# Patient Record
Sex: MALE | Race: ASIAN | NOT HISPANIC OR LATINO | Employment: FULL TIME | URBAN - METROPOLITAN AREA
[De-identification: names, ages, dates, MRNs, and addresses within clinical notes are randomized per-mention and may not be internally consistent; named-entity substitution may affect disease eponyms.]

---

## 2017-05-10 ENCOUNTER — HOSPITAL ENCOUNTER (OUTPATIENT)
Dept: RADIOLOGY | Facility: MEDICAL CENTER | Age: 42
End: 2017-05-10
Attending: FAMILY MEDICINE
Payer: COMMERCIAL

## 2017-05-10 ENCOUNTER — OFFICE VISIT (OUTPATIENT)
Dept: MEDICAL GROUP | Facility: MEDICAL CENTER | Age: 42
End: 2017-05-10
Payer: COMMERCIAL

## 2017-05-10 VITALS
WEIGHT: 143 LBS | SYSTOLIC BLOOD PRESSURE: 110 MMHG | TEMPERATURE: 97.4 F | HEIGHT: 66 IN | BODY MASS INDEX: 22.98 KG/M2 | DIASTOLIC BLOOD PRESSURE: 72 MMHG | HEART RATE: 99 BPM | OXYGEN SATURATION: 96 %

## 2017-05-10 DIAGNOSIS — S69.91XA INJURY OF FINGER OF RIGHT HAND, INITIAL ENCOUNTER: ICD-10-CM

## 2017-05-10 PROCEDURE — 73140 X-RAY EXAM OF FINGER(S): CPT | Mod: RT

## 2017-05-10 PROCEDURE — 99214 OFFICE O/P EST MOD 30 MIN: CPT | Performed by: FAMILY MEDICINE

## 2017-05-10 ASSESSMENT — PATIENT HEALTH QUESTIONNAIRE - PHQ9: CLINICAL INTERPRETATION OF PHQ2 SCORE: 0

## 2017-05-10 NOTE — MR AVS SNAPSHOT
"        Philippe Schaeferuyen   5/10/2017 4:40 PM   Office Visit   MRN: [id number]    Department:  [facility]   Dept Phone:  [phone]    Description:  Male : 1975   Provider:  Neha Rivera M.D.           Reason for Visit     Finger Injury middle finger on the right side      Allergies as of 5/10/2017     No Known Allergies      You were diagnosed with     Injury of finger of right hand, initial encounter   [[id number]]         Vital Signs     Blood Pressure Pulse Temperature Height Weight Body Mass Index    110/72 mmHg 99 36.3 °C (97.4 °F) 1.676 m (5' 5.98\") 64.864 kg (143 lb) 23.09 kg/m2    Oxygen Saturation Smoking Status                96% Never Smoker           Basic Information     Date Of Birth Sex Race Ethnicity Preferred Language    1975 Male  Non- English      Problem List              ICD-10-CM Priority Class Noted - Resolved    Well adult health check Z00.00   2016 - Present    Allergic rhinitis due to pollen J30.1   2016 - Present    Visual changes H53.9   2016 - Present    Dry skin L85.3   2016 - Present    High serum low-density lipoprotein (LDL) R79.89   2016 - Present    Hyperglycemia R73.9   2016 - Present    Leukopenia D72.819   2016 - Present    Injury of finger of right hand S69.91XA   5/10/2017 - Present      Health Maintenance        Date Due Completion Dates    IMM DTaP/Tdap/Td Vaccine (2 - Td) 2024            Current Immunizations     Influenza Vaccine Quad Inj (Pf) 2016  3:26 PM    Influenza Vaccine Quad Inj (Preserved) 2016    MMR Vaccine 1996, 3/19/1996    Tdap Vaccine 2014      Below and/or attached are the medications your provider expects you to take. Review all of your home medications and newly ordered medications with your provider and/or pharmacist. Follow medication instructions as directed by your provider and/or pharmacist. Please keep your medication list with you and share with " your provider. Update the information when medications are discontinued, doses are changed, or new medications (including over-the-counter products) are added; and carry medication information at all times in the event of emergency situations     Allergies:  No Known Allergies          Medications  Valid as of: May 10, 2017 -  5:16 PM    Generic Name Brand Name Tablet Size Instructions for use    .                 Medicines prescribed today were sent to:     [facility][id number] - [place], [place] - [address]    [address] [place] [address]    Phone: [phone] Fax: [phone]    Open 24 Hours?: No      Medication refill instructions:       If your prescription bottle indicates you have medication refills left, it is not necessary to call your provider’s office. Please contact your pharmacy and they will refill your medication.    If your prescription bottle indicates you do not have any refills left, you may request refills at any time through one of the following ways: The online [employer] system (except Urgent Care), by calling your provider’s office, or by asking your pharmacy to contact your provider’s office with a refill request. Medication refills are processed only during regular business hours and may not be available until the next business day. Your provider may request additional information or to have a follow-up visit with you prior to refilling your medication.   *Please Note: Medication refills are assigned a new Rx number when refilled electronically. Your pharmacy may indicate that no refills were authorized even though a new prescription for the same medication is available at the pharmacy. Please request the medicine by name with the pharmacy before contacting your provider for a refill.        Your To Do List     Future Labs/Procedures Complete By Expires    DX-FINGER(S) 2+ RIGHT  As directed 11/10/2017         [employer] Access Code: Activation code not generated  Current Non[employer] Status:  Active

## 2017-05-17 NOTE — ASSESSMENT & PLAN NOTE
Patient was skiing 2 weeks ago and fell, jamming the middle finger of his right hand.  He was using a pole at the time.  He had initial pain and that has improved but the finger has remained swollen and difficult to bend.

## 2017-05-17 NOTE — PROGRESS NOTES
"Subjective:     Chief Complaint   Patient presents with   • Finger Injury     middle finger on the right side       Philippe Hillman is a 41 y.o. male here today for evaluation and management of:    Injury of finger of right hand  Patient was skiing 2 weeks ago and fell, jamming the middle finger of his right hand.  He was using a pole at the time.  He had initial pain and that has improved but the finger has remained swollen and difficult to bend.         No Known Allergies    Current medicines (including changes today)  No current outpatient prescriptions on file.     No current facility-administered medications for this visit.       He  has a past medical history of Measles (1983).    Patient Active Problem List    Diagnosis Date Noted   • Injury of finger of right hand 05/10/2017   • Hyperglycemia 09/19/2016   • Leukopenia 09/19/2016   • Well adult health check 09/12/2016   • Allergic rhinitis due to pollen 09/12/2016   • Visual changes 09/12/2016   • Dry skin 09/12/2016   • High serum low-density lipoprotein (LDL) 09/12/2016       ROS   No fever or chills.  No nausea or vomiting.  No chest pain or palpitations.  No cough or SOB.  No pain with urination or hematuria.  No black or bloody stools.       Objective:     Blood pressure 110/72, pulse 99, temperature 36.3 °C (97.4 °F), height 1.676 m (5' 5.98\"), weight 64.864 kg (143 lb), SpO2 96 %. Body mass index is 23.09 kg/(m^2).   Physical Exam:  Well developed, well nourished.  Alert, oriented in no acute distress.  Eye contact is good, speech goal directed, affect calm  Extremity: Swelling of the PIP joint of the third right finger. Tender to palpation. No crepitus. Decreased extension and flexion. Neurovascular intact distally.      Assessment and Plan:   The following treatment plan was discussed    1. Injury of finger of right hand, initial encounter  Rule out fracture. Await x-ray results. Discussed that this may be a strain. Consider splinting. Await results.  - " DX-FINGER(S) 2+ RIGHT; Future    Any change or worsening of signs or symptoms, patient encouraged to follow-up or report to the emergency room for further evaluation. Patient understands and agrees.    Followup: Return if symptoms worsen or fail to improve.

## 2017-07-05 ENCOUNTER — TELEPHONE (OUTPATIENT)
Dept: MEDICAL GROUP | Facility: MEDICAL CENTER | Age: 42
End: 2017-07-05

## 2017-07-05 DIAGNOSIS — S69.91XA INJURY OF FINGER OF RIGHT HAND, INITIAL ENCOUNTER: ICD-10-CM

## 2017-07-05 NOTE — TELEPHONE ENCOUNTER
1. Caller Name: Philippe Hillman                                         Call Back Number: [phone]      Patient approves a detailed voicemail message: yes    Pt is calling to request a referral for his finger. Pt states he has already discussed this in office and has decided to finally go as it is not healing.

## 2017-08-03 ENCOUNTER — OFFICE VISIT (OUTPATIENT)
Dept: MEDICAL GROUP | Facility: MEDICAL CENTER | Age: 42
End: 2017-08-03
Payer: COMMERCIAL

## 2017-08-03 VITALS
OXYGEN SATURATION: 96 % | TEMPERATURE: 98.2 F | HEART RATE: 80 BPM | BODY MASS INDEX: 22.5 KG/M2 | DIASTOLIC BLOOD PRESSURE: 80 MMHG | SYSTOLIC BLOOD PRESSURE: 108 MMHG | HEIGHT: 66 IN | WEIGHT: 140 LBS

## 2017-08-03 DIAGNOSIS — M20.011 MALLET DEFORMITY OF RIGHT MIDDLE FINGER: ICD-10-CM

## 2017-08-03 PROCEDURE — 99213 OFFICE O/P EST LOW 20 MIN: CPT | Performed by: FAMILY MEDICINE

## 2017-08-03 NOTE — PROGRESS NOTES
"Subjective:     Chief Complaint   Patient presents with   • Finger Pain     right hand middle finger   • Referral Needed     Physical Therapy       Philippe Hillman is a 41 y.o. male here today for evaluation and management of:    Mallet deformity of right middle finger  Patient was skiing in mid April when he fell, jamming the middle finger of his right hand.  He was using a pole at the time.  He had initial pain and that has improved but the finger has remained swollen and difficult to bend. We did an x-ray in May that showed no fractures and a referral was placed to the hand surgeon. He's been unable to get in for an appointment and had one canceled because of an emergency surgery. He continues to have difficulty with bending the finger and cannot fully extend it. 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 It is painful to touch. He is wondering if he should start physical therapy for it while he is waiting for an appointment.              No Known Allergies    Current medicines (including changes today)  No current outpatient prescriptions on file.     No current facility-administered medications for this visit.       He  has a past medical history of Measles (1983).    Patient Active Problem List    Diagnosis Date Noted   • Mallet deformity of right middle finger 08/03/2017   • Injury of finger of right hand 05/10/2017   • Hyperglycemia 09/19/2016   • Leukopenia 09/19/2016   • Well adult health check 09/12/2016   • Allergic rhinitis due to pollen 09/12/2016   • Visual changes 09/12/2016   • Dry skin 09/12/2016   • High serum low-density lipoprotein (LDL) 09/12/2016       ROS   No fever or chills.  No nausea or vomiting.  No chest pain or palpitations.  No cough or SOB.  No pain with urination or hematuria.  No black or bloody stools.       Objective:     Blood pressure 108/80, pulse 80, temperature 36.8 °C (98.2 °F), height 1.676 m (5' 5.98\"), weight 63.504 kg (140 lb), SpO2 96 %. Body mass " index is 22.61 kg/(m^2).   Physical Exam:  Well developed, well nourished.  Alert, oriented in no acute distress.  Eye contact is good, speech goal directed, affect calm  Eyes: conjunctiva non-injected, sclera non-icteric.  Right middle finger with a mallet deformity present. Patient is unable to extend the PIP joint. He is able to flex the finger but there is some limitation of motion. Area is tender just palpation and obviously swollen. There is no erythema. Neurovascular is intact distally.     Assessment and Plan:   The following treatment plan was discussed    1. Mallet deformity of right middle finger  We will try and get him into the hand surgeon sooner than later. We'll also give him referral to occupational therapy to work on hand therapy. Patient to call if there is another delay.  - REFERRAL TO HAND SURGERY  - REFERRAL TO OCCUPATIONAL THERAPY Reason for Therapy: Eval/Treat/Report    Any change or worsening of signs or symptoms, patient encouraged to follow-up or report to the emergency room for further evaluation. Patient understands and agrees.    Followup: Return if symptoms worsen or fail to improve.

## 2017-08-03 NOTE — ASSESSMENT & PLAN NOTE
Patient was skiing in mid April when he fell, jamming the middle finger of his right hand.  He was using a pole at the time.  He had initial pain and that has improved but the finger has remained swollen and difficult to bend. We did an x-ray in May that showed no fractures and a referral was placed to the hand surgeon. He's been unable to get in for an appointment and had one canceled because of an emergency surgery. He continues to have difficulty with bending the finger and cannot fully extend it. 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 It is painful to touch. He is wondering if he should start physical therapy for it while he is waiting for an appointment.

## 2017-08-03 NOTE — PATIENT INSTRUCTIONS
Mallet Finger  Mallet finger is an injury that occurs from a blow to the tip of your straightened finger or thumb. It is also known as baseball finger. The blow to your fingertip causes it to bend farther than normal, which tears the cord that attaches to the tip of your finger (extensor tendon). Your extensor tendon is what straightens the end of your finger. If this tendon is damaged, you will not be able to straighten your fingertip. Sometimes, a piece of bone may be pulled away with the tendon (avulsion injury), or the tendon may tear completely. In some cases, surgery may be required to repair the damage.  CAUSES  Mallet finger is caused by a hard, direct hit to the tip of your finger or thumb. This injury often happens from getting hit in the finger with a hard ball, such as a baseball.  RISK FACTORS  This injury is more likely to happen if you play sports that use a hard ball.  SYMPTOMS   The main symptom of this injury is not being able to straighten the tip of your finger. You can manually straighten your fingertip with your other hand, but the finger cannot straighten on its own. Other symptoms may include:  · Pain.  · Swelling.  · Bruising.  · Blood under the fingernail.  DIAGNOSIS   Your health care provider may suspect mallet finger if you are not able to extend your fingertip, especially if you recently injured your hand. Your health care provider will do a physical exam. This may include X-rays to see if a piece of bone has been pulled away or if the finger joint has  (dislocated).  TREATMENT   Mallet finger may be treated with:  · Wearing a splint on your fingertip to keep it straight (extended) while the tendon heals.  · Surgery to repair the tendon, in severe cases. This may involve:  ¨ The use of a pin or screw to keep your finger extended and your tendon attached.  ¨ Taking a piece of tendon from another part of your body (graft) to replace a torn tendon.  HOME CARE INSTRUCTIONS   · Take  medicines only as directed by your health care provider.  · Wear the splint as directed by your health care provider. Remove it only as directed by your health care provider.  · If you take your splint off to dry it or change it, gently press your finger on a flat surface to keep it straight.  · If directed, apply ice to the injured area:    ¨ Put ice in a plastic bag.    ¨ Place a towel between your skin and the bag.    ¨ Leave the ice on for 20 minutes, 2-3 times a day.  · Raise the injured area above the level of your heart while you are sitting or lying down.  SEEK MEDICAL CARE IF:   · You have pain or swelling that is getting worse.    · Your finger feels cold.    · You cannot extend your finger after treatment.  SEEK IMMEDIATE MEDICAL CARE IF:   · Even after loosening your splint, your finger is:  ¨ Very red and swollen.  ¨ White or blue.  ¨ Numb or tingling.     This information is not intended to replace advice given to you by your health care provider. Make sure you discuss any questions you have with your health care provider.     Document Released: 12/15/2001 Document Revised: 05/03/2016 Document Reviewed: 10/21/2015  [employer] Interactive Patient Education ©2016 [employer]

## 2017-08-11 ENCOUNTER — HOSPITAL ENCOUNTER (OUTPATIENT)
Dept: LAB | Facility: MEDICAL CENTER | Age: 42
End: 2017-08-11
Payer: COMMERCIAL

## 2017-08-11 LAB
BP DIAS: 78 MMHG
BP SYS: 111 MMHG
CHOLEST SERPL-MCNC: 178 MG/DL (ref 100–199)
DIABETES HTDIA: NO
EVENT NAME HTEVT: NORMAL
FASTING HTFAS: YES
GLUCOSE SERPL-MCNC: 91 MG/DL (ref 65–99)
HDLC SERPL-MCNC: 43 MG/DL
HYPERTENSION HTHYP: NO
LDLC SERPL CALC-MCNC: 108 MG/DL
SCREENING LOC CITY HTCIT: NORMAL
SCREENING LOC STATE HTSTA: NORMAL
SCREENING LOCATION HTLOC: NORMAL
SMOKING HTSMO: NO
SUBSCRIBER ID HTSID: NORMAL
TRIGL SERPL-MCNC: 134 MG/DL (ref 0–149)

## 2017-08-11 PROCEDURE — 36415 COLL VENOUS BLD VENIPUNCTURE: CPT

## 2017-08-11 PROCEDURE — 82947 ASSAY GLUCOSE BLOOD QUANT: CPT

## 2017-08-11 PROCEDURE — S5190 WELLNESS ASSESSMENT BY NONPH: HCPCS

## 2017-08-11 PROCEDURE — 80061 LIPID PANEL: CPT

## 2017-10-10 ENCOUNTER — IMMUNIZATION (OUTPATIENT)
Dept: OCCUPATIONAL MEDICINE | Facility: CLINIC | Age: 42
End: 2017-10-10

## 2017-10-10 DIAGNOSIS — Z23 NEED FOR VACCINATION: ICD-10-CM

## 2017-10-10 PROCEDURE — 90686 IIV4 VACC NO PRSV 0.5 ML IM: CPT | Performed by: PREVENTIVE MEDICINE

## 2017-12-18 ENCOUNTER — OFFICE VISIT (OUTPATIENT)
Dept: MEDICAL GROUP | Facility: MEDICAL CENTER | Age: 42
End: 2017-12-18
Payer: COMMERCIAL

## 2017-12-18 VITALS
WEIGHT: 144 LBS | SYSTOLIC BLOOD PRESSURE: 110 MMHG | DIASTOLIC BLOOD PRESSURE: 80 MMHG | HEART RATE: 110 BPM | OXYGEN SATURATION: 96 % | TEMPERATURE: 97.8 F | HEIGHT: 66 IN | BODY MASS INDEX: 23.14 KG/M2

## 2017-12-18 DIAGNOSIS — L30.8 OTHER ECZEMA: ICD-10-CM

## 2017-12-18 DIAGNOSIS — G89.29 CHRONIC RIGHT SHOULDER PAIN: ICD-10-CM

## 2017-12-18 DIAGNOSIS — J06.9 VIRAL URI WITH COUGH: ICD-10-CM

## 2017-12-18 DIAGNOSIS — M79.621 PAIN IN RIGHT UPPER ARM: ICD-10-CM

## 2017-12-18 DIAGNOSIS — M25.511 CHRONIC RIGHT SHOULDER PAIN: ICD-10-CM

## 2017-12-18 PROCEDURE — 99214 OFFICE O/P EST MOD 30 MIN: CPT | Performed by: NURSE PRACTITIONER

## 2017-12-18 RX ORDER — CLOBETASOL PROPIONATE 0.5 MG/G
1 CREAM TOPICAL 2 TIMES DAILY
Qty: 1 TUBE | Refills: 0 | Status: SHIPPED | OUTPATIENT
Start: 2017-12-18 | End: 2019-02-06

## 2017-12-18 NOTE — PROGRESS NOTES
Subjective:     Philippe Hillman is a 42 y.o. male who presents with   Chief Complaint   Patient presents with   • Arm Pain   .    HPI:   Seen in f/u for URI.  Sx started 2-3 weeks ago.  He used mucinex d.  hei s getting better.  He is feeling better.  Still having some fatigue.  am nasal dg is white.  Was coughing but resolved with mucinex D.  No sweating.  Mild fever.  Had chills with that but they are resolved.  No n/v,d.  No SOB or wheezing.    He is having upper arm pain.  pain with ROM.  Sx started 1 month ago.  No hx of injury.  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  He will have pain with reaching to the side.  No numbness.    He has a red peeling rash on rt wrist. + itching.  Been there long tern. Gets better with lotion on it.      Patient Active Problem List    Diagnosis Date Noted   • Mallet deformity of right middle finger 08/03/2017   • Injury of finger of right hand 05/10/2017   • Hyperglycemia 09/19/2016   • Leukopenia 09/19/2016   • Well adult health check 09/12/2016   • Allergic rhinitis due to pollen 09/12/2016   • Visual changes 09/12/2016   • Dry skin 09/12/2016   • High serum low-density lipoprotein (LDL) 09/12/2016       Current medicines (including changes today)  Current Outpatient Prescriptions   Medication Sig Dispense Refill   • Diclofenac Sodium 1 % Gel Apply 1 Application to skin as directed 2 Times a Day. 1 Tube 1   • clobetasol (TEMOVATE) 0.05 % Cream Apply 1 Application to affected area(s) 2 times a day. Apply to rt wrist rash bid 1 Tube 0     No current facility-administered medications for this visit.        No Known Allergies    ROS  Constitutional: Negative. Negative for fever, chills, weight loss, malaise/fatigue and diaphoresis.   HENT: Negative. Negative for hearing loss, ear pain, nosebleeds, congestion, sore throat, neck pain, tinnitus and ear discharge.   Respiratory: Negative. Negative for cough, hemoptysis, sputum production, shortness of breath,  "wheezing and stridor.   Cardiovascular: Negative. Negative for chest pain, palpitations, orthopnea, claudication, leg swelling and PND.   Gastrointestinal: Denies nausea, vomiting, diarrhea, constipation, heartburn, melena or hematochezia.  Genitourinary: Denies dysuria, hematuria, urinary incontinence, frequency or urgency.        Objective:     Blood pressure 110/80, pulse (!) 110, temperature 36.6 °C (97.8 °F), height 1.676 m (5' 6\"), weight 65.3 kg (144 lb), SpO2 96 %. Body mass index is 23.24 kg/m².    Physical Exam:  Vitals reviewed.  Constitutional: Oriented to person, place, and time. appears well-developed and well-nourished. No distress.   Cardiovascular: Normal rate, regular rhythm, normal heart sounds and intact distal pulses. Exam reveals no gallop and no friction rub. No murmur heard. No carotid bruits.   Pulmonary/Chest: Effort normal and breath sounds normal. No stridor. No respiratory distress. no wheezes or rales. exhibits no tenderness.   Musculoskeletal: Normal range of motion. exhibits no edema. alisha pedal pulses 2+.  Lymphadenopathy: No cervical or supraclavicular adenopathy.   Neurological: Alert and oriented to person, place, and time. exhibits normal muscle tone.  Skin: Skin is warm and dry. No diaphoresis.   Psychiatric: Normal mood and affect. Behavior is normal.      Assessment and Plan:     The following treatment plan was discussed:    1. Viral URI with cough      resolving   2. Pain in right upper arm  Diclofenac Sodium 1 % Gel    DX-SHOULDER 2+ RIGHT    DX-HUMERUS 2+ RIGHT    xray rt humerus and shoulder.  try voltaren gel.  f/u with pt with test results and if sx not improved with tx   3. Chronic right shoulder pain  Diclofenac Sodium 1 % Gel    DX-SHOULDER 2+ RIGHT    DX-HUMERUS 2+ RIGHT   4. Other eczema  clobetasol (TEMOVATE) 0.05 % Cream    try clobetasol cream         Followup: Return if symptoms worsen or fail to improve.  "

## 2018-04-20 ENCOUNTER — OFFICE VISIT (OUTPATIENT)
Dept: MEDICAL GROUP | Facility: MEDICAL CENTER | Age: 43
End: 2018-04-20
Payer: COMMERCIAL

## 2018-04-20 VITALS
DIASTOLIC BLOOD PRESSURE: 60 MMHG | HEIGHT: 66 IN | RESPIRATION RATE: 16 BRPM | OXYGEN SATURATION: 95 % | TEMPERATURE: 98.1 F | HEART RATE: 118 BPM | WEIGHT: 145 LBS | BODY MASS INDEX: 23.3 KG/M2 | SYSTOLIC BLOOD PRESSURE: 98 MMHG

## 2018-04-20 DIAGNOSIS — M10.09 ACUTE IDIOPATHIC GOUT OF MULTIPLE SITES: ICD-10-CM

## 2018-04-20 PROCEDURE — 99214 OFFICE O/P EST MOD 30 MIN: CPT | Performed by: FAMILY MEDICINE

## 2018-04-20 RX ORDER — ALLOPURINOL 100 MG/1
100 TABLET ORAL DAILY
Qty: 90 TAB | Refills: 3 | Status: SHIPPED | OUTPATIENT
Start: 2018-04-20 | End: 2019-02-06 | Stop reason: SDUPTHER

## 2018-04-20 ASSESSMENT — PATIENT HEALTH QUESTIONNAIRE - PHQ9: CLINICAL INTERPRETATION OF PHQ2 SCORE: 0

## 2018-04-20 NOTE — ASSESSMENT & PLAN NOTE
Never started allopurinol that he was originally diagnosed.  He is having left knee and left heel. Now has in left index finger.  Uric acid was 8.3 at labs when he had life insurance labs done. He did get some redness and swelling. He denies any hematuria, fever, chills or flank pain.

## 2018-04-20 NOTE — PATIENT INSTRUCTIONS
Allopurinol tablets  What is this medicine?  ALLOPURINOL (al oh PURE i nole) reduces the amount of uric acid the body makes. It is used to treat the symptoms of gout. It is also used to treat or prevent high uric acid levels that occur as a result of certain types of chemotherapy. This medicine may also help patients who frequently have kidney stones.  This medicine may be used for other purposes; ask your health care provider or pharmacist if you have questions.  COMMON BRAND NAME(S): Zyloprim  What should I tell my health care provider before I take this medicine?  They need to know if you have any of these conditions:  -kidney or liver disease  -an unusual or allergic reaction to allopurinol, other medicines, foods, dyes, or preservatives  -pregnant or trying to get pregnant  -breast feeding  How should I use this medicine?  Take this medicine by mouth with a glass of water. Follow the directions on the prescription label. If this medicine upsets your stomach, take it with food or milk. Take your doses at regular intervals. Do not take your medicine more often than directed.  Talk to your pediatrician regarding the use of this medicine in children. Special care may be needed. While this drug may be prescribed for children as young as 6 years for selected conditions, precautions do apply.  Overdosage: If you think you have taken too much of this medicine contact a poison control center or emergency room at once.  NOTE: This medicine is only for you. Do not share this medicine with others.  What if I miss a dose?  If you miss a dose, take it as soon as you can. If it is almost time for your next dose, take only that dose. Do not take double or extra doses.  What may interact with this medicine?  Do not take this medicine with the following medication:  -didanosine, ddI  This medicine may also interact with the following medications:  -amoxicillin or ampicillin  -azathioprine  -certain medicines used to treat  gout  -certain types of diuretics  -chlorpropamide  -cyclosporine  -dicumarol  -mercaptopurine  -tolbutamide  -warfarin  This list may not describe all possible interactions. Give your health care provider a list of all the medicines, herbs, non-prescription drugs, or dietary supplements you use. Also tell them if you smoke, drink alcohol, or use illegal drugs. Some items may interact with your medicine.  What should I watch for while using this medicine?  Visit your doctor or health care professional for regular checks on your progress. If you are taking this medicine to treat gout, you may not have less frequent attacks at first. Keep taking your medicine regularly and the attacks should get better within 2 to 6 weeks. Drink plenty of water (10 to 12 full glasses a day) while you are taking this medicine. This will help to reduce stomach upset and reduce the risk of getting gout or kidney stones.  Call your doctor or health care professional at once if you get a skin rash together with chills, fever, sore throat, or nausea and vomiting, if you have blood in your urine, or difficulty passing urine.  Do not take vitamin C without asking your doctor or health care professional. Too much vitamin C can increase the chance of getting kidney stones.  You may get drowsy or dizzy. Do not drive, use machinery, or do anything that needs mental alertness until you know how this drug affects you. Do not stand or sit up quickly, especially if you are an older patient. This reduces the risk of dizzy or fainting spells. Alcohol can make you more drowsy and dizzy. Alcohol can also increase the chance of stomach problems and increase the amount of uric acid in your blood. Avoid alcoholic drinks.  What side effects may I notice from receiving this medicine?  Side effects that you should report to your doctor or health care professional as soon as possible:  -allergic reactions like skin rash, itching or hives, swelling of the face,  lips, or tongue  -breathing problems  -muscle aches or pains  -redness, blistering, peeling or loosening of the skin, including inside the mouth  Side effects that usually do not require medical attention (report to your doctor or health care professional if they continue or are bothersome):  -changes in taste  -diarrhea  -indigestion  -stomach pain or cramps  This list may not describe all possible side effects. Call your doctor for medical advice about side effects. You may report side effects to FDA at 2-103-ETK-7398.  Where should I keep my medicine?  Keep out of the reach of children.  Store at room temperature between 15 and 25 degrees C (59 and 77 degrees F). Protect from light and moisture. Throw away any unused medicine after the expiration date.  NOTE: This sheet is a summary. It may not cover all possible information. If you have questions about this medicine, talk to your doctor, pharmacist, or health care provider.  © 2018 [employer]vier/[employer] Standard (2009-06-22 14:26:54)  Low-Purine Diet  Purines are compounds that affect the level of uric acid in your body. A low-purine diet is a diet that is low in purines. Eating a low-purine diet can prevent the level of uric acid in your body from getting too high and causing gout or kidney stones or both.  What do I need to know about this diet?  · Choose low-purine foods. Examples of low-purine foods are listed in the next section.  · Drink plenty of fluids, especially water. Fluids can help remove uric acid from your body. Try to drink 8-16 cups (1.9-3.8 L) a day.  · Limit foods high in fat, especially saturated fat, as fat makes it harder for the body to get rid of uric acid. Foods high in saturated fat include pizza, cheese, ice cream, whole milk, fried foods, and gravies. Choose foods that are lower in fat and lean sources of protein. Use olive oil when cooking as it contains healthy fats that are not high in saturated fat.  · Limit alcohol. Alcohol interferes  with the elimination of uric acid from your body. If you are having a gout attack, avoid all alcohol.  · Keep in mind that different people’s bodies react differently to different foods. You will probably learn over time which foods do or do not affect you. If you discover that a food tends to cause your gout to flare up, avoid eating that food. You can more freely enjoy foods that do not cause problems. If you have any questions about a food item, talk to your dietitian or health care provider.  Which foods are low, moderate, and high in purines?  The following is a list of foods that are low, moderate, and high in purines. You can eat any amount of the foods that are low in purines. You may be able to have small amounts of foods that are moderate in purines. Ask your health care provider how much of a food moderate in purines you can have. Avoid foods high in purines.  Grains  · Foods low in purines: Enriched white bread, pasta, rice, cake, cornbread, popcorn.  · Foods moderate in purines: Whole-grain breads and cereals, wheat germ, bran, oatmeal. Uncooked oatmeal. Dry wheat bran or wheat germ.  · Foods high in purines: Pancakes, [place] toast, biscuits, muffins.  Vegetables  · Foods low in purines: All vegetables, except those that are moderate in purines.  · Foods moderate in purines: Asparagus, cauliflower, spinach, mushrooms, green peas.  Fruits  · All fruits are low in purines.  Meats and other Protein Foods  · Foods low in purines: Eggs, nuts, peanut butter.  · Foods moderate in purines: 80-90% lean beef, lamb, veal, pork, poultry, fish, eggs, peanut butter, nuts. Crab, lobster, oysters, and shrimp. Cooked dried beans, peas, and lentils.  · Foods high in purines: Anchovies, sardines, herring, mussels, tuna, codfish, scallops, trout, and raffy. Bruner. Organ meats (such as liver or kidney). Tripe. Game meat. Goose. Sweetbreads.  Dairy  · All dairy foods are low in purines. Low-fat and fat-free dairy products  are best because they are low in saturated fat.  Beverages  · Drinks low in purines: Water, carbonated beverages, tea, coffee, cocoa.  · Drinks moderate in purines: Soft drinks and other drinks sweetened with high-fructose corn syrup. Juices. To find whether a food or drink is sweetened with high-fructose corn syrup, look at the ingredients list.  · Drinks high in purines: Alcoholic beverages (such as beer).  Condiments  · Foods low in purines: Salt, herbs, olives, pickles, relishes, vinegar.  · Foods moderate in purines: Butter, margarine, oils, mayonnaise.  Fats and Oils  · Foods low in purines: All types, except gravies and sauces made with meat.  · Foods high in purines: Gravies and sauces made with meat.  Other Foods  · Foods low in purines: Sugars, sweets, gelatin. Cake. Soups made without meat.  · Foods moderate in purines: Meat-based or fish-based soups, broths, or bouillons. Foods and drinks sweetened with high-fructose corn syrup.  · Foods high in purines: High-fat desserts (such as ice cream, cookies, cakes, pies, doughnuts, and chocolate).  Contact your dietitian for more information on foods that are not listed here.   This information is not intended to replace advice given to you by your health care provider. Make sure you discuss any questions you have with your health care provider.  Document Released: 04/13/2012 Document Revised: 05/25/2017 Document Reviewed: 11/24/2014  [employer] Interactive Patient Education © 2017 [employer] Inc.

## 2018-04-26 NOTE — PROGRESS NOTES
"Subjective:     Chief Complaint   Patient presents with   • Gout     pt experiencing gout flare up knee and foot       Philippe Hillman is a 42 y.o. male here today for evaluation and management of:    Acute idiopathic gout of multiple sites  Never started allopurinol that he was originally diagnosed.  He is having left knee and left heel. Now has in left index finger.  Uric acid was 8.3 at labs when he had life insurance labs done. He did get some redness and swelling. He denies any hematuria, fever, chills or flank pain.       No Known Allergies    Current medicines (including changes today)  Current Outpatient Prescriptions   Medication Sig Dispense Refill   • allopurinol (ZYLOPRIM) 100 MG Tab Take 1 Tab by mouth every day. 90 Tab 3   • Diclofenac Sodium 1 % Gel Apply 1 Application to skin as directed 2 Times a Day. (Patient not taking: Reported on 4/20/2018) 1 Tube 1   • clobetasol (TEMOVATE) 0.05 % Cream Apply 1 Application to affected area(s) 2 times a day. Apply to rt wrist rash bid (Patient not taking: Reported on 4/20/2018) 1 Tube 0     No current facility-administered medications for this visit.        He  has a past medical history of Measles (1983).    Patient Active Problem List    Diagnosis Date Noted   • Acute idiopathic gout of multiple sites 04/20/2018   • Mallet deformity of right middle finger 08/03/2017   • Injury of finger of right hand 05/10/2017   • Hyperglycemia 09/19/2016   • Leukopenia 09/19/2016   • Well adult health check 09/12/2016   • Allergic rhinitis due to pollen 09/12/2016   • Visual changes 09/12/2016   • Dry skin 09/12/2016   • High serum low-density lipoprotein (LDL) 09/12/2016       ROS   No fever or chills.  No nausea or vomiting.  No chest pain or palpitations.  No cough or SOB.  No pain with urination or hematuria.  No black or bloody stools.       Objective:     Blood pressure (!) 98/60, pulse (!) 118, temperature 36.7 °C (98.1 °F), resp. rate 16, height 1.676 m (5' 6\"), weight " 65.8 kg (145 lb), SpO2 95 %. Body mass index is 23.4 kg/m².   Physical Exam:  Well developed, well nourished.  Alert, oriented in no acute distress.  Eye contact is good, speech goal directed, affect calm  Eyes: conjunctiva non-injected, sclera non-icteric.  Neck Supple.  No adenopathy or masses in the neck or supraclavicular regions. No thyromegaly  Lungs: clear to auscultation bilaterally with good excursion. No wheezes or rhonchi  CV: regular rate and rhythm. No murmur  Knees: No erythema/edema/ecchymosis/effusion. Tenderness to palpation of lateral patella. Joint line tenderness positive. Patellar grind test positive. Lachman's negative. Bhargavi's negative. ROM intact. 5/5 strength bilaterally. 2+ deep tendon reflexes bilaterally.        Assessment and Plan:   The following treatment plan was discussed    1. Acute idiopathic gout of multiple sites  Started allopurinol. Check labs. NSAIDs for relief. Ice to area. Call if not improving  - allopurinol (ZYLOPRIM) 100 MG Tab; Take 1 Tab by mouth every day.  Dispense: 90 Tab; Refill: 3  - BASIC METABOLIC PANEL; Future  - CBC WITH DIFFERENTIAL; Future  - URIC ACID; Future    Any change or worsening of signs or symptoms, patient encouraged to follow-up or report to the emergency room for further evaluation. Patient understands and agrees.    Followup: Return in about 3 months (around 7/20/2018).

## 2018-08-09 ENCOUNTER — OFFICE VISIT (OUTPATIENT)
Dept: MEDICAL GROUP | Facility: MEDICAL CENTER | Age: 43
End: 2018-08-09
Payer: COMMERCIAL

## 2018-08-09 VITALS
OXYGEN SATURATION: 97 % | DIASTOLIC BLOOD PRESSURE: 80 MMHG | HEART RATE: 98 BPM | RESPIRATION RATE: 16 BRPM | SYSTOLIC BLOOD PRESSURE: 126 MMHG | BODY MASS INDEX: 23.78 KG/M2 | TEMPERATURE: 97.4 F | HEIGHT: 66 IN | WEIGHT: 148 LBS

## 2018-08-09 DIAGNOSIS — M1A.09X0 IDIOPATHIC CHRONIC GOUT OF MULTIPLE SITES WITHOUT TOPHUS: ICD-10-CM

## 2018-08-09 DIAGNOSIS — H04.129 DRY EYE: ICD-10-CM

## 2018-08-09 DIAGNOSIS — H53.9 VISUAL CHANGES: ICD-10-CM

## 2018-08-09 PROCEDURE — 99214 OFFICE O/P EST MOD 30 MIN: CPT | Performed by: FAMILY MEDICINE

## 2018-08-09 NOTE — PATIENT INSTRUCTIONS
Low-Purine Diet  Purines are compounds that affect the level of uric acid in your body. A low-purine diet is a diet that is low in purines. Eating a low-purine diet can prevent the level of uric acid in your body from getting too high and causing gout or kidney stones or both.  What do I need to know about this diet?  · Choose low-purine foods. Examples of low-purine foods are listed in the next section.  · Drink plenty of fluids, especially water. Fluids can help remove uric acid from your body. Try to drink 8-16 cups (1.9-3.8 L) a day.  · Limit foods high in fat, especially saturated fat, as fat makes it harder for the body to get rid of uric acid. Foods high in saturated fat include pizza, cheese, ice cream, whole milk, fried foods, and gravies. Choose foods that are lower in fat and lean sources of protein. Use olive oil when cooking as it contains healthy fats that are not high in saturated fat.  · Limit alcohol. Alcohol interferes with the elimination of uric acid from your body. If you are having a gout attack, avoid all alcohol.  · Keep in mind that different people’s bodies react differently to different foods. You will probably learn over time which foods do or do not affect you. If you discover that a food tends to cause your gout to flare up, avoid eating that food. You can more freely enjoy foods that do not cause problems. If you have any questions about a food item, talk to your dietitian or health care provider.  Which foods are low, moderate, and high in purines?  The following is a list of foods that are low, moderate, and high in purines. You can eat any amount of the foods that are low in purines. You may be able to have small amounts of foods that are moderate in purines. Ask your health care provider how much of a food moderate in purines you can have. Avoid foods high in purines.  Grains  · Foods low in purines: Enriched white bread, pasta, rice, cake, cornbread, popcorn.  · Foods moderate in  purines: Whole-grain breads and cereals, wheat germ, bran, oatmeal. Uncooked oatmeal. Dry wheat bran or wheat germ.  · Foods high in purines: Pancakes, [place] toast, biscuits, muffins.  Vegetables  · Foods low in purines: All vegetables, except those that are moderate in purines.  · Foods moderate in purines: Asparagus, cauliflower, spinach, mushrooms, green peas.  Fruits  · All fruits are low in purines.  Meats and other Protein Foods  · Foods low in purines: Eggs, nuts, peanut butter.  · Foods moderate in purines: 80-90% lean beef, lamb, veal, pork, poultry, fish, eggs, peanut butter, nuts. Crab, lobster, oysters, and shrimp. Cooked dried beans, peas, and lentils.  · Foods high in purines: Anchovies, sardines, herring, mussels, tuna, codfish, scallops, trout, and raffy. Bruner. Organ meats (such as liver or kidney). Tripe. Game meat. Goose. Sweetbreads.  Dairy  · All dairy foods are low in purines. Low-fat and fat-free dairy products are best because they are low in saturated fat.  Beverages  · Drinks low in purines: Water, carbonated beverages, tea, coffee, cocoa.  · Drinks moderate in purines: Soft drinks and other drinks sweetened with high-fructose corn syrup. Juices. To find whether a food or drink is sweetened with high-fructose corn syrup, look at the ingredients list.  · Drinks high in purines: Alcoholic beverages (such as beer).  Condiments  · Foods low in purines: Salt, herbs, olives, pickles, relishes, vinegar.  · Foods moderate in purines: Butter, margarine, oils, mayonnaise.  Fats and Oils  · Foods low in purines: All types, except gravies and sauces made with meat.  · Foods high in purines: Gravies and sauces made with meat.  Other Foods  · Foods low in purines: Sugars, sweets, gelatin. Cake. Soups made without meat.  · Foods moderate in purines: Meat-based or fish-based soups, broths, or bouillons. Foods and drinks sweetened with high-fructose corn syrup.  · Foods high in purines: High-fat desserts  (such as ice cream, cookies, cakes, pies, doughnuts, and chocolate).  Contact your dietitian for more information on foods that are not listed here.   This information is not intended to replace advice given to you by your health care provider. Make sure you discuss any questions you have with your health care provider.  Document Released: 04/13/2012 Document Revised: 05/25/2017 Document Reviewed: 11/24/2014  [employer] Interactive Patient Education © 2017 Else[employer] Inc.

## 2018-08-09 NOTE — ASSESSMENT & PLAN NOTE
When he first wakes up he has difficulty seeing clearly for about 15 minutes.  He last saw the ophthalmologist in December.  He was told he has dry eyes and had a tear duct tube.  Patient doesn't feel like this helped.  He had Lasik 12-15 years ago.  He would like a referral to ophthalmology.

## 2018-08-14 NOTE — ASSESSMENT & PLAN NOTE
Patient has not been taking his allopurinol regularly.  He reports that he has not had any frequent outbreaks of gout.

## 2018-08-14 NOTE — PROGRESS NOTES
Subjective:     Chief Complaint   Patient presents with   • Follow-Up   • Referral Needed       Philippe Hillman is a 42 y.o. male here today for evaluation and management of:    Visual changes  When he first wakes up he has difficulty seeing clearly for about 15 minutes.  He last saw the ophthalmologist in December.  He was told he has dry eyes and had a tear duct tube.  Patient doesn't feel like this helped.  He had Lasik 12-15 years ago.  He would like a referral to ophthalmology.    Chronic idiopathic gout of multiple sites  Patient has not been taking his allopurinol regularly.  He reports that he has not had any frequent outbreaks of gout.       No Known Allergies    Current medicines (including changes today)  Current Outpatient Prescriptions   Medication Sig Dispense Refill   • allopurinol (ZYLOPRIM) 100 MG Tab Take 1 Tab by mouth every day. 90 Tab 3   • Diclofenac Sodium 1 % Gel Apply 1 Application to skin as directed 2 Times a Day. (Patient not taking: Reported on 4/20/2018) 1 Tube 1   • clobetasol (TEMOVATE) 0.05 % Cream Apply 1 Application to affected area(s) 2 times a day. Apply to rt wrist rash bid (Patient not taking: Reported on 4/20/2018) 1 Tube 0     No current facility-administered medications for this visit.        He  has a past medical history of Measles (1983).    Patient Active Problem List    Diagnosis Date Noted   • Dry eye 08/09/2018   • Chronic idiopathic gout of multiple sites 04/20/2018   • Mallet deformity of right middle finger 08/03/2017   • Injury of finger of right hand 05/10/2017   • Hyperglycemia 09/19/2016   • Leukopenia 09/19/2016   • Well adult health check 09/12/2016   • Allergic rhinitis due to pollen 09/12/2016   • Visual changes 09/12/2016   • Dry skin 09/12/2016   • High serum low-density lipoprotein (LDL) 09/12/2016       ROS   No fever or chills.  No nausea or vomiting.  No chest pain or palpitations.  No cough or SOB.  No pain with urination or hematuria.  No black or  "bloody stools.       Objective:     Blood pressure 126/80, pulse 98, temperature 36.3 °C (97.4 °F), resp. rate 16, height 1.676 m (5' 5.98\"), weight 67.1 kg (148 lb), SpO2 97 %. Body mass index is 23.9 kg/m².   Physical Exam:  Well developed, well nourished.  Alert, oriented in no acute distress.  Eye contact is good, speech goal directed, affect calm  Eyes: conjunctiva non-injected, sclera non-icteric.  PERRL.  EOMs intact  Lungs: clear to auscultation bilaterally with good excursion. No wheezes or rhonchi  CV: regular rate and rhythm. No murmur  Ext: no edema, color normal, vascularity normal, temperature normal          Assessment and Plan:   The following treatment plan was discussed    1. Visual changes  Refer to ophthalmology for further evaluation and treatment  - REFERRAL TO OPHTHALMOLOGY    2. Dry eye  Refer to ophthalmology for further evaluation and treatment  - REFERRAL TO OPHTHALMOLOGY    3. Idiopathic chronic gout of multiple sites without tophus  Patient is not having frequent outbreaks.  Discussed increased risk of uric acid kidney stones and gout flares if not taking allopurinol regularly.  Continue to monitor    Any change or worsening of signs or symptoms, patient encouraged to follow-up or report to the emergency room for further evaluation. Patient understands and agrees.    Followup: Return if symptoms worsen or fail to improve.           "

## 2018-09-08 ENCOUNTER — HOSPITAL ENCOUNTER (OUTPATIENT)
Dept: LAB | Facility: MEDICAL CENTER | Age: 43
End: 2018-09-08
Payer: COMMERCIAL

## 2018-09-08 LAB
BDY FAT % MEASURED: 18.5 %
BP DIAS: 77 MMHG
BP SYS: 111 MMHG
CHOLEST SERPL-MCNC: 207 MG/DL (ref 100–199)
DIABETES HTDIA: NO
EVENT NAME HTEVT: NORMAL
FASTING HTFAS: YES
GLUCOSE SERPL-MCNC: 99 MG/DL (ref 65–99)
HDLC SERPL-MCNC: 46 MG/DL
HYPERTENSION HTHYP: NO
LDLC SERPL CALC-MCNC: 139 MG/DL
SCREENING LOC CITY HTCIT: NORMAL
SCREENING LOC STATE HTSTA: NORMAL
SCREENING LOCATION HTLOC: NORMAL
SMOKING HTSMO: NO
SUBSCRIBER ID HTSID: NORMAL
TRIGL SERPL-MCNC: 112 MG/DL (ref 0–149)

## 2018-09-08 PROCEDURE — 36415 COLL VENOUS BLD VENIPUNCTURE: CPT

## 2018-09-08 PROCEDURE — 80061 LIPID PANEL: CPT

## 2018-09-08 PROCEDURE — S5190 WELLNESS ASSESSMENT BY NONPH: HCPCS

## 2018-09-08 PROCEDURE — 82947 ASSAY GLUCOSE BLOOD QUANT: CPT

## 2018-09-24 ENCOUNTER — IMMUNIZATION (OUTPATIENT)
Dept: OCCUPATIONAL MEDICINE | Facility: CLINIC | Age: 43
End: 2018-09-24

## 2018-09-24 DIAGNOSIS — Z23 NEED FOR VACCINATION: ICD-10-CM

## 2018-09-25 ENCOUNTER — OFFICE VISIT (OUTPATIENT)
Dept: URGENT CARE | Facility: CLINIC | Age: 43
End: 2018-09-25
Payer: COMMERCIAL

## 2018-09-25 VITALS
OXYGEN SATURATION: 98 % | BODY MASS INDEX: 23.23 KG/M2 | SYSTOLIC BLOOD PRESSURE: 122 MMHG | HEIGHT: 67 IN | WEIGHT: 148 LBS | HEART RATE: 104 BPM | DIASTOLIC BLOOD PRESSURE: 82 MMHG | TEMPERATURE: 97.9 F | RESPIRATION RATE: 16 BRPM

## 2018-09-25 DIAGNOSIS — M10.9 ACUTE GOUT OF RIGHT FOOT, UNSPECIFIED CAUSE: ICD-10-CM

## 2018-09-25 PROCEDURE — 99214 OFFICE O/P EST MOD 30 MIN: CPT | Performed by: NURSE PRACTITIONER

## 2018-09-25 RX ORDER — INDOMETHACIN 50 MG/1
50 CAPSULE ORAL 3 TIMES DAILY
Qty: 15 CAP | Refills: 0 | Status: SHIPPED | OUTPATIENT
Start: 2018-09-25 | End: 2018-12-26 | Stop reason: SDUPTHER

## 2018-09-25 RX ORDER — METHYLPREDNISOLONE 4 MG/1
4 TABLET ORAL DAILY
Qty: 1 KIT | Refills: 0 | Status: SHIPPED | OUTPATIENT
Start: 2018-09-25 | End: 2019-02-06

## 2018-09-29 NOTE — PROGRESS NOTES
"Subjective:      Philippe Hillman is a 42 y.o. male who presents with Foot Swelling (x3 days, also pain, foot pain. Pt, states the pain has gotten better, but his foot is still swollen, he also has gout, but it has never been this bad.)            This is a new problem. Pt reports he developed redness, swelling and pain to right foot about 3 days ago. He admits to a hx of gout. He does not remember injuring this foot. He denies any recent fever or body aches. He does not have any open wounds to his foot. He states his foot feels similar to the way he normally presents with gout, only he states his foot is more swollen than usual. He has only taken Ibuprofen x 1 for the pain.        Review of Systems   Musculoskeletal:        Right foot pain and swelling   All other systems reviewed and are negative.    Past Medical History:   Diagnosis Date   • Measles 1983    in [place]      Past Surgical History:   Procedure Laterality Date   • EYE SURGERY      LASIX      Social History     Social History   • Marital status: Single     Spouse name: N/A   • Number of children: N/A   • Years of education: N/A     Occupational History   • Not on file.     Social History Main Topics   • Smoking status: Never Smoker   • Smokeless tobacco: Never Used   • Alcohol use Yes      Comment: once a month   • Drug use: No   • Sexual activity: Not Currently     Other Topics Concern   • Not on file     Social History Narrative   • No narrative on file          Objective:     /82 (BP Location: Right arm, Patient Position: Sitting, BP Cuff Size: Adult)   Pulse (!) 104   Temp 36.6 °C (97.9 °F) (Temporal)   Resp 16   Ht 1.702 m (5' 7\")   Wt 67.1 kg (148 lb)   SpO2 98%   BMI 23.18 kg/m²      Physical Exam   Constitutional: He is oriented to person, place, and time. Vital signs are normal. He appears well-developed and well-nourished.   HENT:   Head: Normocephalic and atraumatic.   Eyes: Pupils are equal, round, and reactive to light. EOM are " normal.   Neck: Normal range of motion.   Cardiovascular: Normal rate and regular rhythm.    Pulmonary/Chest: Effort normal.   Musculoskeletal: Normal range of motion.        Right foot: There is tenderness and swelling.        Feet:    Neurological: He is alert and oriented to person, place, and time.   Skin: Skin is warm and dry. Capillary refill takes less than 2 seconds.   Psychiatric: He has a normal mood and affect. His speech is normal and behavior is normal. Thought content normal.   Vitals reviewed.              Assessment/Plan:     1. Acute gout of right foot, unspecified cause  - MethylPREDNISolone (MEDROL DOSEPAK) 4 MG Tablet Therapy Pack; Take 1 Tab by mouth every day.  Dispense: 1 Kit; Refill: 0  - indomethacin (INDOCIN) 50 MG Cap; Take 1 Cap by mouth 3 times a day for 5 days.  Dispense: 15 Cap; Refill: 0    Advised pt this certainly presents to be gout. Of course the possibility of infection cannot be ruled out. If redness, swelling and pain continue to get worse, this is likely infection, and he needs to RTC  He understands and agrees  Tylenol PRN breakthrough pain  Elevate foot to help alleviate pain and swelling  Supportive care, differential diagnoses, and indications for immediate follow-up discussed with patient.    Pathogenesis of diagnosis discussed including typical length and natural progression.      Instructed to return to  or nearest emergency department if symptoms fail to improve, for any change in condition, further concerns, or new concerning symptoms.  Patient states understanding of the plan of care and discharge instructions.

## 2018-09-30 PROCEDURE — 90686 IIV4 VACC NO PRSV 0.5 ML IM: CPT | Performed by: PREVENTIVE MEDICINE

## 2018-12-26 DIAGNOSIS — M10.9 ACUTE GOUT OF RIGHT FOOT, UNSPECIFIED CAUSE: ICD-10-CM

## 2018-12-26 RX ORDER — INDOMETHACIN 50 MG/1
50 CAPSULE ORAL 3 TIMES DAILY
Qty: 15 CAP | Refills: 1 | Status: SHIPPED | OUTPATIENT
Start: 2018-12-26 | End: 2018-12-31

## 2019-02-06 ENCOUNTER — OFFICE VISIT (OUTPATIENT)
Dept: MEDICAL GROUP | Facility: LAB | Age: 44
End: 2019-02-06
Payer: COMMERCIAL

## 2019-02-06 ENCOUNTER — HOSPITAL ENCOUNTER (OUTPATIENT)
Dept: LAB | Facility: MEDICAL CENTER | Age: 44
End: 2019-02-06
Attending: FAMILY MEDICINE
Payer: COMMERCIAL

## 2019-02-06 VITALS
BODY MASS INDEX: 23.18 KG/M2 | HEART RATE: 108 BPM | DIASTOLIC BLOOD PRESSURE: 84 MMHG | SYSTOLIC BLOOD PRESSURE: 136 MMHG | HEIGHT: 67 IN | TEMPERATURE: 97.6 F | OXYGEN SATURATION: 97 % | RESPIRATION RATE: 20 BRPM | WEIGHT: 147.71 LBS

## 2019-02-06 DIAGNOSIS — M79.672 LEFT FOOT PAIN: ICD-10-CM

## 2019-02-06 DIAGNOSIS — M1A.09X0 IDIOPATHIC CHRONIC GOUT OF MULTIPLE SITES WITHOUT TOPHUS: ICD-10-CM

## 2019-02-06 LAB
ANION GAP SERPL CALC-SCNC: 13 MMOL/L (ref 0–11.9)
BASOPHILS # BLD AUTO: 0.6 % (ref 0–1.8)
BASOPHILS # BLD: 0.04 K/UL (ref 0–0.12)
BUN SERPL-MCNC: 15 MG/DL (ref 8–22)
CALCIUM SERPL-MCNC: 9.3 MG/DL (ref 8.5–10.5)
CHLORIDE SERPL-SCNC: 103 MMOL/L (ref 96–112)
CO2 SERPL-SCNC: 27 MMOL/L (ref 20–33)
CREAT SERPL-MCNC: 1.03 MG/DL (ref 0.5–1.4)
EOSINOPHIL # BLD AUTO: 0.02 K/UL (ref 0–0.51)
EOSINOPHIL NFR BLD: 0.3 % (ref 0–6.9)
ERYTHROCYTE [DISTWIDTH] IN BLOOD BY AUTOMATED COUNT: 40.3 FL (ref 35.9–50)
GLUCOSE SERPL-MCNC: 118 MG/DL (ref 65–99)
HCT VFR BLD AUTO: 46.4 % (ref 42–52)
HGB BLD-MCNC: 15.5 G/DL (ref 14–18)
IMM GRANULOCYTES # BLD AUTO: 0.01 K/UL (ref 0–0.11)
IMM GRANULOCYTES NFR BLD AUTO: 0.2 % (ref 0–0.9)
LYMPHOCYTES # BLD AUTO: 1.47 K/UL (ref 1–4.8)
LYMPHOCYTES NFR BLD: 22.3 % (ref 22–41)
MCH RBC QN AUTO: 30.6 PG (ref 27–33)
MCHC RBC AUTO-ENTMCNC: 33.4 G/DL (ref 33.7–35.3)
MCV RBC AUTO: 91.5 FL (ref 81.4–97.8)
MONOCYTES # BLD AUTO: 0.38 K/UL (ref 0–0.85)
MONOCYTES NFR BLD AUTO: 5.8 % (ref 0–13.4)
NEUTROPHILS # BLD AUTO: 4.68 K/UL (ref 1.82–7.42)
NEUTROPHILS NFR BLD: 70.8 % (ref 44–72)
NRBC # BLD AUTO: 0 K/UL
NRBC BLD-RTO: 0 /100 WBC
PLATELET # BLD AUTO: 274 K/UL (ref 164–446)
PMV BLD AUTO: 9.6 FL (ref 9–12.9)
POTASSIUM SERPL-SCNC: 3.7 MMOL/L (ref 3.6–5.5)
RBC # BLD AUTO: 5.07 M/UL (ref 4.7–6.1)
SODIUM SERPL-SCNC: 143 MMOL/L (ref 135–145)
URATE SERPL-MCNC: 6.4 MG/DL (ref 2.5–8.3)
WBC # BLD AUTO: 6.6 K/UL (ref 4.8–10.8)

## 2019-02-06 PROCEDURE — 84550 ASSAY OF BLOOD/URIC ACID: CPT

## 2019-02-06 PROCEDURE — 85025 COMPLETE CBC W/AUTO DIFF WBC: CPT

## 2019-02-06 PROCEDURE — 99214 OFFICE O/P EST MOD 30 MIN: CPT | Performed by: FAMILY MEDICINE

## 2019-02-06 PROCEDURE — 80048 BASIC METABOLIC PNL TOTAL CA: CPT

## 2019-02-06 PROCEDURE — 36415 COLL VENOUS BLD VENIPUNCTURE: CPT

## 2019-02-06 RX ORDER — ALLOPURINOL 300 MG/1
300 TABLET ORAL DAILY
Qty: 90 TAB | Refills: 1 | Status: SHIPPED
Start: 2019-02-06 | End: 2020-07-09

## 2019-02-06 RX ORDER — INDOMETHACIN 25 MG/1
CAPSULE ORAL
COMMUNITY
Start: 2018-12-26 | End: 2019-02-06 | Stop reason: SDUPTHER

## 2019-02-06 RX ORDER — INDOMETHACIN 50 MG/1
50 CAPSULE ORAL 3 TIMES DAILY
Qty: 90 CAP | Refills: 1 | Status: SHIPPED | OUTPATIENT
Start: 2019-02-06 | End: 2020-02-17

## 2019-02-06 ASSESSMENT — PATIENT HEALTH QUESTIONNAIRE - PHQ9: CLINICAL INTERPRETATION OF PHQ2 SCORE: 0

## 2019-02-06 NOTE — PATIENT INSTRUCTIONS
Low-Purine Diet  Purines are compounds that affect the level of uric acid in your body. A low-purine diet is a diet that is low in purines. Eating a low-purine diet can prevent the level of uric acid in your body from getting too high and causing gout or kidney stones or both.  What do I need to know about this diet?  · Choose low-purine foods. Examples of low-purine foods are listed in the next section.  · Drink plenty of fluids, especially water. Fluids can help remove uric acid from your body. Try to drink 8-16 cups (1.9-3.8 L) a day.  · Limit foods high in fat, especially saturated fat, as fat makes it harder for the body to get rid of uric acid. Foods high in saturated fat include pizza, cheese, ice cream, whole milk, fried foods, and gravies. Choose foods that are lower in fat and lean sources of protein. Use olive oil when cooking as it contains healthy fats that are not high in saturated fat.  · Limit alcohol. Alcohol interferes with the elimination of uric acid from your body. If you are having a gout attack, avoid all alcohol.  · Keep in mind that different people’s bodies react differently to different foods. You will probably learn over time which foods do or do not affect you. If you discover that a food tends to cause your gout to flare up, avoid eating that food. You can more freely enjoy foods that do not cause problems. If you have any questions about a food item, talk to your dietitian or health care provider.  Which foods are low, moderate, and high in purines?  The following is a list of foods that are low, moderate, and high in purines. You can eat any amount of the foods that are low in purines. You may be able to have small amounts of foods that are moderate in purines. Ask your health care provider how much of a food moderate in purines you can have. Avoid foods high in purines.  Grains  · Foods low in purines: Enriched white bread, pasta, rice, cake, cornbread, popcorn.  · Foods moderate in  purines: Whole-grain breads and cereals, wheat germ, bran, oatmeal. Uncooked oatmeal. Dry wheat bran or wheat germ.  · Foods high in purines: Pancakes, Swedish toast, biscuits, muffins.  Vegetables  · Foods low in purines: All vegetables, except those that are moderate in purines.  · Foods moderate in purines: Asparagus, cauliflower, spinach, mushrooms, green peas.  Fruits  · All fruits are low in purines.  Meats and other Protein Foods  · Foods low in purines: Eggs, nuts, peanut butter.  · Foods moderate in purines: 80-90% lean beef, lamb, veal, pork, poultry, fish, eggs, peanut butter, nuts. Crab, lobster, oysters, and shrimp. Cooked dried beans, peas, and lentils.  · Foods high in purines: Anchovies, sardines, herring, mussels, tuna, codfish, scallops, trout, and raffy. Bruner. Organ meats (such as liver or kidney). Tripe. Game meat. Goose. Sweetbreads.  Dairy  · All dairy foods are low in purines. Low-fat and fat-free dairy products are best because they are low in saturated fat.  Beverages  · Drinks low in purines: Water, carbonated beverages, tea, coffee, cocoa.  · Drinks moderate in purines: Soft drinks and other drinks sweetened with high-fructose corn syrup. Juices. To find whether a food or drink is sweetened with high-fructose corn syrup, look at the ingredients list.  · Drinks high in purines: Alcoholic beverages (such as beer).  Condiments  · Foods low in purines: Salt, herbs, olives, pickles, relishes, vinegar.  · Foods moderate in purines: Butter, margarine, oils, mayonnaise.  Fats and Oils  · Foods low in purines: All types, except gravies and sauces made with meat.  · Foods high in purines: Gravies and sauces made with meat.  Other Foods  · Foods low in purines: Sugars, sweets, gelatin. Cake. Soups made without meat.  · Foods moderate in purines: Meat-based or fish-based soups, broths, or bouillons. Foods and drinks sweetened with high-fructose corn syrup.  · Foods high in purines: High-fat desserts  (such as ice cream, cookies, cakes, pies, doughnuts, and chocolate).  Contact your dietitian for more information on foods that are not listed here.   This information is not intended to replace advice given to you by your health care provider. Make sure you discuss any questions you have with your health care provider.  Document Released: 04/13/2012 Document Revised: 05/25/2017 Document Reviewed: 11/24/2014  [employer] Interactive Patient Education © 2017 [employer]

## 2019-02-06 NOTE — ASSESSMENT & PLAN NOTE
Monday with sudden onset of left foot pain.  No trauma.  He started indomethacin and has taken 4 doses and that it has helped.  He hadn't been taking his allopurinol until he had an attack in December.  He was on 300 mg in the past.

## 2019-02-09 NOTE — PROGRESS NOTES
"Subjective:     Chief Complaint   Patient presents with   • Foot Pain     x 3 days; L foot, no hx of trauma or injury, hx of gout       Philippe Hillman is a 43 y.o. male here today for evaluation and management of:    Chronic idiopathic gout of multiple sites  Monday with sudden onset of left foot pain.  No trauma.  He started indomethacin and has taken 4 doses and that it has helped.  He hadn't been taking his allopurinol until he had an attack in December.  He was on 300 mg in the past.         No Known Allergies    Current medicines (including changes today)  Current Outpatient Prescriptions   Medication Sig Dispense Refill   • allopurinol (ZYLOPRIM) 300 MG Tab Take 1 Tab by mouth every day. 90 Tab 1   • indomethacin (INDOCIN) 50 MG Cap Take 1 Cap by mouth 3 times a day. 90 Cap 1     No current facility-administered medications for this visit.        He  has a past medical history of Measles (1983).    Patient Active Problem List    Diagnosis Date Noted   • Dry eye 08/09/2018   • Chronic idiopathic gout of multiple sites 04/20/2018   • Mallet deformity of right middle finger 08/03/2017   • Injury of finger of right hand 05/10/2017   • Hyperglycemia 09/19/2016   • Leukopenia 09/19/2016   • Well adult health check 09/12/2016   • Allergic rhinitis due to pollen 09/12/2016   • Visual changes 09/12/2016   • Dry skin 09/12/2016   • High serum low-density lipoprotein (LDL) 09/12/2016       ROS   No fever or chills.  No nausea or vomiting.  No chest pain or palpitations.  No cough or SOB.  No pain with urination or hematuria.  No black or bloody stools.       Objective:     Blood pressure 136/84, pulse (!) 108, temperature 36.4 °C (97.6 °F), temperature source Temporal, resp. rate 20, height 1.702 m (5' 7\"), weight 67 kg (147 lb 11.3 oz), SpO2 97 %. Body mass index is 23.13 kg/m².   Physical Exam:  Well developed, well nourished.  Alert, oriented in no acute distress.  Eye contact is good, speech goal directed, affect " calm  Eyes: conjunctiva non-injected, sclera non-icteric.  Neck Supple.  No adenopathy or masses in the neck or supraclavicular regions. No thyromegaly  Lungs: clear to auscultation bilaterally with good excursion. No wheezes or rhonchi  CV: regular rate and rhythm. No murmur  Left foot with tenderness to palpation of the heel and Achilles insertion.  Slight warmth, no redness or deformity        Assessment and Plan:   The following treatment plan was discussed    1. Idiopathic chronic gout of multiple sites without tophus  Check labs and x-ray. Refill medications  - CBC WITH DIFFERENTIAL; Future  - BASIC METABOLIC PANEL; Future  - URIC ACID; Future  - DX-FOOT-COMPLETE 3+ LEFT; Future  - allopurinol (ZYLOPRIM) 300 MG Tab; Take 1 Tab by mouth every day.  Dispense: 90 Tab; Refill: 1  - indomethacin (INDOCIN) 50 MG Cap; Take 1 Cap by mouth 3 times a day.  Dispense: 90 Cap; Refill: 1    2. Left foot pain  Xray to assess  - DX-FOOT-COMPLETE 3+ LEFT; Future        Any change or worsening of signs or symptoms, patient encouraged to follow-up or report to the emergency room for further evaluation. Patient understands and agrees.    Followup: Return if symptoms worsen or fail to improve.

## 2019-02-13 ENCOUNTER — OFFICE VISIT (OUTPATIENT)
Dept: MEDICAL GROUP | Facility: LAB | Age: 44
End: 2019-02-13
Payer: COMMERCIAL

## 2019-02-13 VITALS
SYSTOLIC BLOOD PRESSURE: 114 MMHG | OXYGEN SATURATION: 100 % | BODY MASS INDEX: 22.76 KG/M2 | RESPIRATION RATE: 20 BRPM | TEMPERATURE: 98.9 F | HEIGHT: 67 IN | DIASTOLIC BLOOD PRESSURE: 72 MMHG | HEART RATE: 96 BPM | WEIGHT: 145 LBS

## 2019-02-13 DIAGNOSIS — R10.9 RIGHT SIDED ABDOMINAL PAIN: ICD-10-CM

## 2019-02-13 DIAGNOSIS — S39.011A STRAIN OF ABDOMINAL WALL, INITIAL ENCOUNTER: ICD-10-CM

## 2019-02-13 LAB
APPEARANCE UR: CLEAR
BILIRUB UR STRIP-MCNC: NEGATIVE MG/DL
COLOR UR AUTO: YELLOW
GLUCOSE UR STRIP.AUTO-MCNC: NEGATIVE MG/DL
KETONES UR STRIP.AUTO-MCNC: NEGATIVE MG/DL
LEUKOCYTE ESTERASE UR QL STRIP.AUTO: NEGATIVE
NITRITE UR QL STRIP.AUTO: NEGATIVE
PH UR STRIP.AUTO: 7 [PH] (ref 5–8)
PROT UR QL STRIP: NEGATIVE MG/DL
RBC UR QL AUTO: NORMAL
SP GR UR STRIP.AUTO: 1.02
UROBILINOGEN UR STRIP-MCNC: NEGATIVE MG/DL

## 2019-02-13 PROCEDURE — 81002 URINALYSIS NONAUTO W/O SCOPE: CPT | Performed by: FAMILY MEDICINE

## 2019-02-13 PROCEDURE — 99214 OFFICE O/P EST MOD 30 MIN: CPT | Performed by: FAMILY MEDICINE

## 2019-02-13 NOTE — PATIENT INSTRUCTIONS
Abdominal Pain, Adult  Abdominal pain can be caused by many things. Often, abdominal pain is not serious and it gets better with no treatment or by being treated at home. However, sometimes abdominal pain is serious. Your health care provider will do a medical history and a physical exam to try to determine the cause of your abdominal pain.  Follow these instructions at home:  · Take over-the-counter and prescription medicines only as told by your health care provider. Do not take a laxative unless told by your health care provider.  · Drink enough fluid to keep your urine clear or pale yellow.  · Watch your condition for any changes.  · Keep all follow-up visits as told by your health care provider. This is important.  Contact a health care provider if:  · Your abdominal pain changes or gets worse.  · You are not hungry or you lose weight without trying.  · You are constipated or have diarrhea for more than 2-3 days.  · You have pain when you urinate or have a bowel movement.  · Your abdominal pain wakes you up at night.  · Your pain gets worse with meals, after eating, or with certain foods.  · You are throwing up and cannot keep anything down.  · You have a fever.  Get help right away if:  · Your pain does not go away as soon as your health care provider told you to expect.  · You cannot stop throwing up.  · Your pain is only in areas of the abdomen, such as the right side or the left lower portion of the abdomen.  · You have bloody or black stools, or stools that look like tar.  · You have severe pain, cramping, or bloating in your abdomen.  · You have signs of dehydration, such as:  ¨ Dark urine, very little urine, or no urine.  ¨ Cracked lips.  ¨ Dry mouth.  ¨ Sunken eyes.  ¨ Sleepiness.  ¨ Weakness.  This information is not intended to replace advice given to you by your health care provider. Make sure you discuss any questions you have with your health care provider.  Document Released: 09/27/2006 Document  Revised: 07/07/2017 Document Reviewed: 05/31/2017  [facility] Interactive Patient Education © 2017 [employer]

## 2019-02-20 NOTE — PROGRESS NOTES
"Subjective:     Chief Complaint   Patient presents with   • RLQ Pain     x 2 days; no dysuria, normal bowel movements        Philippe Hilmlan is a 43 y.o. male here today for evaluation and management of:    Right sided abdominal pain  Patient complains of a right upper to mid quadrant abdominal pain for the last several days.  He reports that it is just a pinching pain when he transitions from sitting to standing or standing to sitting.  He denies any nausea or vomiting.  No fever or chills.  He denies any dysuria.  He has been having normal bowel movements.       No Known Allergies    Current medicines (including changes today)  Current Outpatient Prescriptions   Medication Sig Dispense Refill   • allopurinol (ZYLOPRIM) 300 MG Tab Take 1 Tab by mouth every day. 90 Tab 1   • indomethacin (INDOCIN) 50 MG Cap Take 1 Cap by mouth 3 times a day. 90 Cap 1     No current facility-administered medications for this visit.        He  has a past medical history of Measles (1983).    Patient Active Problem List    Diagnosis Date Noted   • Right sided abdominal pain 02/13/2019   • Dry eye 08/09/2018   • Chronic idiopathic gout of multiple sites 04/20/2018   • Mallet deformity of right middle finger 08/03/2017   • Injury of finger of right hand 05/10/2017   • Hyperglycemia 09/19/2016   • Leukopenia 09/19/2016   • Well adult health check 09/12/2016   • Allergic rhinitis due to pollen 09/12/2016   • Visual changes 09/12/2016   • Dry skin 09/12/2016   • High serum low-density lipoprotein (LDL) 09/12/2016       ROS   No fever or chills.  No nausea or vomiting.  No chest pain or palpitations.  No cough or SOB.  No pain with urination or hematuria.  No black or bloody stools.       Objective:     Blood pressure 114/72, pulse 96, temperature 37.2 °C (98.9 °F), temperature source Temporal, resp. rate 20, height 1.702 m (5' 7\"), weight 65.8 kg (145 lb), SpO2 100 %. Body mass index is 22.71 kg/m².   Physical Exam:  Well developed, well " nourished.  Alert, oriented in no acute distress.  Eye contact is good, speech goal directed, affect calm  Eyes: conjunctiva non-injected, sclera non-icteric.  Neck Supple.  No adenopathy or masses in the neck or supraclavicular regions. No thyromegaly  Lungs: clear to auscultation bilaterally with good excursion. No wheezes or rhonchi  CV: regular rate and rhythm. No murmur  Abdomen: soft, slight superficial tenderness of the right obliques.  No hernia, no masses or organomegaly.  No rebound or guarding  Nontender to palpation of the right lower quadrant          Assessment and Plan:   The following treatment plan was discussed    1. Right sided abdominal pain  Normal urinalysis.  - POCT Urinalysis    2. Strain of abdominal wall, initial encounter  Discussed that this sounds like an abdominal strain.  We discussed signs and symptoms of an appendicitis.  Stretches given.  Ice, NSAIDs and rest.  Call if not improving.    Any change or worsening of signs or symptoms, patient encouraged to follow-up or report to the emergency room for further evaluation. Patient understands and agrees.    Followup: Return if symptoms worsen or fail to improve.

## 2019-02-20 NOTE — ASSESSMENT & PLAN NOTE
Patient complains of a right upper to mid quadrant abdominal pain for the last several days.  He reports that it is just a pinching pain when he transitions from sitting to standing or standing to sitting.  He denies any nausea or vomiting.  No fever or chills.  He denies any dysuria.  He has been having normal bowel movements.

## 2019-06-05 ENCOUNTER — HOSPITAL ENCOUNTER (OUTPATIENT)
Facility: MEDICAL CENTER | Age: 44
End: 2019-06-05
Payer: COMMERCIAL

## 2019-06-05 LAB
BDY FAT % MEASURED: 19.6 %
BP DIAS: 72 MMHG
BP SYS: 126 MMHG
DIABETES HTDIA: NO
EVENT NAME HTEVT: NORMAL
HYPERTENSION HTHYP: NO
SCREENING LOC CITY HTCIT: NORMAL
SCREENING LOC STATE HTSTA: NORMAL
SCREENING LOCATION HTLOC: NORMAL
SUBSCRIBER ID HTSID: NORMAL

## 2019-06-05 PROCEDURE — 80061 LIPID PANEL: CPT

## 2019-06-05 PROCEDURE — S5190 WELLNESS ASSESSMENT BY NONPH: HCPCS

## 2019-06-05 PROCEDURE — 82947 ASSAY GLUCOSE BLOOD QUANT: CPT

## 2019-06-06 LAB
CHOLEST SERPL-MCNC: 167 MG/DL (ref 100–199)
FASTING HTFAS: YES
FASTING STATUS PATIENT QL REPORTED: NORMAL
GLUCOSE SERPL-MCNC: 79 MG/DL (ref 65–99)
HDLC SERPL-MCNC: 45 MG/DL
LDLC SERPL CALC-MCNC: 96 MG/DL
TRIGL SERPL-MCNC: 131 MG/DL (ref 0–149)

## 2019-08-02 ENCOUNTER — OFFICE VISIT (OUTPATIENT)
Dept: MEDICAL GROUP | Facility: LAB | Age: 44
End: 2019-08-02
Payer: COMMERCIAL

## 2019-08-02 VITALS
BODY MASS INDEX: 22.8 KG/M2 | OXYGEN SATURATION: 95 % | DIASTOLIC BLOOD PRESSURE: 70 MMHG | SYSTOLIC BLOOD PRESSURE: 108 MMHG | WEIGHT: 145.28 LBS | TEMPERATURE: 98.4 F | HEART RATE: 100 BPM | HEIGHT: 67 IN

## 2019-08-02 DIAGNOSIS — R22.0 LEFT FACIAL SWELLING: ICD-10-CM

## 2019-08-02 PROCEDURE — 99213 OFFICE O/P EST LOW 20 MIN: CPT | Performed by: NURSE PRACTITIONER

## 2019-08-02 NOTE — PROGRESS NOTES
CC:There were no encounter diagnoses.    HISTORY OF PRESENT ILLNESS: Philippe Hillman is a 43 y.o. male established patient who presents today to discuss the following problems:    Left-sided facial swelling  Patient reports that yesterday he noticed his left face/jaw was swollen after eating a chicken nugget.  It is constant.  There was no pain associated with this but he does report a tightness sensation.  He denies redness, heat, difficulty breathing or fevers.  He does report that he has history of TMJ which does not usually cause him pain but clicking noises.  He has had no dental work done recently.  He does not have any pain inside of his mouth and only describes the discomfort as a mechanical discomfort with chewing because he could not open his jaw wide.  He has taken indomethacin which she had on hand for his gout for this and that has seemed to help with the swelling.    Health Maintenance: Completed    Patient Active Problem List    Diagnosis Date Noted   • Right sided abdominal pain 02/13/2019   • Dry eye 08/09/2018   • Chronic idiopathic gout of multiple sites 04/20/2018   • Mallet deformity of right middle finger 08/03/2017   • Injury of finger of right hand 05/10/2017   • Hyperglycemia 09/19/2016   • Leukopenia 09/19/2016   • Well adult health check 09/12/2016   • Allergic rhinitis due to pollen 09/12/2016   • Visual changes 09/12/2016   • Dry skin 09/12/2016   • High serum low-density lipoprotein (LDL) 09/12/2016        Allergies:Patient has no known allergies.    Current Outpatient Medications   Medication Sig Dispense Refill   • allopurinol (ZYLOPRIM) 300 MG Tab Take 1 Tab by mouth every day. 90 Tab 1   • indomethacin (INDOCIN) 50 MG Cap Take 1 Cap by mouth 3 times a day. 90 Cap 1     No current facility-administered medications for this visit.        Social History     Tobacco Use   • Smoking status: Never Smoker   • Smokeless tobacco: Never Used   Substance Use Topics   • Alcohol use: Yes      "Comment: once a month   • Drug use: No     Social History     Social History Narrative   • Not on file       Family History   Problem Relation Age of Onset   • Hypertension Mother    • Hypertension Father    • Heart Disease Paternal Uncle    • Stroke Paternal Uncle        ROS:    No fevers or weight loss  No headaches or sore throat  No chest pain or shortness of breath  No bowel changes  No lower extremity edema  No suicidal ideation or panic attack      EXAM:   /70 (BP Location: Left arm, Patient Position: Sitting)   Pulse 100   Temp 36.9 °C (98.4 °F) (Temporal)   Ht 1.702 m (5' 7\")   Wt 65.9 kg (145 lb 4.5 oz)   SpO2 95%  Body mass index is 22.75 kg/m².    Constitutional: Well-developed and well-nourished. Not diaphoretic. No distress.   Skin: Skin is warm and dry. No rash noted.  Head: Atraumatic without lesions.  Mild left sided facial swelling at jaw angle.  No redness, heat or tenderness to palpation  Eyes: Conjunctivae and extraocular motions are normal. Pupils are equal, round, and reactive to light. No scleral icterus.   Ears:  External ears unremarkable. Tympanic membranes clear and intact.  Nose: Nares patent. Mucosa without edema or erythema. No discharge. No facial tenderness.  Mouth/Throat: Tongue normal. Oropharynx is clear and moist. Posterior pharynx without erythema or exudates.  Neck: Supple, trachea midline. No thyromegaly present. No cervical or supraclavicular lymphadenopathy.  Cardiovascular: Regular rate and rhythm.   Chest: Effort normal. Clear to auscultation throughout. No adventitious sounds.    Neurological: Alert and oriented x 3. Sensation intact.   Psychiatric:  Behavior, mood, and affect are appropriate.       ASSESSMENT/PLAN:    1. Left facial swelling  New problem to discuss.  Advised no need for antibiotic therapy as there are no signs of infection.  This appears to be resolving on its own with indomethacin.  Advised ice/ibuprofen as needed for comfort.  There is no " airway compromise and patient verbalizes understanding of ER precautions for any worsening facial swelling or difficulty breathing..      Return if symptoms worsen or fail to improve.       Please note that this dictation was created using voice recognition software. I have made every reasonable attempt to correct obvious errors, but I expect that there are errors of grammar and possibly content that I did not discover before finalizing the note.

## 2019-08-27 ENCOUNTER — TELEPHONE (OUTPATIENT)
Dept: MEDICAL GROUP | Facility: LAB | Age: 44
End: 2019-08-27

## 2019-08-27 DIAGNOSIS — H53.9 VISUAL CHANGES: ICD-10-CM

## 2019-08-27 DIAGNOSIS — H04.129 DRY EYE: ICD-10-CM

## 2019-08-27 NOTE — TELEPHONE ENCOUNTER
Referral pending in orders    Mikmitchel Hillman would like to request a referral.   Reason: eyes are very blurry and I have been using Refresh eye drops for many months   Requested provider: Dr. Neha Rivera - I need to see an ophthalmologist   Comment:   Hi Dr. Rivera,     I'd like a referral to see an ophthalmologist and I would prefer to see a different one than whom I saw the last two years.  His diagnose has been that I have dry eyes, but I'm afraid that it's more than that since I have consistently used eye drops for many years and not just the last two years, but my vision seems to get worse and worse.     Please call my cell phone at [phone] with a referral authorization or let me know what I need to do to get this referral.       Thank you.   Philippe

## 2019-09-23 ENCOUNTER — IMMUNIZATION (OUTPATIENT)
Dept: OCCUPATIONAL MEDICINE | Facility: CLINIC | Age: 44
End: 2019-09-23

## 2019-09-23 DIAGNOSIS — Z23 NEED FOR VACCINATION: ICD-10-CM

## 2019-09-23 PROCEDURE — 90686 IIV4 VACC NO PRSV 0.5 ML IM: CPT | Performed by: PREVENTIVE MEDICINE

## 2020-02-13 DIAGNOSIS — M1A.09X0 IDIOPATHIC CHRONIC GOUT OF MULTIPLE SITES WITHOUT TOPHUS: ICD-10-CM

## 2020-02-14 NOTE — TELEPHONE ENCOUNTER
Received request via: Pharmacy    Was the patient seen in the last year in this department? Yes    Does the patient have an active prescription (recently filled or refills available) for medication(s) requested? No     INDOMETHACIN 50 MG CAPSULE

## 2020-02-17 RX ORDER — INDOMETHACIN 50 MG/1
CAPSULE ORAL
Qty: 90 CAP | Refills: 0 | Status: SHIPPED | OUTPATIENT
Start: 2020-02-17 | End: 2020-12-31 | Stop reason: SDUPTHER

## 2020-07-07 ENCOUNTER — APPOINTMENT (OUTPATIENT)
Dept: MEDICAL GROUP | Facility: LAB | Age: 45
End: 2020-07-07
Payer: COMMERCIAL

## 2020-07-09 ENCOUNTER — OFFICE VISIT (OUTPATIENT)
Dept: MEDICAL GROUP | Facility: LAB | Age: 45
End: 2020-07-09
Payer: COMMERCIAL

## 2020-07-09 ENCOUNTER — HOSPITAL ENCOUNTER (OUTPATIENT)
Dept: LAB | Facility: MEDICAL CENTER | Age: 45
End: 2020-07-09
Attending: FAMILY MEDICINE
Payer: COMMERCIAL

## 2020-07-09 VITALS
OXYGEN SATURATION: 98 % | HEIGHT: 67 IN | RESPIRATION RATE: 14 BRPM | HEART RATE: 88 BPM | WEIGHT: 140 LBS | DIASTOLIC BLOOD PRESSURE: 70 MMHG | SYSTOLIC BLOOD PRESSURE: 104 MMHG | TEMPERATURE: 98.2 F | BODY MASS INDEX: 21.97 KG/M2

## 2020-07-09 DIAGNOSIS — L23.9 ALLERGIC DERMATITIS: ICD-10-CM

## 2020-07-09 DIAGNOSIS — Z11.3 ROUTINE SCREENING FOR STI (SEXUALLY TRANSMITTED INFECTION): ICD-10-CM

## 2020-07-09 DIAGNOSIS — M1A.09X0 IDIOPATHIC CHRONIC GOUT OF MULTIPLE SITES WITHOUT TOPHUS: ICD-10-CM

## 2020-07-09 LAB
HCV AB SER QL: NORMAL
HIV 1+2 AB+HIV1 P24 AG SERPL QL IA: NORMAL
TREPONEMA PALLIDUM IGG+IGM AB [PRESENCE] IN SERUM OR PLASMA BY IMMUNOASSAY: NORMAL
URATE SERPL-MCNC: 8.9 MG/DL (ref 2.5–8.3)

## 2020-07-09 PROCEDURE — 36415 COLL VENOUS BLD VENIPUNCTURE: CPT

## 2020-07-09 PROCEDURE — 86780 TREPONEMA PALLIDUM: CPT

## 2020-07-09 PROCEDURE — 99214 OFFICE O/P EST MOD 30 MIN: CPT | Performed by: FAMILY MEDICINE

## 2020-07-09 PROCEDURE — 87591 N.GONORRHOEAE DNA AMP PROB: CPT

## 2020-07-09 PROCEDURE — 84550 ASSAY OF BLOOD/URIC ACID: CPT

## 2020-07-09 PROCEDURE — 87389 HIV-1 AG W/HIV-1&-2 AB AG IA: CPT

## 2020-07-09 PROCEDURE — 86803 HEPATITIS C AB TEST: CPT

## 2020-07-09 PROCEDURE — 87491 CHLMYD TRACH DNA AMP PROBE: CPT

## 2020-07-09 RX ORDER — TRIAMCINOLONE ACETONIDE 1 MG/G
OINTMENT TOPICAL
Qty: 60 G | Refills: 1 | Status: SHIPPED
Start: 2020-07-09 | End: 2021-02-25

## 2020-07-09 ASSESSMENT — PATIENT HEALTH QUESTIONNAIRE - PHQ9: CLINICAL INTERPRETATION OF PHQ2 SCORE: 0

## 2020-07-09 NOTE — PATIENT INSTRUCTIONS
Contact Dermatitis  Dermatitis is redness, soreness, and swelling (inflammation) of the skin. Contact dermatitis is a reaction to certain substances that touch the skin. Many different substances can cause contact dermatitis. There are two types of contact dermatitis:  · Irritant contact dermatitis. This type is caused by something that irritates your skin, such as having dry hands from washing them too often with soap. This type does not require previous exposure to the substance for a reaction to occur. This is the most common type.  · Allergic contact dermatitis. This type is caused by a substance that you are allergic to, such as poison ivy. This type occurs when you have been exposed to the substance (allergen) and develop a sensitivity to it. Dermatitis may develop soon after your first exposure to the allergen, or it may not develop until the next time you are exposed and every time thereafter.  What are the causes?  Irritant contact dermatitis is most commonly caused by exposure to:  · Makeup.  · Soaps.  · Detergents.  · Bleaches.  · Acids.  · Metal salts, such as nickel.  Allergic contact dermatitis is most commonly caused by exposure to:  · Poisonous plants.  · Chemicals.  · Jewelry.  · Latex.  · Medicines.  · Preservatives in products, such as clothing.  What increases the risk?  You are more likely to develop this condition if you have:  · A job that exposes you to irritants or allergens.  · Certain medical conditions, such as asthma or eczema.  What are the signs or symptoms?  Symptoms of this condition may occur on your body anywhere the irritant has touched you or is touched by you.  · Symptoms include:  ? Dryness or flaking.  ? Redness.  ? Cracks.  ? Itching.  ? Pain or a burning feeling.  ? Blisters.  ? Drainage of small amounts of blood or clear fluid from skin cracks.  With allergic contact dermatitis, there may also be swelling in areas such as the eyelids, mouth, or genitals.  How is this  diagnosed?  This condition is diagnosed with a medical history and physical exam.  · A patch skin test may be performed to help determine the cause.  · If the condition is related to your job, you may need to see an occupational medicine specialist.  How is this treated?  This condition is treated by checking for the cause of the reaction and protecting your skin from further contact. Treatment may also include:  · Steroid creams or ointments. Oral steroid medicines may be needed in more severe cases.  · Antibiotic medicines or antibacterial ointments, if a skin infection is present.  · Antihistamine lotion or an antihistamine taken by mouth to ease itching.  · A bandage (dressing).  Follow these instructions at home:  Skin care  · Moisturize your skin as needed.  · Apply cool compresses to the affected areas.  · Try applying baking soda paste to your skin. Stir water into baking soda until it reaches a paste-like consistency.  · Do not scratch your skin, and avoid friction to the affected area.  · Avoid the use of soaps, perfumes, and dyes.  Medicines  · Take or apply over-the-counter and prescription medicines only as told by your health care provider.  · If you were prescribed an antibiotic medicine, take or apply the antibiotic as told by your health care provider. Do not stop using the antibiotic even if your condition improves.  Bathing  · Try taking a bath with:  ? Epsom salts. Follow the instructions on the packaging. You can get these at your local pharmacy or grocery store.  ? Baking soda. Pour a small amount into the bath as directed by your health care provider.  ? Colloidal oatmeal. Follow the instructions on the packaging. You can get this at your local pharmacy or grocery store.  · Bathe less frequently, such as every other day.  · Bathe in lukewarm water. Avoid using hot water.  Bandage care  · If you were given a bandage (dressing), change it as told by your health care provider.  · Wash your hands  with soap and water before and after you change your dressing. If soap and water are not available, use hand .  General instructions  · Avoid the substance that caused your reaction. If you do not know what caused it, keep a journal to try to track what caused it. Write down:  ? What you eat.  ? What cosmetic products you use.  ? What you drink.  ? What you wear in the affected area. This includes jewelry.  · Check the affected areas every day for signs of infection. Check for:  ? More redness, swelling, or pain.  ? More fluid or blood.  ? Warmth.  ? Pus or a bad smell.  · Keep all follow-up visits as told by your health care provider. This is important.  Contact a health care provider if:  · Your condition does not improve with treatment.  · Your condition gets worse.  · You have signs of infection such as swelling, tenderness, redness, soreness, or warmth in the affected area.  · You have a fever.  · You have new symptoms.  Get help right away if:  · You have a severe headache, neck pain, or neck stiffness.  · You vomit.  · You feel very sleepy.  · You notice red streaks coming from the affected area.  · Your bone or joint underneath the affected area becomes painful after the skin has healed.  · The affected area turns darker.  · You have difficulty breathing.  Summary  · Dermatitis is redness, soreness, and swelling (inflammation) of the skin. Contact dermatitis is a reaction to certain substances that touch the skin.  · Symptoms of this condition may occur on your body anywhere the irritant has touched you or is touched by you.  · This condition is treated by figuring out what caused the reaction and protecting your skin from further contact. Treatment may also include medicines and skin care.  · Avoid the substance that caused your reaction. If you do not know what caused it, keep a journal to try to track what caused it.  · Contact a health care provider if your condition gets worse or you have signs  of infection such as swelling, tenderness, redness, soreness, or warmth in the affected area.  This information is not intended to replace advice given to you by your health care provider. Make sure you discuss any questions you have with your health care provider.  Document Released: 12/15/2001 Document Revised: 04/08/2020 Document Reviewed: 07/03/2019  Elsevier Patient Education © 2020 [employer]  Glucosamine tablets and capsules  What is this medicine?  GLUCOSAMINE (gloo KOH gerry meen) is a dietary supplement. It is promoted to keep joints healthy and working smoothly. The FDA has not approved this supplement for any medical use.  This supplement may be used for other purposes; ask your health care provider or pharmacist if you have questions.  This medicine may be used for other purposes; ask your health care provider or pharmacist if you have questions.  COMMON BRAND NAME(S): Genicin, OptiFlex-G  What should I tell my health care provider before I take this medicine?  They need to know if you have any of these conditions:  · diabetes  · kidney disease  · liver disease  · stomach or intestinal problems  · an unusual or allergic reaction to glucosamine, other herbs, plants, medicines, foods, dyes, or preservatives  · pregnant or trying to get pregnant  · breast-feeding  How should I use this medicine?  Take this medicine by mouth with a glass of water. Follow the directions on the package labeling, or take as directed by your health care professional. Take your doses at regular intervals. If this medicine upsets your stomach, take it with food. Do not take this medicine more often than directed.  Contact your pediatrician regarding the use of this medicine in children. Special care may be needed.  Overdosage: If you think you have taken too much of this medicine contact a poison control center or emergency room at once.  NOTE: This medicine is only for you. Do not share this medicine with others.  What if I  miss a dose?  If you miss a dose, take it as soon as you can. If it is almost time for your next dose, take only that dose. Do not take double or extra doses.  What may interact with this medicine?  · warfarin  This list may not describe all possible interactions. Give your health care provider a list of all the medicines, herbs, non-prescription drugs, or dietary supplements you use. Also tell them if you smoke, drink alcohol, or use illegal drugs. Some items may interact with your medicine.  What should I watch for while using this medicine?  See your doctor if your symptoms do not get better or if they get worse.  If you are scheduled for any medical or dental procedure, tell your healthcare provider that you are taking this medicine. You may need to stop taking this medicine before the procedure.  Herbal or dietary supplements are not regulated like medicines. Rigid  standards are not required for dietary supplements. The purity and strength of these products can vary. The safety and effect of this dietary supplement for a certain disease or illness is not well known. This product is not intended to diagnose, treat, cure or prevent any disease.  The Food and Drug Administration suggests the following to help consumers protect themselves:  · Always read product labels and follow directions.  · Natural does not mean a product is safe for humans to take.  · Look for products that include USP after the ingredient name. This means that the  followed the standards of the [place] Pharmacopoeia.  · Supplements made or sold by a nationally known food or drug company are more likely to be made under tight controls. You can write to the company for more information about how the product was made.  What side effects may I notice from receiving this medicine?  Side effects that you should report to your doctor or health care professional as soon as possible:  · allergic reactions like skin rash, itching  or hives, swelling of the face, lips, or tongue  · breathing problems  · constipation or diarrhea  · loss of appetite  · stomach pain  Side effects that usually do not require medical attention (report to your doctor or health care professional if they continue or are bothersome):  · gas  · headache  · nausea  · stomach upset  This list may not describe all possible side effects. Call your doctor for medical advice about side effects. You may report side effects to FDA at 7-670-IVZ-2786.  Where should I keep my medicine?  Keep out of the reach of children.  Store at room temperature or as directed on the package label. Protect from moisture. Throw away any unused medicine after the expiration date.  NOTE: This sheet is a summary. It may not cover all possible information. If you have questions about this medicine, talk to your doctor, pharmacist, or health care provider.  © 2020 Elsevier/Gold [employer] (2009-04-29 10:56:07)

## 2020-07-10 LAB
C TRACH DNA SPEC QL NAA+PROBE: NEGATIVE
N GONORRHOEA DNA SPEC QL NAA+PROBE: NEGATIVE
SPECIMEN SOURCE: NORMAL

## 2020-07-14 NOTE — ASSESSMENT & PLAN NOTE
Patient complains of a rash that is itching in nature.  He has a history of dry skin and allergic rhinitis.  He was traveling and stated his sister's house.  The rash is primarily on his arms and lower legs.  He denies any exposure to poison oak or ivy

## 2020-07-14 NOTE — ASSESSMENT & PLAN NOTE
Patient has not been taking the allopurinol as his gout has only flared up once over the last year.  He just uses indomethacin when he gets to flare and this usually only occurs if he eats too much shellfish.

## 2020-07-14 NOTE — PROGRESS NOTES
Subjective:     Chief Complaint   Patient presents with   • Rash       Philippe Hillman is a 44 y.o. male here today for evaluation and management of:    Allergic dermatitis  Patient complains of a rash that is itching in nature.  He has a history of dry skin and allergic rhinitis.  He was traveling and stated his sister's house.  The rash is primarily on his arms and lower legs.  He denies any exposure to poison oak or ivy    Chronic idiopathic gout of multiple sites  Patient has not been taking the allopurinol as his gout has only flared up once over the last year.  He just uses indomethacin when he gets to flare and this usually only occurs if he eats too much shellfish.       No Known Allergies    Current medicines (including changes today)  Current Outpatient Medications   Medication Sig Dispense Refill   • triamcinolone acetonide (KENALOG) 0.1 % Ointment Apply to rash BID until clear 60 g 1   • indomethacin (INDOCIN) 50 MG Cap TAKE ONE CAPSULE BY MOUTH THREE TIMES A DAY 90 Cap 0     No current facility-administered medications for this visit.        He  has a past medical history of Measles (1983).    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  No nausea or vomiting.  No chest pain or palpitations.  No cough or SOB.  No pain with urination or hematuria.  No black or bloody stools.       Objective:     /70 (BP Location: Right arm, Patient Position: Sitting, BP Cuff Size: Adult)   Pulse 88   Temp 36.8 °C  "(98.2 °F)   Resp 14   Ht 1.702 m (5' 7\")   Wt 63.5 kg (140 lb)   SpO2 98%  Body mass index is 21.93 kg/m².   Physical Exam:  Well developed, well nourished.  Alert, oriented in no acute distress.  Eye contact is good, speech goal directed, affect calm  Eyes: conjunctiva non-injected, sclera non-icteric.  Neck Supple.  No adenopathy or masses in the neck or supraclavicular regions. No thyromegaly  Lungs: clear to auscultation bilaterally with good excursion. No wheezes or rhonchi  CV: regular rate and rhythm. No murmur  Skin: Erythematous excoriated areas on the arms and lower legs.  No lesions between the webbing of the fingers or toes.  No linear lesions.      Assessment and Plan:   The following treatment plan was discussed    1. Allergic dermatitis  Discussed this is most likely an allergic reaction.  He had been out camping and discussed that these could be mosquito or gnat bites.  Triamcinolone to affected area.  Patient to call if not improving  - triamcinolone acetonide (KENALOG) 0.1 % Ointment; Apply to rash BID until clear  Dispense: 60 g; Refill: 1    2. Idiopathic chronic gout of multiple sites without tophus  Check uric acid.  Refill indomethacin for as needed use  - URIC ACID; Future    3. Routine screening for STI (sexually transmitted infection)  Patient would like STI testing.  Labs ordered.  Await results.  Safe sex practices discussed  - HEP C VIRUS ANTIBODY; Future  - HIV AG/AB COMBO ASSAY SCREENING; Future  - Chlamydia/GC PCR Urine Or Swab; Future    Any change or worsening of signs or symptoms, patient encouraged to follow-up or report to the emergency room for further evaluation. Patient understands and agrees.    Followup: Return in about 1 year (around 7/9/2021).           "

## 2020-07-27 ENCOUNTER — OFFICE VISIT (OUTPATIENT)
Dept: MEDICAL GROUP | Facility: LAB | Age: 45
End: 2020-07-27
Payer: COMMERCIAL

## 2020-07-27 VITALS
RESPIRATION RATE: 12 BRPM | OXYGEN SATURATION: 98 % | BODY MASS INDEX: 22.29 KG/M2 | TEMPERATURE: 98.2 F | DIASTOLIC BLOOD PRESSURE: 68 MMHG | WEIGHT: 142 LBS | SYSTOLIC BLOOD PRESSURE: 98 MMHG | HEART RATE: 80 BPM | HEIGHT: 67 IN

## 2020-07-27 DIAGNOSIS — M1A.09X0 IDIOPATHIC CHRONIC GOUT OF MULTIPLE SITES WITHOUT TOPHUS: ICD-10-CM

## 2020-07-27 DIAGNOSIS — L23.9 ALLERGIC DERMATITIS: ICD-10-CM

## 2020-07-27 DIAGNOSIS — Z00.00 WELL ADULT HEALTH CHECK: ICD-10-CM

## 2020-07-27 PROCEDURE — 99396 PREV VISIT EST AGE 40-64: CPT | Performed by: FAMILY MEDICINE

## 2020-07-27 RX ORDER — ALLOPURINOL 100 MG/1
100 TABLET ORAL DAILY
Qty: 30 TAB | Refills: 6 | Status: SHIPPED | OUTPATIENT
Start: 2020-07-27 | End: 2021-02-26 | Stop reason: SDUPTHER

## 2020-07-27 NOTE — PATIENT INSTRUCTIONS
Bedbugs    Bedbugs are tiny bugs that live in and around beds. During the day, they stay hidden. At night, they come out and bite.  Where are bedbugs found?  Bedbugs can be found anywhere. It does not matter if a place is clean or dirty. Bedbugs are found in:  · Hotels.  · Shelters.  · Dorms.  · Hospitals.  · Nursing homes.  · Places where there are many birds or bats.  What are bedbug bites like?    A bedbug bite makes a small red bump with a darker red dot in the middle. The bump may show soon after you are bitten or one or more days later. Bedbug bites usually do not hurt, but they may itch.  Most people do not need treatment for bedbug bites. The bumps usually go away on their own in a few days.  If you have a lot of bedbug bites and they feel very itchy:  · Do not scratch the bite areas.  · You may put one of these on the bite area as told by your doctor:  ? Baking soda paste. Make this by adding water to baking soda.  ? Cortisone cream.  ? Calamine lotion.  How do I check for bedbugs?  Adult bedbugs are reddish-brown, oval, and flat. They are very small, and they cannot fly. Young bedbugs are whitish-yellow and are smaller than the adult bedbugs.  Use a flashlight to look for bedbugs in these places:  · On mattresses, bed frames, headboards, and box springs.  · On drapes and curtains in bedrooms.  · Under the carpet in bedrooms.  · Behind electrical outlets.  · Behind any wallpaper that is peeling.  · Inside luggage.  Also look for black or red spots or stains on or near the bed.  What should I do if I find bedbugs?  When traveling  Check your clothes, suitcase, and belongings for bedbugs before you go back home. You may want to throw away anything that has bedbugs on it.  At home  Your bedroom may need to be treated by a pest control expert. You may also need to throw away mattresses or luggage. To help stop bedbugs from coming back, you may want to:  · Wash your clothes and bedding in water that is hotter  than 120°F (48.9°C). Dry them on a hot setting.  · Put a plastic cover over your mattress.  · When you sleep, wear pajamas that have long sleeves and pant legs. Bedbugs usually bite skin that is not covered.  · Vacuum often around the bed and in all of the cracks where the bugs might hide.  · Check all used furniture, bedding, or clothes that you bring into your home.  · Get rid of bird nests and bat roosts that are near your home.  Where to find more information  · [place] Environmental Protection Agency (EPA): www.epa.gov/bedbugs  Summary  · Bedbugs are tiny bugs that live in and around beds.  · Bedbugs are often found in hotels, shelters, dorms, hospitals, and nursing homes.  · A bedbug bite makes a small red bump with a darker red dot in the middle.  · Bedbug bites usually do not hurt, but they may itch. Most people do not need treatment for the bites.  · If you find bedbugs at home, your bedroom may need to be treated by a pest control expert.  This information is not intended to replace advice given to you by your health care provider. Make sure you discuss any questions you have with your health care provider.  Document Released: 04/03/2012 Document Revised: 11/30/2018 Document Reviewed: 08/10/2018  [employer] Patient Education © 2020 [employer]

## 2020-07-30 PROCEDURE — 99284 EMERGENCY DEPT VISIT MOD MDM: CPT

## 2020-07-30 PROCEDURE — 304999 HCHG REPAIR-SIMPLE/INTERMED LEVEL 1

## 2020-07-30 PROCEDURE — 303747 HCHG EXTRA SUTURE

## 2020-07-30 PROCEDURE — 304217 HCHG IRRIGATION SYSTEM

## 2020-07-30 NOTE — PROGRESS NOTES
"Subjective:     Chief Complaint   Patient presents with   • Annual Exam       Philippe Hillman is a 44 y.o. male here today for evaluation and management of:  Patient is here today for his annual exam.  He continues to have an itchy rash that waxes and wanes.  He is very concerned about bedbugs that he has not seen any.  It is primarily his neck and then his forearms.    No Known Allergies    Current medicines (including changes today)  Current Outpatient Medications   Medication Sig Dispense Refill   • allopurinol (ZYLOPRIM) 100 MG Tab Take 1 Tab by mouth every day. 30 Tab 6   • triamcinolone acetonide (KENALOG) 0.1 % Ointment Apply to rash BID until clear 60 g 1   • indomethacin (INDOCIN) 50 MG Cap TAKE ONE CAPSULE BY MOUTH THREE TIMES A DAY 90 Cap 0     No current facility-administered medications for this visit.        He  has a past medical history of Measles (1983).    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  No nausea or vomiting.  No chest pain or palpitations.  No cough or SOB.  No pain with urination or hematuria.  No black or bloody stools.       Objective:     BP (!) 98/68 (BP Location: Right arm, Patient Position: Sitting, BP Cuff Size: Adult)   Pulse 80   Temp 36.8 °C (98.2 °F)   Resp 12   Ht 1.702 m (5' 7\")   Wt 64.4 kg (142 lb)   SpO2 98%  Body mass index is 22.24 kg/m².   Physical Exam:  Well developed, well nourished.  Alert, oriented in no acute distress.  Eye contact is good, speech goal " directed, affect calm  Eyes: conjunctiva non-injected, sclera non-icteric.  Ears: Pinna normal. TM pearly gray.   Nose: Nares are patent.  Normal mucosa  Mouth: Oral mucous membranes pink and moist with no lesions.  Neck Supple.  No adenopathy or masses in the neck or supraclavicular regions. No thyromegaly  Lungs: clear to auscultation bilaterally with good excursion. No wheezes or rhonchi  CV: regular rate and rhythm. No murmur  Abdomen: soft, nontender, no masses or organomegaly.  No rebound or guarding  Ext: no edema, color normal, vascularity normal, temperature normal  Genitalia: normal uncircumcised penis, no urethral discharge, scrotal contents normal to inspection and palpation, no hernia detected            Assessment and Plan:   The following treatment plan was discussed    1. Well adult health check  Routine anticipatory guidance.  No special services needed at this time.  Labs reviewed.    2. Allergic dermatitis  Refer to dermatology for further evaluation and treatment  - REFERRAL TO DERMATOLOGY    3. Idiopathic chronic gout of multiple sites without tophus  Allopurinol 100 mg daily.  We discussed risk of gout attacks or kidney stones.  Increase fluids.  Low purine diet already shared with patient  - allopurinol (ZYLOPRIM) 100 MG Tab; Take 1 Tab by mouth every day.  Dispense: 30 Tab; Refill: 6    Any change or worsening of signs or symptoms, patient encouraged to follow-up or report to the emergency room for further evaluation. Patient understands and agrees.    Followup: Return in about 1 year (around 7/27/2021).

## 2020-07-31 ENCOUNTER — HOSPITAL ENCOUNTER (EMERGENCY)
Facility: MEDICAL CENTER | Age: 45
End: 2020-07-31
Attending: EMERGENCY MEDICINE
Payer: COMMERCIAL

## 2020-07-31 VITALS
HEART RATE: 98 BPM | SYSTOLIC BLOOD PRESSURE: 122 MMHG | OXYGEN SATURATION: 95 % | HEIGHT: 67 IN | TEMPERATURE: 97.8 F | RESPIRATION RATE: 18 BRPM | WEIGHT: 142 LBS | DIASTOLIC BLOOD PRESSURE: 89 MMHG | BODY MASS INDEX: 22.29 KG/M2

## 2020-07-31 DIAGNOSIS — S09.90XA CLOSED HEAD INJURY, INITIAL ENCOUNTER: ICD-10-CM

## 2020-07-31 DIAGNOSIS — S01.81XA LACERATION OF FOREHEAD, INITIAL ENCOUNTER: ICD-10-CM
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
## 2020-07-31 PROCEDURE — 90471 IMMUNIZATION ADMIN: CPT

## 2020-07-31 PROCEDURE — 700101 HCHG RX REV CODE 250: Performed by: STUDENT IN AN ORGANIZED HEALTH CARE EDUCATION/TRAINING PROGRAM

## 2020-07-31 PROCEDURE — 90715 TDAP VACCINE 7 YRS/> IM: CPT | Performed by: EMERGENCY MEDICINE

## 2020-07-31 PROCEDURE — 700102 HCHG RX REV CODE 250 W/ 637 OVERRIDE(OP): Performed by: STUDENT IN AN ORGANIZED HEALTH CARE EDUCATION/TRAINING PROGRAM

## 2020-07-31 PROCEDURE — 304999 HCHG REPAIR-SIMPLE/INTERMED LEVEL 1

## 2020-07-31 PROCEDURE — 700111 HCHG RX REV CODE 636 W/ 250 OVERRIDE (IP): Performed by: EMERGENCY MEDICINE

## 2020-07-31 PROCEDURE — A9270 NON-COVERED ITEM OR SERVICE: HCPCS | Performed by: STUDENT IN AN ORGANIZED HEALTH CARE EDUCATION/TRAINING PROGRAM

## 2020-07-31 RX ORDER — LIDOCAINE HYDROCHLORIDE AND EPINEPHRINE 10; 10 MG/ML; UG/ML
10 INJECTION, SOLUTION INFILTRATION; PERINEURAL ONCE
Status: COMPLETED | OUTPATIENT
Start: 2020-07-31 | End: 2020-07-31

## 2020-07-31 RX ORDER — HYDROCODONE BITARTRATE AND ACETAMINOPHEN 10; 325 MG/1; MG/1
1 TABLET ORAL ONCE
Status: COMPLETED | OUTPATIENT
Start: 2020-07-31 | End: 2020-07-31

## 2020-07-31 RX ADMIN — LIDOCAINE HYDROCHLORIDE,EPINEPHRINE BITARTRATE 10 ML: 10; .01 INJECTION, SOLUTION INFILTRATION; PERINEURAL at 00:45

## 2020-07-31 RX ADMIN — CLOSTRIDIUM TETANI TOXOID ANTIGEN (FORMALDEHYDE INACTIVATED), CORYNEBACTERIUM DIPHTHERIAE TOXOID ANTIGEN (FORMALDEHYDE INACTIVATED), BORDETELLA PERTUSSIS TOXOID ANTIGEN (GLUTARALDEHYDE INACTIVATED), BORDETELLA PERTUSSIS FILAMENTOUS HEMAGGLUTININ ANTIGEN (FORMALDEHYDE INACTIVATED), BORDETELLA PERTUSSIS PERTACTIN ANTIGEN, AND BORDETELLA PERTUSSIS FIMBRIAE 2/3 ANTIGEN 0.5 ML: 5; 2; 2.5; 5; 3; 5 INJECTION, SUSPENSION INTRAMUSCULAR at 00:43

## 2020-07-31 RX ADMIN — HYDROCODONE BITARTRATE AND ACETAMINOPHEN 1 TABLET: 10; 325 TABLET ORAL at 00:43

## 2020-07-31 NOTE — ED PROVIDER NOTES
"                                                                          ED Provider Note    Scribed for Ole Way M.D. by Jodi De La Torre. 7/31/2020  12:14 AM    CHIEF COMPLAINT  Chief Complaint   Patient presents with   • Head Laceration   • T-5000 FALL       HPI  Philippe Hillman is a 44 y.o. male who presents to the ED for evaluation of forehead laceration following mechanical ground level fall onset 45 minutes prior to arrival. Patient endorses associated headache secondary to fall. Patient was cooking dinner, when he lost his balance and fell forward, hitting his head. No syncope, nausea, emesis, or confusion. He lives by himself. Patient does not drink alcohol. Patient has history of gout and takes indomethacin as treatment. He is not prescribed blood thinners.    REVIEW OF SYSTEMS  See HPI for further details. All other systems are negative.   Pertinent positives include: head laceration and headache  Pertinent negatives include:  syncope, nausea, emesis, or confusion    PAST MEDICAL HISTORY   has a past medical history of Measles (1983).    SOCIAL HISTORY  Social History     Tobacco Use   • Smoking status: Never Smoker   • Smokeless tobacco: Never Used   Substance and Sexual Activity   • Alcohol use: Yes     Comment: once a month   • Drug use: No   • Sexual activity: Not Currently       SURGICAL HISTORY   has a past surgical history that includes eye surgery.    CURRENT MEDICATIONS  Home Medications     Reviewed by Delaney Villegas R.N. (Registered Nurse) on 07/30/20 at 2342  Med List Status: Not Addressed   Medication Last Dose Status   allopurinol (ZYLOPRIM) 100 MG Tab  Active   indomethacin (INDOCIN) 50 MG Cap  Active   triamcinolone acetonide (KENALOG) 0.1 % Ointment  Active                ALLERGIES  No Known Allergies    PHYSICAL EXAM  VITAL SIGNS: /89   Pulse (!) 116   Temp 36.1 °C (97 °F)   Resp 16   Ht 1.702 m (5' 7\")   Wt 64.4 kg (142 lb)   SpO2 94%   BMI 22.24 kg/m²    Pulse ox " interpretation: I interpret this pulse ox as normal.  Genl: M sitting in chair comfortably, speaking clearly, appears in no acute distress   Head: Tenderness to the right temporal region, Right eyebrow has a 3cm laceration  ENT: Mucous membranes moist, posterior pharynx clear, uvula midline, nares patent bilaterally   Eyes: Normal sclera, pupils equal round reactive to light  Neck: Supple, FROM, no LAD appreciated   Pulmonary: Lungs are clear to auscultation bilaterally  Chest: No TTP  CV:  RRR, no murmur appreciated, pulses 2+ in both upper and lower extremities,  Abdomen: soft, NT/ND; no rebound/guarding, no masses palpated, no HSM   : no CVA or suprapubic tenderness   Musculoskeletal: Pain free ROM of the neck. Moving upper and lower extremities and spontaneous in coordinated fashion  Neuro: A&Ox4 (person, place, time, situation), speech fluent, gait steady, no focal deficits appreciated, No cerebellar signs. Sensation is grossly intact in the distal upper and lower extremities  5/5 strength in  and dorsiflexion/plantar flexion of the ankles  Psych: Patient has an appropriate affect and behavior  Skin: No rash or lesions.  No pallor or jaundice.  No cyanosis.  Warm and dry.     DIAGNOSTIC STUDIES / PROCEDURES    Procedure - Laceration closure  Resident: Dr. Blackwell  Patient was positioned appropriately, 2cc lidocaine with epinephrine was used as a local anesthetic. 100cc NaCl was used for irrigation. Patient was sterile draped with wound exposed. 5x  5.0 ethilon sutures were placed with good approximation. Procedure tolerated without complications. Wound dressed with bacitracin and sterile gauze.     COURSE & MEDICAL DECISION MAKING  Pertinent Labs & Imaging studies reviewed. (See chart for details)    Differential diagnoses include but not limited to:   Laceration: r/o retained foreign body, tendon injury, vascular injury, joint injury, nerve injury,  devitalized tissue.    12:14 AM - Patient seen and examined  at bedside. Patient will be treated with lidocaine-epi.     1:15 AM- Laceration procedure performed at this time.    Medical Decision Making:   In short this is a 44-year-old male who comes in the emergency department for left forehead laceration after ground-level fall.  He was cooking in the kitchen, slipped and fell forward and hit his right forehead.  There was no loss of consciousness or focal neurologic deficits to warrant CT scan of the head.  He is not on any blood thinners.  He has a 3 cm laceration of the right eyebrow, which was repaired with 5-0 Ethilon sutures after infiltration with lidocaine with epinephrine.  Patient will be discharged home with antibiotic ointment, and instructed to follow-up with primary care in 1 week for suture removal and wound recheck.    ATTENDING ATTESTATOIN  I independently evaluated the patient and repeated the important components of the history and physical. I discussed the management with the resident. I have reviewed and agree with the pertinent clinical information above including history, exam, study findings and recommendations    No acute prodromal symptomology prior to the fall, no other global findings of acute traumatic injury.  There is no right-sided vision changes, no signs of orbital entrapment, otherwise everything is well-appearing with the exception of his laceration which is repaired per my note above.    DISPOSITION:  Patient will be discharged home in stable condition.    FOLLOW UP:  Neha Rivera M.D.  [address] [id number] [phone]    Schedule an appointment as soon as possible for a visit in 1 week  As needed, For suture removal, For wound re-check    [facility], Emergency Dept  [facility] [facility] [address] [phone]  In 1 week  For suture removal, For wound re-check    OUTPATIENT MEDICATIONS:  Discharge Medication List as of 7/31/2020  1:18 AM        FINAL IMPRESSION  Visit  Diagnoses     ICD-10-CM   1. Closed head injury, initial encounter  S09.90XA   2. Fall, initial encounter  W19.XXXA   3. Laceration of forehead, initial encounter  S01.81XA     Jodi CRENSHAW (Avni), am scribing for, and in the presence of, Ole Way M.D..    Electronically signed by: Jodi De La Torre (Avni), 7/31/2020    Ole CRENSHAW M.D. personally performed the services described in this documentation, as scribed by Jodi De La Torre in my presence, and it is both accurate and complete.    I, Kip Blackwell MD, saw and cared for the patient under the direct supervision of the attending physician    The note accurately reflects work and decisions made by me.  Ole Way M.D.  7/31/2020  3:21 AM

## 2020-07-31 NOTE — ED TRIAGE NOTES
Pt tripped and fell hitting his head on a counter, denies LOC, approx 3cm lac above R eyebrow, bleeding controlled.  Denies any other complaints

## 2020-07-31 NOTE — ED NOTES
Bacitracin applied to wound and dressed  Pt discharged home. Pt verbalized understand of discharge and medication instructions, all questions addressed. Pt advised to follow-up with PCP or return to ED for any new or worsening of symptoms. Pt is ambulating well and steady on feet. VSS.

## 2020-07-31 NOTE — ED NOTES
Agree with triage note. Pt report mechanical GLF, no LOC, denies thinners. Bleeding controlled. Neuro intact

## 2020-08-06 ENCOUNTER — OFFICE VISIT (OUTPATIENT)
Dept: MEDICAL GROUP | Facility: LAB | Age: 45
End: 2020-08-06
Payer: COMMERCIAL

## 2020-08-06 VITALS
OXYGEN SATURATION: 95 % | WEIGHT: 139 LBS | DIASTOLIC BLOOD PRESSURE: 80 MMHG | BODY MASS INDEX: 21.82 KG/M2 | HEIGHT: 67 IN | RESPIRATION RATE: 16 BRPM | SYSTOLIC BLOOD PRESSURE: 110 MMHG | HEART RATE: 96 BPM | TEMPERATURE: 97.2 F

## 2020-08-06 DIAGNOSIS — R21 RASH: ICD-10-CM

## 2020-08-06 DIAGNOSIS — S01.81XA FACIAL LACERATION, INITIAL ENCOUNTER: ICD-10-CM

## 2020-08-06 PROCEDURE — 99212 OFFICE O/P EST SF 10 MIN: CPT | Performed by: FAMILY MEDICINE

## 2020-08-06 NOTE — PROGRESS NOTES
Subjective:     Chief Complaint   Patient presents with   • Suture / Staple Removal     forehead.        Philippe Hillman is a 44 y.o. male here today for evaluation and management of:    Rash  Patient reports the rash on his neck has improved but he has some new bite marks on his left foot and ankle.  They are quite itchy.  He has changed his laundry and has not seen any bugs.  A referral to dermatology was placed 10 days ago but he has not scheduled appointment yet.    Laceration of face  Patient tripped and fell and hit his right eyebrow last week.  He was seen on 7/31/2020 at the emergency room where he received stitches.  He is feeling much better and is here for suture removal.       No Known Allergies    Current medicines (including changes today)  Current Outpatient Medications   Medication Sig Dispense Refill   • allopurinol (ZYLOPRIM) 100 MG Tab Take 1 Tab by mouth every day. 30 Tab 6   • triamcinolone acetonide (KENALOG) 0.1 % Ointment Apply to rash BID until clear 60 g 1   • indomethacin (INDOCIN) 50 MG Cap TAKE ONE CAPSULE BY MOUTH THREE TIMES A DAY 90 Cap 0     No current facility-administered medications for this visit.        He  has a past medical history of Measles (1983).    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  No nausea or vomiting.  No chest pain or palpitations.  No cough or SOB.  No pain with urination or hematuria.  No black or bloody  "stools.       Objective:     /80 (BP Location: Right arm, Patient Position: Sitting, BP Cuff Size: Adult)   Pulse 96   Temp 36.2 °C (97.2 °F) (Temporal)   Resp 16   Ht 1.702 m (5' 7\")   Wt 63 kg (139 lb)   SpO2 95%  Body mass index is 21.77 kg/m².   Physical Exam:  Well developed, well nourished.  Alert, oriented in no acute distress.  Eye contact is good, speech goal directed, affect calm  Eyes: conjunctiva non-injected, sclera non-icteric.  Well-healing wound in the right eyebrow with 5 sutures present.  No erythema.  Ecchymosis under the right eye  Skin: Small erythematous raised lesions on the foot and ankles consistent with insect bite      Assessment and Plan:   The following treatment plan was discussed    1. Facial laceration, initial encounter  Sutures removed without difficulty.  Wound care discussed.  Follow-up as needed    2. Rash  Patient given information to call and schedule an appointment with dermatology.  Benadryl as needed for symptoms.    Any change or worsening of signs or symptoms, patient encouraged to follow-up or report to the emergency room for further evaluation. Patient understands and agrees.    Followup: Return if symptoms worsen or fail to improve.           "

## 2020-08-06 NOTE — ASSESSMENT & PLAN NOTE
Patient tripped and fell and hit his right eyebrow last week.  He was seen on 7/31/2020 at the emergency room where he received stitches.  He is feeling much better and is here for suture removal.

## 2020-08-06 NOTE — ASSESSMENT & PLAN NOTE
Patient reports the rash on his neck has improved but he has some new bite marks on his left foot and ankle.  They are quite itchy.  He has changed his laundry and has not seen any bugs.  A referral to dermatology was placed 10 days ago but he has not scheduled appointment yet.

## 2020-09-09 ENCOUNTER — HOSPITAL ENCOUNTER (OUTPATIENT)
Dept: LAB | Facility: MEDICAL CENTER | Age: 45
End: 2020-09-09
Payer: COMMERCIAL

## 2020-09-09 LAB
BDY FAT % MEASURED: 16.4 %
BP DIAS: 78 MMHG
BP SYS: 119 MMHG
CHOLEST SERPL-MCNC: 164 MG/DL (ref 100–199)
DIABETES HTDIA: NO
EVENT NAME HTEVT: NORMAL
FASTING HTFAS: YES
GLUCOSE SERPL-MCNC: 100 MG/DL (ref 65–99)
HDLC SERPL-MCNC: 44 MG/DL
HYPERTENSION HTHYP: NO
LDLC SERPL CALC-MCNC: 97 MG/DL
SCREENING LOC CITY HTCIT: NORMAL
SCREENING LOC STATE HTSTA: NORMAL
SCREENING LOCATION HTLOC: NORMAL
SMOKING HTSMO: NO
SUBSCRIBER ID HTSID: NORMAL
TRIGL SERPL-MCNC: 117 MG/DL (ref 0–149)

## 2020-09-09 PROCEDURE — 82947 ASSAY GLUCOSE BLOOD QUANT: CPT

## 2020-09-09 PROCEDURE — 80061 LIPID PANEL: CPT

## 2020-09-09 PROCEDURE — 36415 COLL VENOUS BLD VENIPUNCTURE: CPT

## 2020-09-09 PROCEDURE — S5190 WELLNESS ASSESSMENT BY NONPH: HCPCS

## 2020-09-18 ENCOUNTER — OFFICE VISIT (OUTPATIENT)
Dept: DERMATOLOGY | Facility: IMAGING CENTER | Age: 45
End: 2020-09-18
Payer: COMMERCIAL

## 2020-09-18 VITALS — HEIGHT: 67 IN | BODY MASS INDEX: 21.82 KG/M2 | WEIGHT: 139 LBS
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
## 2020-09-18 DIAGNOSIS — L29.9 ITCHING: ICD-10-CM

## 2020-09-18 PROCEDURE — 99203 OFFICE O/P NEW LOW 30 MIN: CPT | Performed by: DERMATOLOGY

## 2020-09-18 NOTE — PROGRESS NOTES
"CC:  Rash     Subjective:new patient here for rash/possible bites - \"bedbugs\"    HPI: rash , per patient bug bite 06/2020 thinks it was cause of the rash starting on neck , both feet and forearms   Aggravating factors:  Bug bites   Alleviating factors: OTC benadryl topical makes it feel better   New creams/topicals: triamcinolone , some improvement .  Stopped using as did not like smell  New medications (up to last 6 months): no  New travel: [place] to visit sister   Other exposures: no  Treatments: no     History of skin cancer: No  History of biopsies:No  History of blistering/severe sunburns:No  Family history of skin cancer:No  Family history of atypical moles:No    ROS: no fevers/chills. ++ itch.  No cough  DermPMH: no skin cancer/melanoma  No problem-specific Assessment & Plan notes found for this encounter.    Relevant PMH: NC  Social: never smoker    PE: Gen:WDWN male in NAD. Skin: face-appearing spared.  Back - isolated erythematous papule/urticarial plaque.,  Left arm - few scattered erythematous macules/papules + urticarial plaque surrounding.  Right arm appearing spared.  Photos on phone showing feet with linear \"breakfast/lunch/dinner\" dist of erythematous papules.      A/P: bites, very suspicious for possible bed bug bites:  -photo on phone showing bug with appearance similar to bed bug  -images of feet showing dist suspicious for bed bug bites  -counseled re: bed bug appearance/CDC information  -sent home with sterile urine container and biohazard bag to bring back with found bugs  -cont sx care: benadryl cream OTC vs TAC 0.1% ointment BID PRN itching  -OCT antihistamine  -if bug identified, rec home inspection#2 and trx   -f/u 1 month, pt preference    I have reviewed medications relevant to my specialty.    This was a 30-minute face-to-face visit; greater than 50% was spent counseling the patient regarding skin findings and treatments and sun protection/skin cancer detection.          "

## 2020-09-30 ENCOUNTER — IMMUNIZATION (OUTPATIENT)
Dept: OCCUPATIONAL MEDICINE | Facility: CLINIC | Age: 45
End: 2020-09-30

## 2020-09-30 DIAGNOSIS — Z23 NEED FOR VACCINATION: ICD-10-CM

## 2020-09-30 PROCEDURE — 90686 IIV4 VACC NO PRSV 0.5 ML IM: CPT | Performed by: NURSE PRACTITIONER

## 2020-12-20 DIAGNOSIS — Z23 NEED FOR VACCINATION: ICD-10-CM

## 2020-12-31 ENCOUNTER — PATIENT MESSAGE (OUTPATIENT)
Dept: MEDICAL GROUP | Facility: LAB | Age: 45
End: 2020-12-31

## 2020-12-31 DIAGNOSIS — M1A.09X0 IDIOPATHIC CHRONIC GOUT OF MULTIPLE SITES WITHOUT TOPHUS: ICD-10-CM

## 2020-12-31 RX ORDER — INDOMETHACIN 50 MG/1
50 CAPSULE ORAL 3 TIMES DAILY
Qty: 90 CAP | Refills: 0 | Status: SHIPPED | OUTPATIENT
Start: 2020-12-31 | End: 2022-01-10

## 2020-12-31 NOTE — TELEPHONE ENCOUNTER
From: Philippe Hillman  To: Neha Rivera M.D.  Sent: 12/31/2020 2:56 PM PST  Subject: Prescription Question    Hi Dr. Rivera,    Happy New Year!     I'm out of Indocin and out of town, I think my exacerbation is coming as I ate bad during the holidays. Will you be able to write me a prescription for Indocin 50 mg po 3x a day? Here's the [facility] I'm close to:    [facility]  [address] [place] [id number]    Thank you!  Philippe

## 2020-12-31 NOTE — PATIENT COMMUNICATION
Received request via: Patient    Was the patient seen in the last year in this department? Yes  LOV: 8/2020    Does the patient have an active prescription (recently filled or refills available) for medication(s) requested? No

## 2021-01-17 ENCOUNTER — IMMUNIZATION (OUTPATIENT)
Dept: FAMILY PLANNING/WOMEN'S HEALTH CLINIC | Facility: IMMUNIZATION CENTER | Age: 46
End: 2021-01-17
Attending: FAMILY MEDICINE
Payer: COMMERCIAL

## 2021-01-17 DIAGNOSIS — Z23 NEED FOR VACCINATION: ICD-10-CM

## 2021-01-17 DIAGNOSIS — Z23 ENCOUNTER FOR VACCINATION: Primary | ICD-10-CM

## 2021-01-17 PROCEDURE — 0011A MODERNA SARS-COV-2 VACCINE: CPT

## 2021-01-17 PROCEDURE — 91301 MODERNA SARS-COV-2 VACCINE: CPT

## 2021-02-03 ENCOUNTER — APPOINTMENT (OUTPATIENT)
Dept: MEDICAL GROUP | Facility: LAB | Age: 46
End: 2021-02-03
Payer: COMMERCIAL

## 2021-02-04 ENCOUNTER — OFFICE VISIT (OUTPATIENT)
Dept: MEDICAL GROUP | Facility: LAB | Age: 46
End: 2021-02-04
Payer: COMMERCIAL

## 2021-02-04 VITALS
DIASTOLIC BLOOD PRESSURE: 68 MMHG | HEART RATE: 117 BPM | SYSTOLIC BLOOD PRESSURE: 118 MMHG | OXYGEN SATURATION: 91 % | HEIGHT: 67 IN | TEMPERATURE: 96.7 F | BODY MASS INDEX: 22.84 KG/M2 | WEIGHT: 145.5 LBS

## 2021-02-04 DIAGNOSIS — M79.602 PAIN OF LEFT UPPER EXTREMITY: ICD-10-CM

## 2021-02-04 DIAGNOSIS — M79.605 PAIN OF LEFT LOWER EXTREMITY: ICD-10-CM

## 2021-02-04 PROCEDURE — 99214 OFFICE O/P EST MOD 30 MIN: CPT | Performed by: FAMILY MEDICINE

## 2021-02-04 RX ORDER — METHYLPREDNISOLONE 4 MG/1
TABLET ORAL
Qty: 21 TAB | Refills: 0 | Status: SHIPPED
Start: 2021-02-04 | End: 2021-02-25

## 2021-02-04 ASSESSMENT — PATIENT HEALTH QUESTIONNAIRE - PHQ9: CLINICAL INTERPRETATION OF PHQ2 SCORE: 0

## 2021-02-05 NOTE — PROGRESS NOTES
Chief Complaint:   Chief Complaint   Patient presents with   • Foot Pain   • Shoulder Pain     left arm        HPI: Established patient   Philippe Hillman is a 45 y.o. male who presents for evaluation of pain in his left upper arm region and left leg     Pain of left upper extremity  This is a new concern that has been going on for long time as per patient at least a few months now, yet its been getting worse with some changes in the range of motion because of the pain.  Reports that he is having this pain located in his upper left arm region specifically where the deltoid muscle and pain is in the whole arm, making him unable to move his left shoulder backwards, denies any injury or incident related to the onset of pain, denies numbness or tingling tingling sensation or sensory changes.  Patient with a history of gout, reports no shoulder pain or other joint pain at this time.  Continues to take his allopurinol on daily basis 100 mg.     Pain of left lower extremity  Patient said around 2 weeks ago he had a flight from [place] to [place], the whole time of the travel was 4 to 6 hours, he said after coming back he felt pain on the left leg feeling like there is a cramp on his left leg and calf region, pain continued for a whole week, got better but not gone completely.  Patient said he is unable to do his regular walks, he used to be able to walk 3 miles per day and now he is able to walk only 1.5 mile because of the feeling of the pain in his left leg.  Denies chest pain or shortness of breath denies swelling on his lower extremities.          Past medical history, family history, social history and medications reviewed and updated in the record. today  Current medications, problem list and allergies reviewed in [facility] today   Health maintenance topics are reviewed and updated.    Patient Active Problem List    Diagnosis Date Noted   • Laceration of face 08/06/2020   • Rash 08/06/2020   • Allergic dermatitis 07/09/2020   •  Right sided abdominal pain 02/13/2019   • Dry eye 08/09/2018   • Chronic idiopathic gout of multiple sites 04/20/2018   • Mallet deformity of right middle finger 08/03/2017   • Injury of finger of right hand 05/10/2017   • Hyperglycemia 09/19/2016   • Leukopenia 09/19/2016   • Allergic rhinitis due to pollen 09/12/2016   • Visual changes 09/12/2016   • Dry skin 09/12/2016   • High serum low-density lipoprotein (LDL) 09/12/2016     Family History   Problem Relation Age of Onset   • Hypertension Mother    • Hypertension Father    • Heart Disease Paternal Uncle    • Stroke Paternal Uncle      Social History     Socioeconomic History   • Marital status: Single     Spouse name: Not on file   • Number of children: Not on file   • Years of education: Not on file   • Highest education level: Not on file   Occupational History   • Not on file   Social Needs   • Financial resource strain: Not on file   • Food insecurity     Worry: Not on file     Inability: Not on file   • Transportation needs     Medical: Not on file     Non-medical: Not on file   Tobacco Use   • Smoking status: Never Smoker   • Smokeless tobacco: Never Used   Substance and Sexual Activity   • Alcohol use: Yes     Comment: once a month   • Drug use: No   • Sexual activity: Not Currently   Lifestyle   • Physical activity     Days per week: Not on file     Minutes per session: Not on file   • Stress: Not on file   Relationships   • Social connections     Talks on phone: Not on file     Gets together: Not on file     Attends [employer] service: Not on file     Active member of club or organization: Not on file     Attends meetings of clubs or organizations: Not on file     Relationship status: Not on file   • Intimate partner violence     Fear of current or ex partner: Not on file     Emotionally abused: Not on file     Physically abused: Not on file     Forced sexual activity: Not on file   Other Topics Concern   • Not on file   Social History Narrative   • Not  "on file       Current Outpatient Medications   Medication Sig Dispense Refill   • methylPREDNISolone (MEDROL DOSEPAK) 4 MG Tablet Therapy Pack As directed on the packaging label. 21 Tab 0   • indomethacin (INDOCIN) 50 MG Cap Take 1 Cap by mouth 3 times a day. 90 Cap 0   • allopurinol (ZYLOPRIM) 100 MG Tab Take 1 Tab by mouth every day. 30 Tab 6   • triamcinolone acetonide (KENALOG) 0.1 % Ointment Apply to rash BID until clear (Patient not taking: Reported on 2/4/2021) 60 g 1     No current facility-administered medications for this visit.          Review Of Systems  As documented in HPI above  PHYSICAL EXAMINATION:    /68 (BP Location: Right arm, Patient Position: Sitting, BP Cuff Size: Adult)   Pulse (!) 117   Temp 35.9 °C (96.7 °F) (Temporal)   Ht 1.702 m (5' 7\")   Wt 66 kg (145 lb 8.1 oz)   SpO2 91%   BMI 22.79 kg/m²   Gen.: Well-developed, well-nourished, no apparent distress, pleasant and cooperative with the examination  HEENT: Normocephalic/atraumatic,   Neck: No JVD or bruits, no adenopathy  Cor: Regular rate and rhythm without murmur gallop or rub  Lungs: Clear to auscultation with equal breath sounds bilaterally. No wheezes, rhonchi.  Abdomen: Soft nontender without hepatosplenomegaly or masses appreciated, normoactive bowel sounds  Extremities: No cyanosis, clubbing or edema    Left leg exam: Benign exam, no evidence of swelling or calf tenderness, patient reports thin tenderness and discomfort below the calf region    Left upper extremity/shoulder exam: No evidence of deformity, there is limitation in the range of motion with external rotation and extension.    ASSESSMENT/Plan:  1. Pain of left upper extremity   new problem, possibly tendinopathy, will also rule out pathology to the left shoulder, advised to do an x-ray advised to take short dose of prednisone and follow-up with PCP as directed no red flags at this time  DX-SHOULDER 2+ LEFT    DX-HUMERUS 2+ LEFT    methylPREDNISolone (MEDROL " DOSEPAK) 4 MG Tablet Therapy Pack   2. Pain of left lower extremity   new concern, pain in the left leg unresolved after recent traveling.  No red flags, will do an ultrasound to rule out blood clot.  Advised if any shortness of breath or chest pain to report to ER, differential diagnosis includes possibly muscular pain use Medrol Dosepak as directed    US-EXTREMITY VENOUS LOWER UNILAT LEFT    methylPREDNISolone (MEDROL DOSEPAK) 4 MG Tablet Therapy Pack       Please note that this dictation was created using voice recognition software. I have made every reasonable attempt to correct obvious errors but there may be errors of grammar and content that I may have overlooked prior to finalization of this note.

## 2021-02-09 ENCOUNTER — HOSPITAL ENCOUNTER (OUTPATIENT)
Dept: RADIOLOGY | Facility: MEDICAL CENTER | Age: 46
End: 2021-02-09
Attending: FAMILY MEDICINE
Payer: COMMERCIAL

## 2021-02-09 ENCOUNTER — ANTICOAGULATION VISIT (OUTPATIENT)
Dept: VASCULAR LAB | Facility: MEDICAL CENTER | Age: 46
End: 2021-02-09
Attending: INTERNAL MEDICINE
Payer: COMMERCIAL

## 2021-02-09 ENCOUNTER — TELEPHONE (OUTPATIENT)
Dept: VASCULAR LAB | Facility: MEDICAL CENTER | Age: 46
End: 2021-02-09

## 2021-02-09 DIAGNOSIS — I82.4Y9 ACUTE DEEP VEIN THROMBOSIS (DVT) OF PROXIMAL VEIN OF LOWER EXTREMITY, UNSPECIFIED LATERALITY (HCC): ICD-10-CM

## 2021-02-09 DIAGNOSIS — I82.442 ACUTE DEEP VEIN THROMBOSIS (DVT) OF TIBIAL VEIN OF LEFT LOWER EXTREMITY (HCC): ICD-10-CM

## 2021-02-09 DIAGNOSIS — M79.605 PAIN OF LEFT LOWER EXTREMITY: ICD-10-CM

## 2021-02-09 DIAGNOSIS — M79.602 PAIN OF LEFT UPPER EXTREMITY: ICD-10-CM

## 2021-02-09 DIAGNOSIS — I82.402 ACUTE DEEP VEIN THROMBOSIS (DVT) OF LEFT LOWER EXTREMITY, UNSPECIFIED VEIN (HCC): ICD-10-CM

## 2021-02-09 PROBLEM — I82.409 DEEP VEIN THROMBOSIS (HCC): Status: ACTIVE | Noted: 2021-02-09

## 2021-02-09 LAB — INR PPP: 1 (ref 2–3.5)

## 2021-02-09 PROCEDURE — 99213 OFFICE O/P EST LOW 20 MIN: CPT

## 2021-02-09 PROCEDURE — 73060 X-RAY EXAM OF HUMERUS: CPT | Mod: LT

## 2021-02-09 PROCEDURE — 85610 PROTHROMBIN TIME: CPT

## 2021-02-09 PROCEDURE — 73030 X-RAY EXAM OF SHOULDER: CPT | Mod: LT

## 2021-02-09 PROCEDURE — 93971 EXTREMITY STUDY: CPT | Mod: LT

## 2021-02-09 NOTE — Clinical Note
Dr. Bloch,   New DVT.  Nonprovoked other than a long flight.  Started on Xarelto per our DVT protocol.    Thanks  Ricardo

## 2021-02-10 LAB — INR BLD: 1 (ref 0.9–1.2)

## 2021-02-10 NOTE — PROGRESS NOTES
Anticoagulation Summary  As of 2021    INR goal:     TTR:  --   INR used for dosin.00 (2021)   Warfarin maintenance plan:  No maintenance plan   Next INR check:  3/1/2021   Target end date:  2021    Indications    Deep vein thrombosis (HCC) [I82.409]             Anticoagulation Episode Summary     INR check location:  Anticoagulation Clinic    Preferred lab:      Send INR reminders to:      Comments:  Xarelto      Anticoagulation Care Providers     Provider Role Specialty Phone number    [facility]   [phone]    Neha Rivera M.D.  Family Medicine [phone]        Anticoagulation Patient Findings        HPI:     • Philippe Hillman was referred to the [facility] clinic for new DVT.  He was referred to clinic today based on our DVT protocol.  He presented to a [facility] provider on  with a chief complaint of foot pain and shoulder pain.  He recently had a trip and noticed the calf pain.  Ultrasound results found a DVT in the posterior tibial veins.  Based on our protocol, we started him on Xarelto 15 mg twice daily.  3 weeks of samples were given to the patient today in clinic.  We will then get labs and transition him to 20 mg of Xarelto once daily.  He does not have a current cardiologist or hematologist.  No other provoking events identified other than a long plane trip.  No family history of DVTs.  He reports one aunt has a history of a stroke but does not know the details.     Vascular Laboratory   CONCLUSIONS   1.Positive DVT in the Left lower extremity posterior tibial veins.    • Does this pt have a past history of using a AC?-- no  • Does this pt have a past history of blood clots? -- no  • Does this pt have a family history of blood clots or known clotting disorders?-stroke in family member.   • Was there are provoking events leading to this blood clot or stroke?--no    Care Team      Cardiologist: no    Hematologist: no    PCP:  Neha Rivera M.D.  [address] [place]  [address]  [facility] [address]  [phone]    Meds, Vitals, and labs     3 vitals included with today's appt-unless patient declined:  (BP, HR, weight, ht, RR)   There were no vitals filed for this visit.    Lab Results   Component Value Date/Time    ALKPHOSPHAT 54 09/12/2016 11:26 AM    ASTSGOT 26 09/12/2016 11:26 AM    ALTSGPT 31 09/12/2016 11:26 AM    TBILIRUBIN 0.8 09/12/2016 11:26 AM    ALBUMIN 4.7 09/12/2016 11:26 AM    BUN 15 02/06/2019 09:54 AM    CREATININE 1.03 02/06/2019 09:54 AM    RBC 5.07 02/06/2019 09:54 AM    HEMOGLOBIN 15.5 02/06/2019 09:54 AM    HEMATOCRIT 46.4 02/06/2019 09:54 AM    PLATELETCT 274 02/06/2019 09:54 AM    INR 1.00 02/09/2021 12:00 AM        Current Outpatient Medications:   •  rivaroxaban, 20 mg, Oral, PM MEAL  •  methylPREDNISolone, As directed on the packaging label.  •  indomethacin, 50 mg, Oral, TID  •  allopurinol, 100 mg, Oral, DAILY  •  triamcinolone acetonide, Apply to rash BID until clear    Assessment:     • INR  therapeutic.   • DOAC affordable = yes  • Interval Changes in ETOH:   No  • Interval Changes in smoking status:  No  • S/S of bleeding or bruising:  No  • Signs/symptoms  thrombosis since the last appt:  No  • Any upcoming procedures that require stopping a anticoagulant: None  • Interval Changes in medication:  No   • Is pt on antiplatelet therapy: no  • Are there any clinically significant drug interactions?-- indocin     HAS-BLED AND CHADSVAS   Hypertension-Uncontrolled, >160 mmHg systolic- no  Renal disease Dialysis, transplant, Cr >2.26 mg/dL or >200 µmol/L - no  Liver disease Cirrhosis or bilirubin >2x normal with AST/ALT/AP >3x normal- no  Stroke history, prior major bleeding, or predisposition to bleeding- no  Labile INR, Unstable/high INRs, time in therapeutic range <60%- no  Age >65- no  Medication usage predisposing to bleeding Aspirin, clopidogrel, NSAIDs - yes  Alcohol use  ?8 drinks/week- no  HAS-BLED = 1    CHADSVAS=n/a    Plan:     Start with Xarelto  15mg taken 2 times a day for 21 days and then change to 20mg taken once daily. Pt will transition to 20mg dose on     Reduce and/or eliminate indomethacin due to the risk of bleeds.  Discussed increasing allopurinol with provider for gout control.       Follow-up:   • Our protocol suggests we test in 3 weeks.    • Labs to be completed prior to next f/u - CBC, CMP    Additional information discussed with patient:   • Pt educated to contact our clinic with any changes in medications or s/s of bleeding or thrombosis.  • Education was provided today regarding tips to reduce their bleed risk and dietary constraints while on a anticoagulant.  • Pt gave verbal consent  to leave a VM with detailed medical information about this topic.   • Pt education done regarding the medication listed above.  · Lifestyle safety, ie smoking, ETOH, hobby safety, fall safety/prevention  · Procedures for missed doses or suspected missed doses, surgeries/procedures, travel, dental work, any medication changes    National recommendations regarding anticoagulation therapy:   The CHEST guidelines recommends frequent INR monitoring at regular intervals (a few days up to a max of 12 weeks) to ensure patients are on the proper dose of warfarin, and patients are not having any complications from therapy.  INRs can dramatically change over a short time period due to diet, medications, and medical conditions.       Clifford Aparicio, PharmD, MS, BCACP, [facility]  Phone [phone] fax [phone]    This note was created using voice recognition software (Dragon). The accuracy of the dictation is limited by the abilities of the software. I have reviewed the note prior to signing, however some errors in grammar and context are still possible. If you have any questions related to this note please do not hesitate to contact our office.         On license of [facility] Pharmacotherapy Program Consent                                              Name    Philippe Hillman    MRN number: [id number]    the following are guidelines for participation in the [facility] Pharmacotherapy Program.     I, ____Philippe Hillman_____, understand and voluntarily agree to participate in the [facility]otherapy Program and to have services provided to me by pharmacists working in collaboration with my provider.    I understand the pharmacist within the [facility] Pharmacotherapy Program may initiate, modify or discontinue my medications, order appropriate testing and appointments, perform exams, monitor treatment, and make clinical evaluations and decisions pursuant to a collaborative practice agreement with my provider.  I understand the pharmacist within the [facility] Pharmacotherapy Program is not a physician, osteopathic physician, advanced practice registered nurse or physician assistant and may not diagnose.  I will take all my medications as instructed and not change the way I take it without first talking to my provider or a pharmacist within the [facility] Pharmacotherapy Program.  I understand that if I am late to my appointment I may not be able to be seen by a pharmacist at that time and will have to reschedule my appointment.  During appointment with pharmacist I understand that pharmacist has the right not to answer questions or perform services outside the pharmacist’s scope of practice.  By signing below, I provide informed consent for the pharmacist to provide these services and for my participation in the [facility] Pharmacotherapy Program.      Philippe Hillman           [id number]          02/09/21  Patient Name                   MRN number  Date     ____Obtained verbal consent from pt, No signature due to COVID concerns___   Patient Signature

## 2021-02-10 NOTE — TELEPHONE ENCOUNTER
Initial anticoag note and most recent fam med note reviewed.    Patient started on anticoag with xarelto for below knee dvt. No obvious provoking risk factors. Had plane flight - but short.     Will continue with 3 mo of oral anticoag and then check back with pcp to determine whether or not to consider extended therapy, perform further w/u, and/or have vasc med consultation.     Will defer any indicated age appropriate screening for occult malignancy to pcp.    Michael Bloch, MD  Anticoagulation Clinic    Cc:  E Smith

## 2021-02-14 ENCOUNTER — IMMUNIZATION (OUTPATIENT)
Dept: FAMILY PLANNING/WOMEN'S HEALTH CLINIC | Facility: IMMUNIZATION CENTER | Age: 46
End: 2021-02-14
Attending: INTERNAL MEDICINE
Payer: COMMERCIAL

## 2021-02-14 DIAGNOSIS — Z23 ENCOUNTER FOR VACCINATION: Primary | ICD-10-CM

## 2021-02-14 PROCEDURE — 0012A MODERNA SARS-COV-2 VACCINE: CPT

## 2021-02-14 PROCEDURE — 91301 MODERNA SARS-COV-2 VACCINE: CPT

## 2021-02-15 PROCEDURE — RXMED WILLOW AMBULATORY MEDICATION CHARGE: Performed by: NURSE PRACTITIONER

## 2021-02-24 ENCOUNTER — PHARMACY VISIT (OUTPATIENT)
Dept: PHARMACY | Facility: MEDICAL CENTER | Age: 46
End: 2021-02-24
Payer: COMMERCIAL

## 2021-02-25 ENCOUNTER — OFFICE VISIT (OUTPATIENT)
Dept: MEDICAL GROUP | Facility: LAB | Age: 46
End: 2021-02-25
Payer: COMMERCIAL

## 2021-02-25 VITALS
HEART RATE: 94 BPM | TEMPERATURE: 98.1 F | WEIGHT: 147 LBS | SYSTOLIC BLOOD PRESSURE: 110 MMHG | OXYGEN SATURATION: 97 % | BODY MASS INDEX: 23.07 KG/M2 | RESPIRATION RATE: 16 BRPM | DIASTOLIC BLOOD PRESSURE: 70 MMHG | HEIGHT: 67 IN

## 2021-02-25 DIAGNOSIS — M25.512 ACUTE PAIN OF LEFT SHOULDER: ICD-10-CM

## 2021-02-25 DIAGNOSIS — M1A.09X0 IDIOPATHIC CHRONIC GOUT OF MULTIPLE SITES WITHOUT TOPHUS: ICD-10-CM

## 2021-02-25 DIAGNOSIS — M75.42 IMPINGEMENT SYNDROME OF LEFT SHOULDER: ICD-10-CM

## 2021-02-25 DIAGNOSIS — Z82.69 FAMILY HISTORY OF DERMATOMYOSITIS: ICD-10-CM

## 2021-02-25 DIAGNOSIS — I82.442 ACUTE DEEP VEIN THROMBOSIS (DVT) OF TIBIAL VEIN OF LEFT LOWER EXTREMITY (HCC): ICD-10-CM

## 2021-02-25 PROBLEM — S69.91XA INJURY OF FINGER OF RIGHT HAND: Status: RESOLVED | Noted: 2017-05-10 | Resolved: 2021-02-25

## 2021-02-25 PROBLEM — R21 RASH: Status: RESOLVED | Noted: 2020-08-06 | Resolved: 2021-02-25

## 2021-02-25 PROBLEM — R10.9 RIGHT SIDED ABDOMINAL PAIN: Status: RESOLVED | Noted: 2019-02-13 | Resolved: 2021-02-25

## 2021-02-25 PROBLEM — S01.81XA LACERATION OF FACE: Status: RESOLVED | Noted: 2020-08-06 | Resolved: 2021-02-25

## 2021-02-25 PROCEDURE — 99214 OFFICE O/P EST MOD 30 MIN: CPT | Performed by: FAMILY MEDICINE

## 2021-02-25 RX ORDER — COLCHICINE 0.6 MG/1
TABLET ORAL
Qty: 20 TABLET | Refills: 1 | Status: SHIPPED | OUTPATIENT
Start: 2021-02-25 | End: 2022-09-28 | Stop reason: SDUPTHER

## 2021-02-26 DIAGNOSIS — M1A.09X0 IDIOPATHIC CHRONIC GOUT OF MULTIPLE SITES WITHOUT TOPHUS: ICD-10-CM

## 2021-02-26 NOTE — ASSESSMENT & PLAN NOTE
Been going on for 2 months with decreased ROM in the left shoulder.  He denies any trauma to the area.  He is wondering if physical therapy would be helpful.  Patient is also concerned because his father had dermatomyositis.

## 2021-02-26 NOTE — PATIENT INSTRUCTIONS
Rotator Cuff Tendinitis    Rotator cuff tendinitis is inflammation of the tough, cord-like bands that connect muscle to bone (tendons) in the rotator cuff. The rotator cuff includes all of the muscles and tendons that connect the arm to the shoulder. The rotator cuff holds the head of the upper arm bone (humerus) in the cup (fossa) of the shoulder blade (scapula).  This condition can lead to a long-lasting (chronic) tear. The tear may be partial or complete.  What are the causes?  This condition is usually caused by overusing the rotator cuff.  What increases the risk?  This condition is more likely to develop in athletes and workers who frequently use their shoulder or reach over their heads. This can include activities such as:  · Tennis.  · Baseball or softball.  · Swimming.  · Construction work.  · Painting.  What are the signs or symptoms?  Symptoms of this condition include:  · Pain spreading (radiating) from the shoulder to the upper arm.  · Swelling and tenderness in front of the shoulder.  · Pain when reaching, pulling, or lifting the arm above the head.  · Pain when lowering the arm from above the head.  · Minor pain in the shoulder when resting.  · Increased pain in the shoulder at night.  · Difficulty placing the arm behind the back.  How is this diagnosed?  This condition is diagnosed with a medical history and physical exam. Tests may also be done, including:  · X-rays.  · MRI.  · Ultrasounds.  · CT or MR arthrogram. During this test, a contrast material is injected and then images are taken.  How is this treated?  Treatment for this condition depends on the severity of the condition. In less severe cases, treatment may include:  · Rest. This may be done with a sling that holds the shoulder still (immobilization). Your health care provider may also recommend avoiding activities that involve lifting your arm over your head.  · Icing the shoulder.  · Anti-inflammatory medicines, such as aspirin or  ibuprofen.  In more severe cases, treatment may include:  · Physical therapy.  · Steroid injections.  · Surgery.  Follow these instructions at home:  If you have a sling:  · Wear the sling as told by your health care provider. Remove it only as told by your health care provider.  · Loosen the sling if your fingers tingle, become numb, or turn cold and blue.  · Keep the sling clean.  · If the sling is not waterproof, do not let it get wet. Remove it, if allowed, or cover it with a watertight covering when you take a bath or shower.  Managing pain, stiffness, and swelling  · If directed, put ice on the injured area.  ? If you have a removable sling, remove it as told by your health care provider.  ? Put ice in a plastic bag.  ? Place a towel between your skin and the bag.  ? Leave the ice on for 20 minutes, 2-3 times a day.  · Move your fingers often to avoid stiffness and to lessen swelling.  · Raise (elevate) the injured area above the level of your heart while you are lying down.  · Find a comfortable sleeping position or sleep on a recliner, if available.  Driving  · Do not drive or use heavy machinery while taking prescription pain medicine.  · Ask your health care provider when it is safe to drive if you have a sling on your arm.  Activity  · Rest your shoulder as told by your health care provider.  · Return to your normal activities as told by your health care provider. Ask your health care provider what activities are safe for you.  · Do any exercises or stretches as told by your health care provider.  · If you do repetitive overhead tasks, take small breaks in between and include stretching exercises as told by your health care provider.  General instructions  · Do not use any products that contain nicotine or tobacco, such as cigarettes and e-cigarettes. These can delay healing. If you need help quitting, ask your health care provider.  · Take over-the-counter and prescription medicines only as told by your  health care provider.  · Keep all follow-up visits as told by your health care provider. This is important.  Contact a health care provider if:  · Your pain gets worse.  · You have new pain in your arm, hands, or fingers.  · Your pain is not relieved with medicine or does not get better after 6 weeks of treatment.  · You have cracking sensations when moving your shoulder in certain directions.  · You hear a snapping sound after using your shoulder, followed by severe pain and weakness.  Get help right away if:  · Your arm, hand, or fingers are numb or tingling.  · Your arm, hand, or fingers are swollen or painful or they turn white or blue.  Summary  · Rotator cuff tendinitis is inflammation of the tough, cord-like bands that connect muscle to bone (tendons) in the rotator cuff.  · This condition is usually caused by overusing the rotator cuff, which includes all of the muscles and tendons that connect the arm to the shoulder.  · This condition is more likely to develop in athletes and workers who frequently use their shoulder or reach over their heads.  · Treatment generally includes rest, anti-inflammatory medicines, and icing. In some cases, physical therapy and steroid injections may be needed. In severe cases, surgery may be needed.  This information is not intended to replace advice given to you by your health care provider. Make sure you discuss any questions you have with your health care provider.  Document Released: 03/09/2005 Document Revised: 04/10/2020 Document Reviewed: 12/04/2017  [employer]vier Patient Education © 2020 [employer]    Deep Vein Thrombosis    Deep vein thrombosis (DVT) is a condition in which a blood clot forms in a deep vein, such as a lower leg, thigh, or arm vein. A clot is blood that has thickened into a gel or solid. This condition is dangerous. It can lead to serious and even life-threatening complications if the clot travels to the lungs and causes a blockage (pulmonary embolism).  It can also damage veins in the leg. This can result in leg pain, swelling, discoloration, and sores (post-thrombotic syndrome).  What are the causes?  This condition may be caused by:  · A slowdown of blood flow.  · Damage to a vein.  · A condition that causes blood to clot more easily, such as an inherited clotting disorder.  What increases the risk?  The following factors may make you more likely to develop this condition:  · Being overweight.  · Being older, especially over age 60.  · Sitting or lying down for more than four hours.  · Being in the hospital.  · Lack of physical activity (sedentary lifestyle).  · Pregnancy, being in childbirth, or having recently given birth.  · Taking medicines that contain estrogen, such as medicines to prevent pregnancy.  · Smoking.  · A history of any of the following:  ? Blood clots or a blood clotting disease.  ? Peripheral vascular disease.  ? Inflammatory bowel disease.  ? Cancer.  ? Heart disease.  ? Genetic conditions that affect how your blood clots, such as Factor V Leiden mutation.  ? Neurological diseases that affect your legs (leg paresis).  ? A recent injury, such as a car accident.  ? Major or lengthy surgery.  ? A central line placed inside a large vein.  What are the signs or symptoms?  Symptoms of this condition include:  · Swelling, pain, or tenderness in an arm or leg.  · Warmth, redness, or discoloration in an arm or leg.  If the clot is in your leg, symptoms may be more noticeable or worse when you stand or walk. Some people may not develop any symptoms.  How is this diagnosed?  This condition is diagnosed with:  · A medical history and physical exam.  · Tests, such as:  ? Blood tests. These are done to check how well your blood clots.  ? Ultrasound. This is done to check for clots.  ? Venogram. For this test, contrast dye is injected into a vein and X-rays are taken to check for any clots.  How is this treated?  Treatment for this condition depends  on:  · The cause of your DVT.  · Your risk for bleeding or developing more clots.  · Any other medical conditions that you have.  Treatment may include:  · Taking a blood thinner (anticoagulant). This type of medicine prevents clots from forming. It may be taken by mouth, injected under the skin, or injected through an IV (catheter).  · Injecting clot-dissolving medicines into the affected vein (catheter-directed thrombolysis).  · Having surgery. Surgery may be done to:  ? Remove the clot.  ? Place a filter in a large vein to catch blood clots before they reach the lungs.  Some treatments may be continued for up to six months.  Follow these instructions at home:  If you are taking blood thinners:  · Take the medicine exactly as told by your health care provider. Some blood thinners need to be taken at the same time every day. Do not skip a dose.  · Talk with your health care provider before you take any medicines that contain aspirin or NSAIDs. These medicines increase your risk for dangerous bleeding.  · Ask your health care provider about foods and drugs that could change the way the medicine works (may interact). Avoid those things if your health care provider tells you to do so.  · Blood thinners can cause easy bruising and may make it difficult to stop bleeding. Because of this:  ? Be very careful when using knives, scissors, or other sharp objects.  ? Use an electric razor instead of a blade.  ? Avoid activities that could cause injury or bruising, and follow instructions about how to prevent falls.  · Wear a medical alert bracelet or carry a card that lists what medicines you take.  General instructions  · Take over-the-counter and prescription medicines only as told by your health care provider.  · Return to your normal activities as told by your health care provider. Ask your health care provider what activities are safe for you.  · Wear compression stockings if recommended by your health care  provider.  · Keep all follow-up visits as told by your health care provider. This is important.  How is this prevented?  To lower your risk of developing this condition again:  · For 30 or more minutes every day, do an activity that:  ? Involves moving your arms and legs.  ? Increases your heart rate.  · When traveling for longer than four hours:  ? Exercise your arms and legs every hour.  ? Drink plenty of water.  ? Avoid drinking alcohol.  · Avoid sitting or lying for a long time without moving your legs.  · If you have surgery or you are hospitalized, ask about ways to prevent blood clots. These may include taking frequent walks or using anticoagulants.  · Stay at a healthy weight.  · If you are a woman who is older than age 35, avoid unnecessary use of medicines that contain estrogen, such as some birth control pills.  · Do not use any products that contain nicotine or tobacco, such as cigarettes and e-cigarettes. This is especially important if you take estrogen medicines. If you need help quitting, ask your health care provider.  Contact a health care provider if:  · You miss a dose of your blood thinner.  · Your menstrual period is heavier than usual.  · You have unusual bruising.  Get help right away if:  · You have:  ? New or increased pain, swelling, or redness in an arm or leg.  ? Numbness or tingling in an arm or leg.  ? Shortness of breath.  ? Chest pain.  ? A rapid or irregular heartbeat.  ? A severe headache or confusion.  ? A cut that will not stop bleeding.  · There is blood in your vomit, stool, or urine.  · You have a serious fall or accident, or you hit your head.  · You feel light-headed or dizzy.  · You cough up blood.  These symptoms may represent a serious problem that is an emergency. Do not wait to see if the symptoms will go away. Get medical help right away. Call your local emergency services (911 in the [place] Do not drive yourself to the hospital.  Summary  · Deep vein thrombosis (DVT)  is a condition in which a blood clot forms in a deep vein, such as a lower leg, thigh, or arm vein.  · Symptoms can include swelling, warmth, pain, and redness in your leg or arm.  · This condition may be treated with a blood thinner (anticoagulant medicine), medicine that is injected to dissolve blood clots,compression stockings, or surgery.  · If you are prescribed blood thinners, take them exactly as told.  This information is not intended to replace advice given to you by your health care provider. Make sure you discuss any questions you have with your health care provider.  Document Released: 12/18/2006 Document Revised: 11/30/2018 Document Reviewed: 05/18/2018  [employer] Patient Education © 2020 [employer]    Secondary Shoulder Impingement Syndrome Rehab  Ask your health care provider which exercises are safe for you. Do exercises exactly as told by your health care provider and adjust them as directed. It is normal to feel mild stretching, pulling, tightness, or discomfort as you do these exercises. Stop right away if you feel sudden pain or your pain gets worse. Do not begin these exercises until told by your health care provider.  Stretching and range-of-motion exercise  This exercise warms up your muscles and joints and improves the movement and flexibility of your neck and shoulder. This exercise also helps to relieve pain and stiffness.  Cervical side bend    1. Using good posture, sit on a stable chair, or stand up.  2. Without moving your shoulders, slowly tilt your left / right ear toward your left / right shoulder until you feel a stretch in your neck (cervical) muscles on the other side. You should be looking straight ahead.  3. Hold for __________ seconds.  4. Slowly return to the starting position.  5. Repeat the stretch on your left / right side.  Repeat __________ times. Complete this exercise __________ times a day.  Strengthening exercises  These exercises build strength and endurance in  your shoulder. Endurance is the ability to use your muscles for a long time, even after they get tired.  Scapular protraction, supine    1. Lie on your back on a firm surface (supine position). Hold a __________ weight in your left / right hand.  2. Raise your left / right arm straight into the air so your hand is directly above your shoulder joint.  3. Push the weight into the air so your shoulder (scapula) lifts off the surface that you are lying on. The scapula will push up or forward (protraction). Do not move your head, neck, or back.  4. Hold for __________ seconds.  5. Slowly return to the starting position. Let your muscles relax completely before you repeat this exercise.  Repeat __________ times. Complete this exercise __________ times a day.  Scapular retraction    1. Sit in a stable chair without armrests, or stand up.  2. Secure an exercise band to a stable object in front of you so the band is at shoulder height.  3. Hold one end of the exercise band in each hand. Your palms should face down.  4. Squeeze your shoulder blades (scapulae) together and move your elbows slightly behind you (retraction). Do not shrug your shoulders upward while you do this.  5. Hold for __________ seconds.  6. Slowly return to the starting position.  Repeat __________ times. Complete this exercise __________ times a day.  Shoulder extension with scapular retraction    1. Sit in a stable chair without armrests, or stand up.  2. Secure an exercise band to a stable object in front of you so the band is above shoulder height.  3. Hold one end of the exercise band in each hand.  4. Straighten your elbows and lift your hands up to shoulder height.  5. Squeeze your shoulder blades together (scapular retraction) and pull your hands down to the sides of your thighs (shoulder extension). Stop when your hands are straight down by your sides. Do not let your hands go behind your body.  6. Hold for __________ seconds.  7. Slowly return  to the starting position.  Repeat __________ times. Complete this exercise __________ times a day.  Shoulder abduction  1. Sit in a stable chair without armrests, or stand up.  2. If directed, hold a __________ weight in your left / right hand.  3. Start with your arms straight down. Turn your left / right hand so your palm faces in, toward your body.  4. Slowly lift your left / right hand out to your side (abduction). Do not lift your hand above shoulder height.  ? Keep your arms straight.  ? Avoid shrugging your shoulder while you do this movement. Keep your shoulder blade tucked down toward the middle of your back.  5. Hold for __________ seconds.  6. Slowly lower your arm, and return to the starting position.  Repeat __________ times. Complete this exercise __________ times a day.  This information is not intended to replace advice given to you by your health care provider. Make sure you discuss any questions you have with your health care provider.  Document Released: 12/18/2006 Document Revised: 04/10/2020 Document Reviewed: 01/23/2020  [employer] Patient Education © 2020 [employer]

## 2021-03-01 ENCOUNTER — ANTICOAGULATION VISIT (OUTPATIENT)
Dept: MEDICAL GROUP | Facility: MEDICAL CENTER | Age: 46
End: 2021-03-01
Payer: COMMERCIAL

## 2021-03-01 ENCOUNTER — HOSPITAL ENCOUNTER (OUTPATIENT)
Dept: LAB | Facility: MEDICAL CENTER | Age: 46
End: 2021-03-01
Attending: FAMILY MEDICINE
Payer: COMMERCIAL

## 2021-03-01 DIAGNOSIS — I82.442 ACUTE DEEP VEIN THROMBOSIS (DVT) OF TIBIAL VEIN OF LEFT LOWER EXTREMITY (HCC): ICD-10-CM

## 2021-03-01 DIAGNOSIS — M25.512 ACUTE PAIN OF LEFT SHOULDER: ICD-10-CM

## 2021-03-01 DIAGNOSIS — M1A.09X0 IDIOPATHIC CHRONIC GOUT OF MULTIPLE SITES WITHOUT TOPHUS: ICD-10-CM

## 2021-03-01 LAB
BASOPHILS # BLD AUTO: 1 % (ref 0–1.8)
BASOPHILS # BLD: 0.07 K/UL (ref 0–0.12)
EOSINOPHIL # BLD AUTO: 0.08 K/UL (ref 0–0.51)
EOSINOPHIL NFR BLD: 1.1 % (ref 0–6.9)
ERYTHROCYTE [DISTWIDTH] IN BLOOD BY AUTOMATED COUNT: 38.8 FL (ref 35.9–50)
ERYTHROCYTE [SEDIMENTATION RATE] IN BLOOD BY WESTERGREN METHOD: 7 MM/HOUR (ref 0–15)
HCT VFR BLD AUTO: 43.9 % (ref 42–52)
HGB BLD-MCNC: 14.4 G/DL (ref 14–18)
IMM GRANULOCYTES # BLD AUTO: 0.01 K/UL (ref 0–0.11)
IMM GRANULOCYTES NFR BLD AUTO: 0.1 % (ref 0–0.9)
LYMPHOCYTES # BLD AUTO: 2.68 K/UL (ref 1–4.8)
LYMPHOCYTES NFR BLD: 36.5 % (ref 22–41)
MCH RBC QN AUTO: 30 PG (ref 27–33)
MCHC RBC AUTO-ENTMCNC: 32.8 G/DL (ref 33.7–35.3)
MCV RBC AUTO: 91.5 FL (ref 81.4–97.8)
MONOCYTES # BLD AUTO: 0.67 K/UL (ref 0–0.85)
MONOCYTES NFR BLD AUTO: 9.1 % (ref 0–13.4)
NEUTROPHILS # BLD AUTO: 3.84 K/UL (ref 1.82–7.42)
NEUTROPHILS NFR BLD: 52.2 % (ref 44–72)
NRBC # BLD AUTO: 0 K/UL
NRBC BLD-RTO: 0 /100 WBC
PLATELET # BLD AUTO: 291 K/UL (ref 164–446)
PMV BLD AUTO: 8.8 FL (ref 9–12.9)
RBC # BLD AUTO: 4.8 M/UL (ref 4.7–6.1)
URATE SERPL-MCNC: 8.3 MG/DL (ref 2.5–8.3)
WBC # BLD AUTO: 7.4 K/UL (ref 4.8–10.8)

## 2021-03-01 PROCEDURE — 99211 OFF/OP EST MAY X REQ PHY/QHP: CPT | Performed by: INTERNAL MEDICINE

## 2021-03-01 PROCEDURE — 85025 COMPLETE CBC W/AUTO DIFF WBC: CPT

## 2021-03-01 PROCEDURE — 85652 RBC SED RATE AUTOMATED: CPT

## 2021-03-01 PROCEDURE — 36415 COLL VENOUS BLD VENIPUNCTURE: CPT

## 2021-03-01 PROCEDURE — 84550 ASSAY OF BLOOD/URIC ACID: CPT

## 2021-03-01 RX ORDER — ALLOPURINOL 100 MG/1
100 TABLET ORAL DAILY
Qty: 30 TABLET | Refills: 6 | Status: SHIPPED
Start: 2021-03-01 | End: 2021-03-10 | Stop reason: DRUGHIGH

## 2021-03-01 NOTE — TELEPHONE ENCOUNTER
Received request via: Pharmacy    Was the patient seen in the last year in this department? Yes  2/25/2021  Does the patient have an active prescription (recently filled or refills available) for medication(s) requested? No

## 2021-03-02 NOTE — PROGRESS NOTES
Target end date:5/9/21  Indication: LLE DVT  Drug: Xarelto 15 mg BID - switching to Xarelto 20 mg qpm on 3/3  CHADsVASC = n/a  HAS-BLED = 0    Health Status Since Last Assessment   Patient denies any new relevant medical problems, ED visits or hospitalizations   Patient denies any embolic events (stroke/tia/systemic embolism)    Adherence with DOAC Therapy   Pt has not missed any doses in the average week    Bleeding Risk Assessment     Denies Epistaxis   Pt states last week, he saw blood when he wipes after stooling, but it has resolved   Pt denies any excessive or unusual hematomas.  Pt denies any hematemesis.  Pt denies any concerning daily headache or sub dural hematoma symptoms.     Pt denies any hematuria   Latest Hemoglobin pending   Platelets: pending   ETOH overuse denies     Creatinine Clearance/Renal Function     Latest ClCr pending    Hepatic function   Latest LFTs pending   Pt denies any history of liver dysfunction      Drug Interactions   Medications reconciled    ASA/other antiplatelets none   NSAID none   Other drug interactions none    Verified no anticonvulsant or azole therapy, education provided for future use.     Examination   Blood Pressure declined d/t COVID-19 concerns.  There were no vitals filed for this visit.   Symptomatic hypotension none   Significant gait impairment/imbalance/high risk for falls? none    Final Assessment and Recommendations:   Patient appears stable from the anticoagulation standpoint.     Benefits of continued DOAC therapy outweigh risks for this patient   Recommend pt continue with current DOAC therapy - Transition to Xarelto 20 mg daily on 3/3    Other Actions: pt to get cmp/ cbc hemogram drawn prior to next visit    Follow up:   Will follow up with patient 6 weeks via phone as pt will be out of town for 1 month starting on 4/3.      Amanda Garber, TimD

## 2021-03-03 NOTE — ASSESSMENT & PLAN NOTE
Patient developed a DVT following a flight from [place].  Ultrasound on 2/9/2021 showed.Positive DVT in the Left lower extremity posterior tibial veins.  He was started on Xarelto.  He has been doing well well since then.  No thrombophilia work-up was done because this was felt to be provoked.  He is still having some tightness in that leg.

## 2021-03-03 NOTE — PROGRESS NOTES
Subjective:     Chief Complaint   Patient presents with   • Pain     left shoulder pain and limited ROM, requesting PT   • Medication Management     allopurinol   • Follow-Up     right leg tightness       Philippe Hillman is a 45 y.o. male here today for evaluation and management of:    Acute pain of left shoulder  Been going on for 2 months with decreased ROM in the left shoulder.  He denies any trauma to the area.  He is wondering if physical therapy would be helpful.  Patient is also concerned because his father had dermatomyositis.    Acute deep vein thrombosis (DVT) of tibial vein of left lower extremity (HCC)  Patient developed a DVT following a flight from [place].  Ultrasound on 2/9/2021 showed.Positive DVT in the Left lower extremity posterior tibial veins.  He was started on Xarelto.  He has been doing well well since then.  No thrombophilia work-up was done because this was felt to be provoked.  He is still having some tightness in that leg.    Chronic idiopathic gout of multiple sites  Patient only gets there is if he does not watch his diet.  He is unable to take the Indocin while he is on the Xarelto.       No Known Allergies    Current medicines (including changes today)  Current Outpatient Medications   Medication Sig Dispense Refill   • colchicine (COLCRYS) 0.6 MG Tab 2 tabs at onset and one po 8 hours later 20 tablet 1   • allopurinol (ZYLOPRIM) 100 MG Tab Take 1 tablet by mouth every day. 30 tablet 6   • rivaroxaban (XARELTO) 20 MG Tab tablet Take 1 Tab by mouth with dinner. 30 Tab 3   • indomethacin (INDOCIN) 50 MG Cap Take 1 Cap by mouth 3 times a day. 90 Cap 0     No current facility-administered medications for this visit.       He  has a past medical history of Measles (1983).    Patient Active Problem List    Diagnosis Date Noted   • Acute deep vein thrombosis (DVT) of tibial vein of left lower extremity (HCC) 02/09/2021     Priority: High   • Acute pain of left shoulder 02/25/2021   • Family  "history of dermatomyositis 02/25/2021   • Allergic dermatitis 07/09/2020   • Dry eye 08/09/2018   • Chronic idiopathic gout of multiple sites 04/20/2018   • Mallet deformity of right middle finger 08/03/2017   • Hyperglycemia 09/19/2016   • Allergic rhinitis due to pollen 09/12/2016   • High serum low-density lipoprotein (LDL) 09/12/2016       ROS   No fever or chills.  No nausea or vomiting.  No chest pain or palpitations.  No cough or SOB.  No pain with urination or hematuria.  No black or bloody stools.       Objective:     /70 (BP Location: Right arm, Patient Position: Sitting, BP Cuff Size: Adult)   Pulse 94   Temp 36.7 °C (98.1 °F) (Temporal)   Resp 16   Ht 1.702 m (5' 7\")   Wt 66.7 kg (147 lb)   SpO2 97%  Body mass index is 23.02 kg/m².   Physical Exam:  Well developed, well nourished.  Alert, oriented in no acute distress.  Eye contact is good, speech goal directed, affect calm  Eyes: conjunctiva non-injected, sclera non-icteric.  Neck Supple.  No adenopathy or masses in the neck or supraclavicular regions. No thyromegaly  Lungs: clear to auscultation bilaterally with good excursion. No wheezes or rhonchi  CV: regular rate and rhythm. No murmur  Ext: no edema, color normal, vascularity normal, temperature normal  Shoulder/arm exam: No deformity, erythema, edema or ecchymosis. Tenderness to palpation anteriorly. ROM intact but painful in elevation. Hawkin's test positive. Neer's test negative. . Empty can test negative. Drop arm test negative.. 5/5 strength bilaterally.             Assessment and Plan:   The following treatment plan was discussed    1. Acute pain of left shoulder  Refer to physical therapy for further evaluation and treatment.  We will check a sed rate given the family history of dermatomyositis  - REFERRAL TO PHYSICAL THERAPY  - Sed Rate; Future    2. Acute deep vein thrombosis (DVT) of tibial vein of left lower extremity (HCC)  Continue Xarelto for 3 months and then reevaluate.  " Patient may be returning home for an extended period but will do it on his return.  We discussed safety when flying including compression stocks and making sure he gets up and moves around.  Once he is off the medication we will do a thrombophilic work-up  - US-EXTREMITY VENOUS LOWER UNILAT LEFT; Future    3. Idiopathic chronic gout of multiple sites without tophus  Check labs.  Will give him a prescription for colchicine to use it he has a outbreak.  Adjust allopurinol dose as needed  - CBC WITH DIFFERENTIAL; Future  - URIC ACID; Future  - colchicine (COLCRYS) 0.6 MG Tab; 2 tabs at onset and one po 8 hours later  Dispense: 20 tablet; Refill: 1  - Sed Rate; Future    4. Impingement syndrome of left shoulder  Refer to physical therapy for further evaluation and treatment  - REFERRAL TO PHYSICAL THERAPY    5. Family history of dermatomyositis      Any change or worsening of signs or symptoms, patient encouraged to follow-up or report to the emergency room for further evaluation. Patient understands and agrees.    Followup: Return in about 2 months (around 5/4/2021).

## 2021-03-03 NOTE — ASSESSMENT & PLAN NOTE
Patient only gets there is if he does not watch his diet.  He is unable to take the Indocin while he is on the Xarelto.

## 2021-03-10 ENCOUNTER — PATIENT MESSAGE (OUTPATIENT)
Dept: MEDICAL GROUP | Facility: LAB | Age: 46
End: 2021-03-10

## 2021-03-10 DIAGNOSIS — M1A.09X0 IDIOPATHIC CHRONIC GOUT OF MULTIPLE SITES WITHOUT TOPHUS: ICD-10-CM

## 2021-03-10 RX ORDER — ALLOPURINOL 300 MG/1
300 TABLET ORAL DAILY
Qty: 90 TABLET | Refills: 3 | Status: SHIPPED | OUTPATIENT
Start: 2021-03-10 | End: 2021-07-20

## 2021-03-18 ENCOUNTER — OFFICE VISIT (OUTPATIENT)
Dept: MEDICAL GROUP | Facility: LAB | Age: 46
End: 2021-03-18
Payer: COMMERCIAL

## 2021-03-18 ENCOUNTER — TELEPHONE (OUTPATIENT)
Dept: MEDICAL GROUP | Facility: LAB | Age: 46
End: 2021-03-18

## 2021-03-18 VITALS
WEIGHT: 145 LBS | HEART RATE: 97 BPM | TEMPERATURE: 97 F | OXYGEN SATURATION: 100 % | SYSTOLIC BLOOD PRESSURE: 120 MMHG | BODY MASS INDEX: 22.76 KG/M2 | DIASTOLIC BLOOD PRESSURE: 80 MMHG | HEIGHT: 67 IN

## 2021-03-18 DIAGNOSIS — M79.672 LEFT FOOT PAIN: ICD-10-CM

## 2021-03-18 DIAGNOSIS — M10.9 ACUTE GOUT OF LEFT FOOT, UNSPECIFIED CAUSE: ICD-10-CM

## 2021-03-18 PROCEDURE — 99214 OFFICE O/P EST MOD 30 MIN: CPT | Performed by: FAMILY MEDICINE

## 2021-03-18 RX ORDER — METHYLPREDNISOLONE 4 MG/1
TABLET ORAL
Qty: 21 TABLET | Refills: 0 | Status: SHIPPED
Start: 2021-03-18 | End: 2021-07-20

## 2021-03-18 NOTE — TELEPHONE ENCOUNTER
Received phone call from patient regarding left foot pain that started yesterday. Pain is along outer part of foot and heel. Patient does not believe it is a gout exacerbation, he did take extra dose of colchicine with no relief. He attempted to exercise but could not due to the pain. Patient reports the pain is increased upon standing, improved when sitting. Patient denies area is swollen or red, does not recall recent injury to the area. Appointment made with provider in office.

## 2021-03-19 ENCOUNTER — HOSPITAL ENCOUNTER (OUTPATIENT)
Dept: RADIOLOGY | Facility: MEDICAL CENTER | Age: 46
End: 2021-03-19
Attending: FAMILY MEDICINE
Payer: COMMERCIAL

## 2021-03-19 DIAGNOSIS — M79.672 LEFT FOOT PAIN: ICD-10-CM

## 2021-03-19 PROCEDURE — 73630 X-RAY EXAM OF FOOT: CPT | Mod: LT

## 2021-03-19 NOTE — PROGRESS NOTES
Chief Complaint:   Chief Complaint   Patient presents with   • Foot Problem     foot pain       HPI:established patient   Philippe Hillman is a 45 y.o. female who presents for evaluation of left foot pain for the past 2 days     Left foot pain  Reports pain in the left foot started 2 days ago, without reported or incident of trauma related to the onset of pain.  Patient with history of gout, reports mild swelling and noticing inability to exercise as regular because of the pain and discomfort, started taking his colchicine loading dose and then maintenance for the past 2 days, and he feels improvement on the pain today he denies pain.,  Patient recently started around 1 month ago taking 300 mg of allopurinol    Acute gout of left foot, unspecified cause  History of gout, reports left foot pain as described above.        Past medical history, family history, social history and medications reviewed and updated in the record. today  Current medications, problem list and allergies reviewed in [facility] today  Health maintenance topics are reviewed and updated.    Patient Active Problem List    Diagnosis Date Noted   • Acute deep vein thrombosis (DVT) of tibial vein of left lower extremity (HCC) 02/09/2021   • Acute pain of left shoulder 02/25/2021   • Family history of dermatomyositis 02/25/2021   • Allergic dermatitis 07/09/2020   • Dry eye 08/09/2018   • Chronic idiopathic gout of multiple sites 04/20/2018   • Mallet deformity of right middle finger 08/03/2017   • Hyperglycemia 09/19/2016   • Allergic rhinitis due to pollen 09/12/2016   • High serum low-density lipoprotein (LDL) 09/12/2016     Family History   Problem Relation Age of Onset   • Hypertension Mother    • Hypertension Father    • Heart Disease Paternal Uncle    • Stroke Paternal Uncle      Social History     Socioeconomic History   • Marital status: Single     Spouse name: Not on file   • Number of children: Not on file   • Years of education: Not on file   •  Highest education level: Not on file   Occupational History   • Not on file   Tobacco Use   • Smoking status: Never Smoker   • Smokeless tobacco: Never Used   Substance and Sexual Activity   • Alcohol use: Yes     Comment: once a month   • Drug use: No   • Sexual activity: Not Currently   Other Topics Concern   • Not on file   Social History Narrative   • Not on file     Social Determinants of Health     Financial Resource Strain:    • Difficulty of Paying Living Expenses:    Food Insecurity:    • Worried About Running Out of Food in the Last Year:    • Ran Out of Food in the Last Year:    Transportation Needs:    • Lack of Transportation (Medical):    • Lack of Transportation (Non-Medical):    Physical Activity:    • Days of Exercise per Week:    • Minutes of Exercise per Session:    Stress:    • Feeling of Stress :    Social Connections:    • Frequency of Communication with Friends and Family:    • Frequency of Social Gatherings with Friends and Family:    • Attends [employer] Services:    • Active Member of Clubs or Organizations:    • Attends Club or Organization Meetings:    • Marital Status:    Intimate Partner Violence:    • Fear of Current or Ex-Partner:    • Emotionally Abused:    • Physically Abused:    • Sexually Abused:      Current Outpatient Medications   Medication Sig Dispense Refill   • methylPREDNISolone (MEDROL DOSEPAK) 4 MG Tablet Therapy Pack As directed on the packaging label. 21 tablet 0   • allopurinol (ZYLOPRIM) 300 MG Tab Take 1 tablet by mouth every day. 90 tablet 3   • colchicine (COLCRYS) 0.6 MG Tab 2 tabs at onset and one po 8 hours later 20 tablet 1   • rivaroxaban (XARELTO) 20 MG Tab tablet Take 1 Tab by mouth with dinner. 30 Tab 3   • indomethacin (INDOCIN) 50 MG Cap Take 1 Cap by mouth 3 times a day. (Patient not taking: Reported on 3/18/2021) 90 Cap 0     No current facility-administered medications for this visit.           Review Of Systems  As documented in HPI above  PHYSICAL  "EXAMINATION:    /80 (BP Location: Left arm, Patient Position: Sitting, BP Cuff Size: Adult)   Pulse 97   Temp 36.1 °C (97 °F)   Ht 1.702 m (5' 7\")   Wt 65.8 kg (145 lb)   SpO2 100%   BMI 22.71 kg/m²   Gen.: Well-developed, well-nourished, no apparent distress, pleasant and cooperative with the examination  HEENT: Normocephalic/atraumatic, sinuses nontender with palpation, TMs clear, nares patent with pink mucosa and clear rhinorrhea, oropharynx clear  Neck: No JVD or bruits, no adenopathy  Cor: Regular rate and rhythm without murmur gallop or rub  Lungs: Clear to auscultation with equal breath sounds bilaterally. No wheezes, rhonchi.  Abdomen: Soft nontender without hepatosplenomegaly or masses appreciated, normoactive bowel sounds  Extremities: No cyanosis, clubbing or edema  Left foot exam: There is mild swelling on the lateral edge of his foot without evidence of erythema or tenderness.      ASSESSMENT/Plan:    1. Left foot pain   new problem, most likely acute gout exacerbation, will also rule out stress fracture, advised to do an x-ray, continue with colchicine.  If not improved patient to start Medrol Dosepak.      Follow-up with PCP as directed.  DX-FOOT-COMPLETE 3+ LEFT    Acute exacerbation of gout, advised to use Medrol Dosepak if colchicine did not resolve his pain completely.  Check kidney function test.  Continue with allopurinol after complete resolution of the acute exacerbation.    methylPREDNISolone (MEDROL DOSEPAK) 4 MG Tablet Therapy Pack   2. Acute gout of left foot, unspecified cause  Comp Metabolic Panel     Please note that this dictation was created using voice recognition software. I have worked with consultants from the vendor as well as technical experts from [employer] to optimize the interface. I have made every reasonable attempt to correct obvious errors, but I expect that there are errors of grammar and possibly content that I did not discover before finalizing the " note.

## 2021-03-19 NOTE — PATIENT INSTRUCTIONS
Patient is recommended to take all meds as directed if any., and to follow up regularly ,   . RTC as needed

## 2021-03-26 ENCOUNTER — PHYSICAL THERAPY (OUTPATIENT)
Dept: PHYSICAL THERAPY | Facility: MEDICAL CENTER | Age: 46
End: 2021-03-26
Attending: FAMILY MEDICINE
Payer: COMMERCIAL

## 2021-03-26 DIAGNOSIS — M25.512 ACUTE PAIN OF LEFT SHOULDER: ICD-10-CM

## 2021-03-26 DIAGNOSIS — M75.42 IMPINGEMENT SYNDROME OF LEFT SHOULDER: ICD-10-CM

## 2021-03-26 PROCEDURE — 97161 PT EVAL LOW COMPLEX 20 MIN: CPT

## 2021-03-26 ASSESSMENT — ENCOUNTER SYMPTOMS
PAIN SCALE AT LOWEST: 0
PAIN SCALE AT HIGHEST: 7
PAIN SCALE: 6
QUALITY: ACHING
PAIN TIMING: INTERMITTENT

## 2021-03-26 NOTE — OP THERAPY EVALUATION
Outpatient Physical Therapy  INITIAL EVALUATION    [facility]  [address]  [place] [address]  Phone:  [phone]  Fax:  [phone]    Date of Evaluation: 2021    Patient: Philippe Hillman  YOB: 1975  MRN: [id number]     Referring Provider: Neha Rivera M.D.  [address] [facility] [address]  [place] [address]   Referring Diagnosis Acute pain of left shoulder [M25.512];Impingement syndrome of left shoulder [M75.42]     Time Calculation    Start time: 1030  Stop time: 1130 Time Calculation (min): 60 minutes             Chief Complaint: No chief complaint on file.    Visit Diagnoses     ICD-10-CM   1. Acute pain of left shoulder  M25.512   2. Impingement syndrome of left shoulder  M75.42       No data found  Subjective   History of Present Illness:     Date of onset:  2021    History of chief complaint:  Pt woke up in january with some left shoulder pain. No fall or trauma. He has occasional numbness in his hand.     Pain:     Current pain ratin    At best pain ratin    At worst pain ratin    Location:  Pain lateral arm into the deltoid.    Quality:  Aching    Pain timing:  Intermittent    Aggravating factors:  Left side lying, movement overhead or HBB, push ups, tennis .     Relieving factors:  Avoid aggravating positions, massage, heat helps some.     Activity Tolerance:     Current activity tolerance / Recreational activities:  Unable to exercise or play tennis. Pt walks 1-2 miles 4x/week.     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     Social Support:     Lives in:  Condominium    Lives with:  Alone    Hand Dominance:     Hand dominance:  Right    Diagnostic Tests:     X-ray: normal          Past Medical History:   Diagnosis Date   • Measles     in [place]     Past Surgical History:   Procedure Laterality Date   • EYE SURGERY      LASIX       Precautions:       Objective   Observation  and functional movement:  Pt has good sitting posture.     Range of motion and strength:    Strength is within functional limits.    AROM left shoulder 110 degrees, abduction 92 degrees, HBB wrist to L5, ER 34 degrees.     MMT : 5/5 and nonpainful.     Sensation and reflexes:     Sensation is intact.      Reflexes are normal and symmetrical.    Palpation and joint mobility:     No tenderness to palpation noted.    Pt has decreased  GH accessory movement inferiorly and anteriorly. Pt has capsular end feel with PROM.     Special tests:      Negative neural tension tests.             Therapeutic Exercises (CPT 71770):     1. Pt started on A/PROM exercises: see below      Therapeutic Exercise Summary: Access Code: [email or web address]  URL: [email or web address]  Date: 03/26/2021  Prepared by: Candice Madden    Exercises  •Supine Shoulder Flexion AAROM with Hands Clasped - 2 x daily - 7 x weekly - 1 sets - 10 reps  •Supine Shoulder External Rotation AAROM with Dowel - 2 x daily - 7 x weekly - 1 sets - 10 reps  •Seated Shoulder External Rotation AAROM with Dowel - 2 x daily - 7 x weekly - 1 sets - 10 reps  •Standing Shoulder Extension with Dowel - 1 x daily - 7 x weekly - 10 reps - 3 sets  •Hip Hinge Rock Back - 1 x daily - 7 x weekly - 10 reps - 3 sets          Time-based treatments/modalities:           Assessment, Response and Plan:   Impairments: abnormal ADL function, abnormal or restricted ROM, impaired functional mobility and lacks appropriate home exercise program    Assessment details:  Philippe is a pleasant 45 year old male with recent onset of left shoulder pain with adhesive capsulitis. Pt would benefit from skilled PT to improve mobility and function. Pt will come for a few sessions then he's out of town for one month.   Prognosis: good    Goals:   Short Term Goals:   Pt will be compliant with daily HEP.   Pt will have 20% increased mobiity in 2 weeks.   Short term goal time span:  2-4 weeks      Long Term Goals:     Pt will be able to sleep on his side.   Pt will be able to play tennis.   Pt will be able to tolerate overhead activities.   Quickdash score will improve 50%.  Long term goal time span:  6-8 weeks    Plan:   Therapy options:  Physical therapy treatment to continue  Planned therapy interventions:  E Stim Unattended (CPT 71301), Manual Therapy (CPT 97189) and Therapeutic Exercise (CPT 80508)  Frequency:  2x week  Duration in weeks:  8  Discussed with:  Patient      Functional Assessment Used  Quickdash General Total Score: 34.09     Referring provider co-signature:  I have reviewed this plan of care and my co-signature certifies the need for services.    Certification Period: 03/26/2021 to  5/26/2021    Physician Signature: ________________________________ Date: ______________

## 2021-03-29 NOTE — OP THERAPY DAILY TREATMENT
Outpatient Physical Therapy  DAILY TREATMENT     [facility]  [address]  [place] [address]  Phone:  [phone]  Fax:  [phone]    Date: 2021    Patient: Philippe Hillman  YOB: 1975  MRN: [id number]     Time Calculation    Start time: 0846  Stop time: 928 Time Calculation (min): 42 minutes         Chief Complaint: Shoulder Problem    Visit #: 2    SUBJECTIVE:  I feel like my shoulder has improved. If I do too much, when I go to bed at night it feels weird and keeps me up. Last night I had a good night of sleep. I'm not flexible enough to grab my seat belt. I have to use my R hand. It's also hard to scratch my back.     I'm going out of town -First week of May.       OBJECTIVE:  Current objective measures:        AROM: (sitting)  L GH flexion: 105 deg (stiffness)  L GH abduction: 75 deg (stiffness)  L GH BTB standing: L5 ((stiffness)  L GH extension standin deg  *UT hiking and lumbar side bending to attain increased GH ROM      Post treatment:   L GH flexion: 110 deg (stiffness)  L GH abduction: 83 deg (stiffness)    Therapeutic Exercises (CPT 76995):     1. Pt started on A/PROM exercises: see below, hep as below, reviewed    2. Pt education, re: towel roll at axilla for GH ER with hep    3. Pt education, re: disease process for adhesive capsulitis    19. 2021      Therapeutic Exercise Summary: Access Code: [email or web address]  URL: [email or web address]  Date: 2021  Prepared by: Candice Madden    Exercises  •Supine Shoulder Flexion AAROM with Hands Clasped - 2 x daily - 7 x weekly - 1 sets - 10 reps  •Supine Shoulder External Rotation AAROM with Dowel - 2 x daily - 7 x weekly - 1 sets - 10 reps (added towel to axilla)  •Standing Shoulder Extension with Dowel - 1 x daily - 7 x weekly - 10 reps - 3 sets        Therapeutic Treatments and Modalities:     1. Manual Therapy (CPT 76174), see below, x 11 min    2. E Stim  Unattended (CPT 94600), IFC and mhp to L shoulder x 15 min    3. Manual Therapy (CPT 96244), see below    Therapeutic Treatment and Modalities Summary: Gr III inf and post mobs to L GH with pt hooklying followed by STM to subscap; pt hooklying with bolster for comfort     Time-based treatments/modalities:    Physical Therapy Timed Treatment Charges  Manual therapy minutes (CPT 96562): 11 minutes  Therapeutic exercise minutes (CPT 74883): 16 minutes      Pain rating (1-10) before treatment:  0/10 at rest   Pain rating (1-10) after treatment:  0    ASSESSMENT:   Response to treatment:   Good carry over of initial hep; Pt reporting stiffness improved with at home. No sig increase in ROM since initial evaluation. Few min spent today discussing disease process and management. Good tolerance to manual with report of decreased stiffness and slight improvement in range within session.      PLAN/RECOMMENDATIONS:   Plan for treatment: therapy treatment to continue next visit.  Planned interventions for next visit: continue with current treatment.  Progress hep as tolerated; continue gentle manual at shoulder

## 2021-03-30 ENCOUNTER — PHYSICAL THERAPY (OUTPATIENT)
Dept: PHYSICAL THERAPY | Facility: MEDICAL CENTER | Age: 46
End: 2021-03-30
Attending: FAMILY MEDICINE
Payer: COMMERCIAL

## 2021-03-30 DIAGNOSIS — M25.512 ACUTE PAIN OF LEFT SHOULDER: ICD-10-CM

## 2021-03-30 DIAGNOSIS — M75.42 IMPINGEMENT SYNDROME OF LEFT SHOULDER: ICD-10-CM

## 2021-03-30 PROCEDURE — 97140 MANUAL THERAPY 1/> REGIONS: CPT

## 2021-03-30 PROCEDURE — 97110 THERAPEUTIC EXERCISES: CPT

## 2021-03-30 PROCEDURE — 97014 ELECTRIC STIMULATION THERAPY: CPT

## 2021-03-31 PROCEDURE — RXMED WILLOW AMBULATORY MEDICATION CHARGE: Performed by: NURSE PRACTITIONER

## 2021-04-02 ENCOUNTER — PHYSICAL THERAPY (OUTPATIENT)
Dept: PHYSICAL THERAPY | Facility: MEDICAL CENTER | Age: 46
End: 2021-04-02
Attending: FAMILY MEDICINE
Payer: COMMERCIAL

## 2021-04-02 ENCOUNTER — PHARMACY VISIT (OUTPATIENT)
Dept: PHARMACY | Facility: MEDICAL CENTER | Age: 46
End: 2021-04-02
Payer: COMMERCIAL

## 2021-04-02 DIAGNOSIS — M25.512 ACUTE PAIN OF LEFT SHOULDER: ICD-10-CM

## 2021-04-02 PROCEDURE — 97110 THERAPEUTIC EXERCISES: CPT

## 2021-04-02 PROCEDURE — 97014 ELECTRIC STIMULATION THERAPY: CPT

## 2021-04-02 PROCEDURE — 97140 MANUAL THERAPY 1/> REGIONS: CPT

## 2021-04-09 ENCOUNTER — OFFICE VISIT (OUTPATIENT)
Dept: VASCULAR LAB | Facility: MEDICAL CENTER | Age: 46
End: 2021-04-09
Attending: FAMILY MEDICINE
Payer: COMMERCIAL

## 2021-04-09 DIAGNOSIS — Z86.718 HISTORY OF DVT (DEEP VEIN THROMBOSIS): ICD-10-CM

## 2021-04-09 DIAGNOSIS — Z79.01 CHRONIC ANTICOAGULATION: ICD-10-CM

## 2021-04-09 DIAGNOSIS — I82.4Y9 ACUTE DEEP VEIN THROMBOSIS (DVT) OF PROXIMAL VEIN OF LOWER EXTREMITY, UNSPECIFIED LATERALITY (HCC): ICD-10-CM

## 2021-04-09 PROCEDURE — 99203 OFFICE O/P NEW LOW 30 MIN: CPT | Performed by: FAMILY MEDICINE

## 2021-04-09 ASSESSMENT — ENCOUNTER SYMPTOMS
PALPITATIONS: 0
WEAKNESS: 0
SHORTNESS OF BREATH: 0
HEMOPTYSIS: 0
TREMORS: 0
HEADACHES: 0
COUGH: 0
FOCAL WEAKNESS: 0
CLAUDICATION: 0
BRUISES/BLEEDS EASILY: 0
DIZZINESS: 0
VOMITING: 0
FEVER: 0
DIARRHEA: 0
SEIZURES: 0
ABDOMINAL PAIN: 0
CHILLS: 0
NAUSEA: 0
WHEEZING: 0
MYALGIAS: 0

## 2021-04-09 NOTE — PROGRESS NOTES
"INITIAL VASCULAR ANTICOAGULATION VISIT  Subjective:   Philippe Hillman is a 45 y.o. male who presents today 04/09/21 for   No chief complaint on file.    Initially referred by Vanessa Millan M.D. for length of therapy (LOT) determination and management of anticoagulation in context of acute venothromboembolic disease    HPI:    VTE disease / Anticoagulation:   Current symptoms:  Denies current SOB, CP, leg swelling, edema, leg pain, cyanosis of digits, redness of extremities.  Occasional \"tired\" feeling in the leg based upon level of activity.   Current antithrombotic agent:  xarelto 20mg daily   Complications: none, tolerating well, no bleeding or bruising   Date of initiation of anticoagulation: 2/9/21  Adherence: reports complete, no missed doses      Pertinent VTE pmhx:   Date of Diagnosis: 2/9/21  Type of Venous thromboembolic disease (VTE): acute LLE VTE   Type of imaging: duplex  Preceding/presenting symptoms:  Acute onset of cramping and reduced walking and treadmill after travel 1/16, had Moderna COVID vaccine (1/17).  Had worsened over couple weeks.   Antithrombotic therapy at time of VTE event: no  VTE tx course: Xarelto 15mg BID x 21d, then 20mg daily to date    Any personal VTE hx? No  Any family VTE hx? No    UNPROVOKED VS PROVOKED:   Recent surgery ? No  Recent trauma ? No  Smoker?  reports that he has never smoked. He has never used smokeless tobacco.    Extended travel? Yes, Details: 3h flight, 1/16/21, was staying active   Other periods of immobility? No  Other risk factors for VTE disease:  no  MEN:  Colorectal CA screening:no              Prostate CA screening: no   Hx of Testosterone therapy: no  Hypercoaguability work-up completed?  no (see assessment for details)    Past Medical History:   Diagnosis Date   • Measles 1983    in [place]     Past Surgical History:   Procedure Laterality Date   • EYE SURGERY      LASIX       Current Outpatient Medications:   •  methylPREDNISolone, As directed on the " packaging label.  •  allopurinol, 300 mg, Oral, DAILY  •  colchicine, 2 tabs at onset and one po 8 hours later  •  rivaroxaban, 20 mg, Oral, PM MEAL  •  indomethacin, 50 mg, Oral, TID (Patient not taking: Reported on 3/18/2021)     No Known Allergies     Family History   Problem Relation Age of Onset   • Hypertension Mother    • Hypertension Father    • Heart Disease Paternal Uncle    • Stroke Paternal Uncle      Social History     Tobacco Use   • Smoking status: Never Smoker   • Smokeless tobacco: Never Used   Substance Use Topics   • Alcohol use: Yes     Comment: once a month   • Drug use: No     DIET AND EXERCISE:  Weight Change:stable   BMI Readings from Last 5 Encounters:   03/18/21 22.71 kg/m²   02/25/21 23.02 kg/m²   02/04/21 22.79 kg/m²   09/18/20 21.77 kg/m²   08/06/20 21.77 kg/m²     Diet: common adult  Exercise: no regular exercise program     Review of Systems   Constitutional: Negative for chills, fever and malaise/fatigue.   Respiratory: Negative for cough, hemoptysis, shortness of breath and wheezing.    Cardiovascular: Negative for chest pain, palpitations, claudication and leg swelling.   Gastrointestinal: Negative for abdominal pain, diarrhea, nausea and vomiting.   Musculoskeletal: Negative for joint pain and myalgias.   Skin: Negative for itching and rash.   Neurological: Negative for dizziness, tremors, focal weakness, seizures, weakness and headaches.   Endo/Heme/Allergies: Does not bruise/bleed easily.        Objective:   There were no vitals filed for this visit.   BP Readings from Last 5 Encounters:   03/18/21 120/80   02/25/21 110/70   02/04/21 118/68   08/06/20 110/80   07/31/20 122/89      There is no height or weight on file to calculate BMI.  Physical Exam  Constitutional:       Appearance: Normal appearance.   HENT:      Head: Normocephalic.   Eyes:      Extraocular Movements: Extraocular movements intact.      Conjunctiva/sclera: Conjunctivae normal.   Neurological:      General: No  focal deficit present.      Mental Status: He is alert and oriented to person, place, and time.   Psychiatric:         Mood and Affect: Mood normal.         Behavior: Behavior normal.         Thought Content: Thought content normal.         Lab Results   Component Value Date    CHOLSTRLTOT 164 09/09/2020    LDL 97 09/09/2020    HDL 44 09/09/2020    TRIGLYCERIDE 117 09/09/2020      Lab Results   Component Value Date    INR 1.00 02/09/2021         Lab Results   Component Value Date    GLUCOSE 100 (H) 09/09/2020        Lab Results   Component Value Date    WBC 7.4 03/01/2021    RBC 4.80 03/01/2021    HEMOGLOBIN 14.4 03/01/2021    HEMATOCRIT 43.9 03/01/2021    MCV 91.5 03/01/2021    MCH 30.0 03/01/2021    MCHC 32.8 (L) 03/01/2021    MPV 8.8 (L) 03/01/2021        VASCULAR IMAGING:     Last EKG:   No results found for this or any previous visit.     LLE venous 2/9/21  Positive DVT in the Left lower extremity posterior tibial veins.   Left lower extremity -   Evidence of occlusive acute deep venous thrombosis in the posterior tibial    veins from proximal calf to distal calf.    Complete color filling and compressibility with normal venous flow dynamics    including spontaneous flow, response to augmentation maneuvers, and    respiratory phasicity in all other remaining visualized vessels.     Medical Decision Making:  Today's Assessment / Status / Plan:   No diagnosis found.  PATIENT TYPE: Primary Prevention    Etiology of Established CVD if Present:   1) acute LLE DVT     ANTITHROMBOTIC THERAPY:  Anti-Platelet/Anti-Coagulant Tx recommended: yes  Indication: acute distal LLE DVT   Date of initiation: 2/9/21   HAS-BLED bleeding risk calc ([email or web address]): 0 pts, 0.9%, low risk   Thrombophilia/hypercoag evaluation:  not recommended  Factors to consider for indefinite OAC: None   Last CBC, BMP: 3/2021   Expected duration: reviewed standard of care as per ACCP AT10 guidelines (2016) is 3-6 months minimum on full dose OAC.  After  "review of risks/benefits/alternatives, through shared decision-making, we will continue high-dose xarelto through next visit May 2021 and transition to ASA.  - consider low risk DVT, so extended therapy unlikely beneficial  Antithrombotic therapy plan:  - continue xarelto 20mg daily through next appt in May 2021, then ASA 162mg for 6-12mo  - stressed strict adherence to tx and avoid early termination due to increased risk for recurrent VTE  - check CBC, BMP (CMP if any underlying liver disease), and monitor CrCl as needed Q6mo while on DOAC  - counseled on signs and symptoms of acute VTE that require seeking prompt attention in the ED to include shortness of breath, chest pain, pain with deep inhalation, acute leg swelling and/or pain in calf or leg   - elevate legs as much as possible, use compression stockings/socks if directed by your provider  - Avoid hormonal therapies including estrogen or testosterone-containing meds, or raloxifene or tamoxifene (commonly used for osteoporosis)  - Avoid sedentary periods  - continue complete avoidance of tobacco products  - if having any invasive procedure,please make sure the doctor knows of your history of blood clots and current anticoagulation status  - avoid Aspirin and anti-inflammatories (eg. Advil, ibuprofen, Aleve, naproxen, etc) while anticoagulated   - avoid skiing or other dangerous activities to reduce risk of head injury and brain bleeds  - recommended to see your PCP to discuss if you need age-appropriate cancer screenings as a small % of blood clots may be caused by an underlying malignancy  - if any bleeding lasting 30min without stopping, please seek care with your PCP, urgent care, or ED  - reversal agents for most blood thinners are now available and used if you have major bleeding    LIPID MANAGEMENT:   Qualifies for Statin Therapy Based on 2018 ACC/AHA Guidelines: no  10-yr ASCVD risk score: <5% \"low risk\"  Major ASCVD events: None  High-risk conditions: " None   Risk-enhancers: N/A  Currently on Statin: No  Treatment goals: LDL-C <100 (consider non-HDL-C <130, apoB <90)  At goal? N/A  Plan:   - reinforced ongoing TLC measures as noted   - defer lab surveillance to PCP   Meds: none at this time      BLOOD PRESSURE MANAGEMENT  ACC/AHA (2017) goal <130/80  Home BP at goal: yes  Office BP at goal:  yes   Plan:   - continue healthy diet, activity, weight mgmt   Monitoring:   - routine clinic-based BP measurements at least once annually   Medications: no meds indicated at this time     GLYCEMIC STATUS:  Normal    LIFESTYLE RECOMMENDATIONS:     Smoking:  reports that he has never smoked. He has never used smokeless tobacco.   - continued complete avoidance of all tobacco products     Physical Activity: continue healthy activity to improve CV fitness, see care instructions for additional details     Weight Management and Nutrition: Dietary plan was discussed with patient at this visit including DASH, low sodium and/or as outlined in care instructions     OTHER:    1) gout, stable  Uric acid 8.3  - continue allopurinol, defer mgmt to PCP     Instructed to follow-up with PCP for remainder of adult medical needs: yes  We will partner with other providers in the management of established vascular disease and cardiometabolic risk factors.    Studies to Be Obtained: none    Labs to Be Obtained: as noted above     Follow up in: 1 month with BARBARA Lopez M.D.  [facility]   [facility]   [phone]

## 2021-04-19 ENCOUNTER — APPOINTMENT (OUTPATIENT)
Dept: MEDICAL GROUP | Facility: MEDICAL CENTER | Age: 46
End: 2021-04-19
Payer: COMMERCIAL

## 2021-05-06 DIAGNOSIS — I82.402 ACUTE DEEP VEIN THROMBOSIS (DVT) OF LEFT LOWER EXTREMITY, UNSPECIFIED VEIN (HCC): ICD-10-CM

## 2021-05-06 PROCEDURE — RXMED WILLOW AMBULATORY MEDICATION CHARGE: Performed by: FAMILY MEDICINE

## 2021-05-08 ENCOUNTER — PHARMACY VISIT (OUTPATIENT)
Dept: PHARMACY | Facility: MEDICAL CENTER | Age: 46
End: 2021-05-08
Payer: COMMERCIAL

## 2021-05-14 ENCOUNTER — OFFICE VISIT (OUTPATIENT)
Dept: VASCULAR LAB | Facility: MEDICAL CENTER | Age: 46
End: 2021-05-14
Attending: NURSE PRACTITIONER
Payer: COMMERCIAL

## 2021-05-14 VITALS
DIASTOLIC BLOOD PRESSURE: 67 MMHG | HEART RATE: 103 BPM | WEIGHT: 147 LBS | BODY MASS INDEX: 23.07 KG/M2 | HEIGHT: 67 IN | SYSTOLIC BLOOD PRESSURE: 100 MMHG

## 2021-05-14 DIAGNOSIS — Z86.718 HISTORY OF DVT (DEEP VEIN THROMBOSIS): ICD-10-CM

## 2021-05-14 DIAGNOSIS — Z79.01 CHRONIC ANTICOAGULATION: ICD-10-CM

## 2021-05-14 PROCEDURE — 99212 OFFICE O/P EST SF 10 MIN: CPT

## 2021-05-14 PROCEDURE — 99214 OFFICE O/P EST MOD 30 MIN: CPT | Performed by: NURSE PRACTITIONER

## 2021-05-14 ASSESSMENT — ENCOUNTER SYMPTOMS
CLAUDICATION: 0
WEAKNESS: 0
SEIZURES: 0
PALPITATIONS: 0
SHORTNESS OF BREATH: 0
BRUISES/BLEEDS EASILY: 0
CHILLS: 0
VOMITING: 0
COUGH: 0
WHEEZING: 0
HEMOPTYSIS: 0
FEVER: 0
NAUSEA: 0
DIZZINESS: 0
FOCAL WEAKNESS: 0
HEADACHES: 0
ABDOMINAL PAIN: 0
MYALGIAS: 0
TREMORS: 0
DIARRHEA: 0

## 2021-05-14 NOTE — PROGRESS NOTES
"Follow Up VASCULAR ANTICOAGULATION VISIT  Subjective:   Philippe Hillman is a 45 y.o. male who presents today 05/14/2021 for   Chief Complaint   Patient presents with   • Follow-Up     Initially referred by Vanessa Millan M.D. for length of therapy (LOT) determination and management of anticoagulation in context of acute venothromboembolic disease    HPI:    VTE disease / Anticoagulation:   Current symptoms:  Denies current SOB, CP, leg swelling, edema, leg pain, cyanosis of digits, redness of extremities.  Occasional \"tired\" feeling in the leg based upon level of activity.   Current antithrombotic agent:  xarelto 20mg daily   Complications: none, tolerating well, no bleeding or bruising   Date of initiation of anticoagulation: 2/9/21  Adherence: reports complete, no missed doses      Pertinent VTE pmhx:   Date of Diagnosis: 2/9/21  Type of Venous thromboembolic disease (VTE): acute LLE VTE   Type of imaging: duplex  Preceding/presenting symptoms:  Acute onset of cramping and reduced walking and treadmill after travel 1/16, had Moderna COVID vaccine (1/17).  Had worsened over couple weeks.   Antithrombotic therapy at time of VTE event: no  VTE tx course: Xarelto 15mg BID x 21d, then 20mg daily to date    Any personal VTE hx? No  Any family VTE hx? No    UNPROVOKED VS PROVOKED:   Recent surgery ? No  Recent trauma ? No  Smoker?  reports that he has never smoked. He has never used smokeless tobacco.    Extended travel? Yes, Details: 3h flight, 1/16/21, was staying active   Other periods of immobility? No  Other risk factors for VTE disease:  no  MEN:  Colorectal CA screening:no              Prostate CA screening: no   Hx of Testosterone therapy: no  Hypercoaguability work-up completed?  no (see assessment for details)      Current Outpatient Medications:   •  rivaroxaban, 20 mg, Oral, PM MEAL  •  methylPREDNISolone, As directed on the packaging label.  •  allopurinol, 300 mg, Oral, DAILY  •  colchicine, 2 tabs at onset " "and one po 8 hours later  •  indomethacin, 50 mg, Oral, TID (Patient not taking: Reported on 3/18/2021)     No Known Allergies     Social History     Tobacco Use   • Smoking status: Never Smoker   • Smokeless tobacco: Never Used   Substance Use Topics   • Alcohol use: Yes     Comment: once a month   • Drug use: No     DIET AND EXERCISE:  Weight Change:stable   BMI Readings from Last 5 Encounters:   05/14/21 23.02 kg/m²   03/18/21 22.71 kg/m²   02/25/21 23.02 kg/m²   02/04/21 22.79 kg/m²   09/18/20 21.77 kg/m²     Diet: common adult  Exercise: no regular exercise program     Review of Systems   Constitutional: Negative for chills, fever and malaise/fatigue.   Respiratory: Negative for cough, hemoptysis, shortness of breath and wheezing.    Cardiovascular: Negative for chest pain, palpitations, claudication and leg swelling.   Gastrointestinal: Negative for abdominal pain, diarrhea, nausea and vomiting.   Musculoskeletal: Negative for joint pain and myalgias.   Skin: Negative for itching and rash.   Neurological: Negative for dizziness, tremors, focal weakness, seizures, weakness and headaches.   Endo/Heme/Allergies: Does not bruise/bleed easily.      Objective:     Vitals:    05/14/21 1406   BP: 100/67   BP Location: Left arm   Patient Position: Sitting   BP Cuff Size: Adult   Pulse: (!) 103   Weight: 66.7 kg (147 lb)   Height: 1.702 m (5' 7\")      BP Readings from Last 5 Encounters:   05/14/21 100/67   03/18/21 120/80   02/25/21 110/70   02/04/21 118/68   08/06/20 110/80      Body mass index is 23.02 kg/m².  Physical Exam  Constitutional:       Appearance: Normal appearance.   HENT:      Head: Normocephalic.   Eyes:      Extraocular Movements: Extraocular movements intact.      Conjunctiva/sclera: Conjunctivae normal.   Neurological:      General: No focal deficit present.      Mental Status: He is alert and oriented to person, place, and time.   Psychiatric:         Mood and Affect: Mood normal.         Behavior: " Behavior normal.         Thought Content: Thought content normal.       Lab Results   Component Value Date    CHOLSTRLTOT 164 09/09/2020    LDL 97 09/09/2020    HDL 44 09/09/2020    TRIGLYCERIDE 117 09/09/2020      Lab Results   Component Value Date    INR 1.00 02/09/2021         Lab Results   Component Value Date    GLUCOSE 100 (H) 09/09/2020        Lab Results   Component Value Date    WBC 7.4 03/01/2021    RBC 4.80 03/01/2021    HEMOGLOBIN 14.4 03/01/2021    HEMATOCRIT 43.9 03/01/2021    MCV 91.5 03/01/2021    MCH 30.0 03/01/2021    MCHC 32.8 (L) 03/01/2021    MPV 8.8 (L) 03/01/2021      VASCULAR IMAGING:     Last EKG:   No results found for this or any previous visit.     LLE venous 2/9/21  Positive DVT in the Left lower extremity posterior tibial veins.   Left lower extremity -   Evidence of occlusive acute deep venous thrombosis in the posterior tibial    veins from proximal calf to distal calf.    Complete color filling and compressibility with normal venous flow dynamics    including spontaneous flow, response to augmentation maneuvers, and    respiratory phasicity in all other remaining visualized vessels.     Medical Decision Making:  Today's Assessment / Status / Plan:     1. Chronic anticoagulation     2. History of DVT (deep vein thrombosis)       PATIENT TYPE: Primary Prevention    Etiology of Established CVD if Present:   1) acute LLE DVT     ANTITHROMBOTIC THERAPY:  Anti-Platelet/Anti-Coagulant Tx recommended: yes  Indication: acute distal LLE DVT   Date of initiation: 2/9/21   HAS-BLED bleeding risk calc ([email or web address]): 0 pts, 0.9%, low risk   Thrombophilia/hypercoag evaluation:  not recommended  Factors to consider for indefinite OAC: None   Last CBC, BMP: 3/2021   Expected duration: reviewed standard of care as per ACCP AT10 guidelines (2016) is 3-6 months minimum on full dose OAC.  No obvious provoking factors other than short plan flight.  After review of risks/benefits/alternatives, through  "shared decision-making, we will continue high-dose xarelto through next visit May 2021 and transition to ASA.  - consider low risk DVT, so extended therapy unlikely beneficial  Antithrombotic therapy plan:  - stop xarelto 20mg   - start ASA 162mg for 6-12 months   - stressed strict monitoring for s/s of recurrent VTE, to ER if these occur  - counseled on signs and symptoms of acute VTE that require seeking prompt attention in the ED to include shortness of breath, chest pain, pain with deep inhalation, acute leg swelling and/or pain in calf or leg   - elevate legs as much as possible, use compression stockings/socks if directed by your provider  - avoid hormonal therapies including estrogen or testosterone-containing meds, or raloxifene or tamoxifene (commonly used for osteoporosis)  - avoid sedentary periods  - continue complete avoidance of tobacco products  - if having any invasive procedure,please make sure the doctor knows of your history of blood clots and current anticoagulation status  - avoid skiing or other dangerous activities to reduce risk of head injury and brain bleeds  - recommended to see your PCP to discuss if you need age-appropriate cancer screenings as a small % of blood clots may be caused by an underlying malignancy  - if any bleeding lasting 30min without stopping, please seek care with your PCP, urgent care, or ED  - reversal agents for most blood thinners are now available and used if you have major bleeding    LIPID MANAGEMENT:   Qualifies for Statin Therapy Based on 2018 ACC/AHA Guidelines: no  10-yr ASCVD risk score: <5% \"low risk\"  Major ASCVD events: None  High-risk conditions: None   Risk-enhancers: N/A  Currently on Statin: No  Treatment goals: LDL-C <100 (consider non-HDL-C <130, apoB <90)  At goal? N/A  Plan:   - reinforced ongoing TLC measures as noted   - defer lab surveillance to PCP   Meds: none at this time      BLOOD PRESSURE MANAGEMENT  ACC/AHA (2017) goal <130/80  Home BP " at goal: yes  Office BP at goal:  yes   Plan:   - continue healthy diet, activity, weight mgmt   Monitoring:   - routine clinic-based BP measurements at least once annually   Medications: no meds indicated at this time     GLYCEMIC STATUS:  Normal    LIFESTYLE RECOMMENDATIONS:     Smoking:  reports that he has never smoked. He has never used smokeless tobacco.   - continued complete avoidance of all tobacco products     Physical Activity: continue healthy activity to improve CV fitness, see care instructions for additional details     Weight Management and Nutrition: Dietary plan was discussed with patient at this visit including DASH, low sodium and/or as outlined in care instructions     OTHER:    1) gout, stable  Uric acid 8.3  - continue allopurinol, defer mgmt to PCP     Instructed to follow-up with PCP for remainder of adult medical needs: yes  We will partner with other providers in the management of established vascular disease and cardiometabolic risk factors.    Studies to Be Obtained: none    Labs to Be Obtained: as noted above     Follow up in: as needed; defer health maintenance to PCP      MARITZA Liu     [facility]   [place]   [phone]

## 2021-05-17 ENCOUNTER — HOSPITAL ENCOUNTER (OUTPATIENT)
Dept: RADIOLOGY | Facility: MEDICAL CENTER | Age: 46
End: 2021-05-17
Attending: FAMILY MEDICINE
Payer: COMMERCIAL

## 2021-05-17 ENCOUNTER — PHYSICAL THERAPY (OUTPATIENT)
Dept: PHYSICAL THERAPY | Facility: MEDICAL CENTER | Age: 46
End: 2021-05-17
Attending: FAMILY MEDICINE
Payer: COMMERCIAL

## 2021-05-17 DIAGNOSIS — M75.42 IMPINGEMENT SYNDROME OF LEFT SHOULDER: ICD-10-CM

## 2021-05-17 DIAGNOSIS — I82.442 ACUTE DEEP VEIN THROMBOSIS (DVT) OF TIBIAL VEIN OF LEFT LOWER EXTREMITY (HCC): ICD-10-CM

## 2021-05-17 DIAGNOSIS — M25.512 ACUTE PAIN OF LEFT SHOULDER: ICD-10-CM

## 2021-05-17 PROCEDURE — 97140 MANUAL THERAPY 1/> REGIONS: CPT

## 2021-05-17 PROCEDURE — 93971 EXTREMITY STUDY: CPT | Mod: LT

## 2021-05-17 PROCEDURE — 97110 THERAPEUTIC EXERCISES: CPT

## 2021-05-20 ENCOUNTER — PHYSICAL THERAPY (OUTPATIENT)
Dept: PHYSICAL THERAPY | Facility: MEDICAL CENTER | Age: 46
End: 2021-05-20
Attending: FAMILY MEDICINE
Payer: COMMERCIAL

## 2021-05-20 DIAGNOSIS — M75.42 IMPINGEMENT SYNDROME OF LEFT SHOULDER: ICD-10-CM

## 2021-05-20 DIAGNOSIS — M25.512 ACUTE PAIN OF LEFT SHOULDER: ICD-10-CM

## 2021-05-20 PROCEDURE — 97140 MANUAL THERAPY 1/> REGIONS: CPT

## 2021-05-20 PROCEDURE — 97110 THERAPEUTIC EXERCISES: CPT

## 2021-05-20 PROCEDURE — 97014 ELECTRIC STIMULATION THERAPY: CPT

## 2021-05-20 NOTE — OP THERAPY DAILY TREATMENT
Outpatient Physical Therapy  DAILY TREATMENT     [facility] Outpatient Physical Therapy  [address]  [place] [address]  Phone:  [phone]  Fax:  [phone]    Date: 05/20/2021    Patient: Philippe Hillman  YOB: 1975  MRN: [id number]     Time Calculation    Start time: 1616  Stop time: 1658 Time Calculation (min): 42 minutes         Chief Complaint: shoulder pain  Visit #: 5       SUBJECTIVE:  I was a little sore after the last session and I did a little more of the exercises. I'm doing okay today.      OBJECTIVE  Shoulder ROM today :  80 deg abd (scapular plane)  100 deg flexion    Shoulder ROM flexion 113 on 4/2/21    Shoulder ROM eval:    Strength is within functional limits.    AROM left shoulder 110 degrees, abduction 92 degrees, HBB wrist to L5, ER 34 degrees.          Therapeutic Exercises (CPT 95352):     1. Review of hep    2. GH ER with staff hooklying, 10x, toweling at axilla; VC's for correction of set up    3. GH flexion with staff hooklying , 10x, VC's to keep elbow extended    4. Pulleys, x3 min, GH flexion stretch at end range     13. UPOC: 5/26/2021    Therapeutic Treatments and Modalities:     1. Manual Therapy (CPT 16246), see below    2. E Stim Unattended (CPT 58444), IFC and mhp to L shoulder x 15 min    Therapeutic Treatment and Modalities Summary: PA and inf mobs at  gr III with GH at end range; pt positioned in hooklying     Time-based treatments/modalities:    Physical Therapy Timed Treatment Charges  Manual therapy minutes (CPT 90831): 15 minutes  Therapeutic exercise minutes (CPT 05743): 12 minutes    Goals:   Short Term Goals:   Pt will be compliant with daily HEP. (met)  Pt will have 20% increased mobiity in 2 weeks. (ongoing)  Short term goal time span:  2-4 weeks      Long Term Goals:    Pt will be able to sleep on his side.   Pt will be able to play tennis.   Pt will be able to tolerate overhead activities.   Quickdash score will  improve 50%.  Long term goal time span:  6-8 weeks    Pain pre treatment: 0/10 pain     ASSESSMENT:   Pt reporting compliance to hep at 1x/wk; educated to perform at least twice per day for additional benefit due to min improvements in mobility with reassessment in sessions. Hypombile GHJ inf>post with increasing ranges. Pt unable to perform abduction in frontal plane (limited to scapular plane), may benefit from t/s and scapular mobilization in subsequent sessions in addition to addressing GHJ.      PLAN/RECOMMENDATIONS:   Plan for treatment: therapy treatment to continue next visit.  Planned interventions for next visit: Progress AROM as tolerated; add t/s and scapular mobilization exercises.

## 2021-05-24 ENCOUNTER — APPOINTMENT (OUTPATIENT)
Dept: PHYSICAL THERAPY | Facility: MEDICAL CENTER | Age: 46
End: 2021-05-24
Attending: FAMILY MEDICINE
Payer: COMMERCIAL

## 2021-05-24 NOTE — OP THERAPY DAILY TREATMENT
Outpatient Physical Therapy  DAILY TREATMENT     [facility] Physical Therapy  [address]  [place] [address]  Phone:  [phone]  Fax:  [phone]    Date: 05/24/2021    Patient: Philippe Hillman  YOB: 1975  MRN: [id number]     Time Calculation                   Chief Complaint: shoulder pain  Visit #: 6       SUBJECTIVE:  I was a little sore after the last session and I did a little more of the exercises. I'm doing okay today.      OBJECTIVE  Shoulder ROM today :  80 deg abd (scapular plane)  100 deg flexion    Shoulder ROM flexion 113 on 4/2/21    Shoulder ROM eval:    Strength is within functional limits.    AROM left shoulder 110 degrees, abduction 92 degrees, HBB wrist to L5, ER 34 degrees.          Therapeutic Exercises (CPT 77831):     1. Review of hep    2. GH ER with staff hooklying, 10x, toweling at axilla; VC's for correction of set up    3. GH flexion with staff hooklying , 10x, VC's to keep elbow extended    4. Pulleys, x3 min, GH flexion stretch at end range     6. FR activities    13. UPOC: 5/26/2021    Therapeutic Treatments and Modalities:     1. Manual Therapy (CPT 80923), see below    2. E Stim Unattended (CPT 29267), IFC and mhp to L shoulder x 15 min    Therapeutic Treatment and Modalities Summary: PA and inf mobs at  gr III with GH at end range; pt positioned in hooklying     pec release, subscap release    Time-based treatments/modalities:         Goals:   Short Term Goals:   Pt will be compliant with daily HEP. (met)  Pt will have 20% increased mobiity in 2 weeks. (ongoing)  Short term goal time span:  2-4 weeks      Long Term Goals:    Pt will be able to sleep on his side.   Pt will be able to play tennis.   Pt will be able to tolerate overhead activities.   Quickdash score will improve 50%.  Long term goal time span:  6-8 weeks    Pain pre treatment: 0/10 pain     ASSESSMENT:   Pt reporting compliance to hep at 1x/wk; educated to  perform at least twice per day for additional benefit due to min improvements in mobility with reassessment in sessions. Hypombile GHJ inf>post with increasing ranges. Pt unable to perform abduction in frontal plane (limited to scapular plane), may benefit from t/s and scapular mobilization in subsequent sessions in addition to addressing GHJ.      PLAN/RECOMMENDATIONS:   Plan for treatment: therapy treatment to continue next visit.  Planned interventions for next visit: Progress AROM as tolerated; add t/s and scapular mobilization exercises.

## 2021-05-27 ENCOUNTER — PHYSICAL THERAPY (OUTPATIENT)
Dept: PHYSICAL THERAPY | Facility: MEDICAL CENTER | Age: 46
End: 2021-05-27
Attending: FAMILY MEDICINE
Payer: COMMERCIAL

## 2021-05-27 DIAGNOSIS — M75.42 IMPINGEMENT SYNDROME OF LEFT SHOULDER: ICD-10-CM

## 2021-05-27 DIAGNOSIS — M25.512 ACUTE PAIN OF LEFT SHOULDER: ICD-10-CM

## 2021-05-27 PROCEDURE — 97110 THERAPEUTIC EXERCISES: CPT

## 2021-05-27 PROCEDURE — 97014 ELECTRIC STIMULATION THERAPY: CPT

## 2021-05-27 NOTE — OP THERAPY DAILY TREATMENT
"  Outpatient Physical Therapy  DAILY TREATMENT     [facility] Outpatient Physical Therapy  [address]  [place] [address]  Phone:  [phone]  Fax:  [phone]    Date: 05/27/2021    Patient: Philippe Hillman  YOB: 1975  MRN: [id number]     Time Calculation    Start time: 1621  Stop time: 1702 Time Calculation (min): 41 minutes         Chief Complaint: shoulder pain  Visit #: 6       SUBJECTIVE:  I was a little sore after the last session and I did a little more of the exercises. I'm doing okay today.      OBJECTIVE    Restricted t/s extension; compensates with l/s extension  Impaired scapular mobility; difficulty with shrugging and  \"pulls\" at GH with scap abd  Difficulty maintaining scapular adduction during GH abd AROM    Shoulder ROM today :  80 deg abd (scapular plane)  100 deg flexion    Shoulder ROM flexion 113 on 4/2/21    Shoulder ROM eval:    Strength is within functional limits.    AROM left shoulder 110 degrees, abduction 92 degrees, HBB wrist to L5, ER 34 degrees.          Therapeutic Exercises (CPT 27368):     1. Review of hep    4. Pulleys, x2.5 min, GH flexion stretch at end range -warmup    5. FR activities, open book pec stretch, michelle wings, alt GH flexion, t/s extension, hep; increase to 4/10 pain    6. Seated t/s extension , x15 in chair, hep    13. UPOC: 5/26/2021      Therapeutic Exercise Summary: Access Code: [email or web address]  URL: [email or web address]  Date: 05/27/2021  Prepared by: Florentino Rivera    Exercises  Open Book Chest Rotation Stretch on Foam 1/2 Roll - 2-3 x daily - 7 x weekly - 1 sets - 15 reps  Hooklying Shoulder Flexion Extension AROM on Foam Roll - 2-3 x daily - 7 x weekly - 1 sets - 15 reps  Seated Thoracic Lumbar Extension with Pectoralis Stretch - 2-3 x daily - 7 x weekly - 1 sets - 15 reps    Pt performed these exercises with instruction and SPV.  Provided handout with these exercises for daily HEP.      Therapeutic " Treatments and Modalities:     2. E Stim Unattended (CPT 45086), IFC and mhp to L shoulder x 15 min    Time-based treatments/modalities:    Physical Therapy Timed Treatment Charges  Therapeutic exercise minutes (CPT 94714): 26 minutes    Goals:   Short Term Goals:   Pt will be compliant with daily HEP. (met)  Pt will have 20% increased mobiity in 2 weeks. (ongoing)  Short term goal time span:  2-4 weeks      Long Term Goals:    Pt will be able to sleep on his side.   Pt will be able to play tennis.   Pt will be able to tolerate overhead activities.   Quickdash score will improve 50%.  Long term goal time span:  6-8 weeks    Pain pre treatment: 0/10 pain     ASSESSMENT:   Restricted range into t/s extension and impaired scapular mobility including scapular adduction during GH abd; improved following mobilization today suggesting additional joint mobility restrictions than just at L GH; discussed this with pt.   Up to this point, has had limited improvements in ROM with emphasis on GH (mobility, manual); will assess improvements with addition of t/s and scapular mobility.    PLAN/RECOMMENDATIONS:   Plan for treatment: therapy treatment to continue next visit.  Assess response to t/s and scapular mobilization exercises.  Manual to these areas subsequent session

## 2021-05-28 ENCOUNTER — OFFICE VISIT (OUTPATIENT)
Dept: DERMATOLOGY | Facility: IMAGING CENTER | Age: 46
End: 2021-05-28
Payer: COMMERCIAL

## 2021-05-28 DIAGNOSIS — L91.8 SKIN TAG: ICD-10-CM

## 2021-05-28 DIAGNOSIS — L80 VITILIGO: ICD-10-CM

## 2021-05-28 DIAGNOSIS — L40.9 PSORIASIS: ICD-10-CM

## 2021-05-28 PROCEDURE — 99214 OFFICE O/P EST MOD 30 MIN: CPT | Performed by: DERMATOLOGY

## 2021-05-28 RX ORDER — TACROLIMUS 1 MG/G
OINTMENT TOPICAL
Qty: 60 G | Refills: 1 | Status: SHIPPED
Start: 2021-05-28 | End: 2021-10-21

## 2021-05-28 NOTE — PROGRESS NOTES
CC:skin lesion on face, psoriasis     Subjective:Previously seen patient here for new lesion on face and poss psoriasis    HPI/location: white patch on rt cheek   Time present: several months   Painful lesion: No  Itching lesion: No  Enlarging lesion: Yes  Anything make it better or worse?     Would like skin tags removed    psoriasis on arms been dx for 10 years, doesn't have tx because it really doesn't flare up; present today       History of skin cancer: No  History of biopsies:No  History of blistering/severe sunburns:No  Family history of skin cancer:No  Family history of atypical moles:No    ROS: no fevers/chills. ++ itch.  No cough  DermPMH: no skin cancer/melanoma  No problem-specific Assessment & Plan notes found for this encounter.    Relevant PMH: NC  Social: never smoker    PE: Gen:WDWN male in NAD. Skin: face depigmented patch on right facial cheek, triangular/angular in appearance. Arms - few psoriasiform papules on arms.  Scant skin colored tags, pericollar dist      A/P: vitiligo, r cheek:  -reviewed dx/tx on derm [place] including labs, additional testing available  -protopic 0.1% oint BID-->PRN  -sunprotection to avoid sunburn; may benefit from limited sunexposure    PSO, mild, few small plaques on arms:  -reviewed triggers to avoid  -lidex cream BID PRN    Skin tags:   -would discourage trx due to small size and risk of koebnerization of PSO/vitiligo  -f/u PRN

## 2021-06-11 ENCOUNTER — PHYSICAL THERAPY (OUTPATIENT)
Dept: PHYSICAL THERAPY | Facility: MEDICAL CENTER | Age: 46
End: 2021-06-11
Attending: FAMILY MEDICINE
Payer: COMMERCIAL

## 2021-06-11 DIAGNOSIS — M75.42 IMPINGEMENT SYNDROME OF LEFT SHOULDER: ICD-10-CM

## 2021-06-11 DIAGNOSIS — M25.512 ACUTE PAIN OF LEFT SHOULDER: ICD-10-CM

## 2021-06-11 PROCEDURE — 97014 ELECTRIC STIMULATION THERAPY: CPT

## 2021-06-11 PROCEDURE — 97110 THERAPEUTIC EXERCISES: CPT

## 2021-06-11 NOTE — OP THERAPY DAILY TREATMENT
Outpatient Physical Therapy  DAILY TREATMENT     [facility] Outpatient Physical Therapy  [address]  [place] [address]  Phone:  [phone]  Fax:  [phone]    Date: 06/11/2021    Patient: Philippe Hillman  YOB: 1975  MRN: [id number]     Time Calculation    Start time: 1617  Stop time: 1700 Time Calculation (min): 43 minutes         Chief Complaint: shoulder pain  Visit #: 7       SUBJECTIVE:  I was a little sore after the last session and I did a little more of the exercises. I'm doing okay today.      OBJECTIVE  See progress note      Therapeutic Exercises (CPT 82608):     1. Review of hep    4. Pulleys, x2.5 min, GH flexion stretch at end range -warmup    5. FR activities, open book pec stretch, michelle wings, alt GH flexion, t/s extension, reviewed     6. Seated t/s extension , x15 in chair, reviewed    7. [place], orange TB; x10 with 10 sec hold, hep    8. Pt education, re: frozen shoulder education for home, HO provided    9. Pt education, re: break from therapy due to disease process and no sig improvements from eval    13. UPOC: 5/26/2021      Therapeutic Exercise Summary: Access Code: [email or web address]  URL: [email or web address]  Date: 06/11/2021  Prepared by: Florentino Rivera    Exercises  Open Book Chest Stretch on Towel Roll - 1-2 x daily - 7 x weekly - 1 sets - 10 reps  Overhead Reach on Foam Roll - 2 x daily - 7 x weekly - 1 sets - 10 reps  [place] on Foam Roll - 2 x daily - 7 x weekly - 1 sets - 10 reps  Seated Thoracic Lumbar Extension with Pectoralis Stretch - 2 x daily - 7 x weekly - 1 sets - 10 reps  Shoulder External Rotation and Scapular Retraction with Resistance - 2 x daily - 7 x weekly - 1 sets - 10 reps - 10 sec hold    Patient Education  Frozen Shoulder  Pt performed these exercises with instruction and SPV.  Provided handout with these exercises for daily HEP.      Therapeutic Treatments and Modalities:     2. E Stim Unattended (CPT  08047), IFC and mhp to L shoulder x 15 min    Time-based treatments/modalities:    Physical Therapy Timed Treatment Charges  Therapeutic exercise minutes (CPT 13450): 28 minutes        Pain pre treatment: 0/10 pain     ASSESSMENT: see progress note      PLAN/RECOMMENDATIONS:   Plan for treatment: therapy treatment to continue next visit. See progress note

## 2021-06-11 NOTE — OP THERAPY PROGRESS SUMMARY
Outpatient Physical Therapy  PROGRESS SUMMARY NOTE      [facility] Outpatient Physical Therapy  [address]  [place] [address]  Phone:  [phone]  Fax:  [phone]    Date of Visit: 06/11/2021    Patient: Philippe Hillman  YOB: 1975  MRN: [id number]     Referring Provider: Neha Rivera M.D.  [address] [facility] [address]  [place] [address]   Referring Diagnosis Impingement syndrome of left shoulder [M75.42];Pain in left shoulder [M25.512]     Visit Diagnoses     ICD-10-CM   1. Acute pain of left shoulder  M25.512   2. Impingement syndrome of left shoulder  M75.42       Rehab Potential: fair    Progress Report Period: 3/26/21-6/11/21              Objective Findings and Assessment:   Patient progression towards goals:   Pt has been seen for 6 visits of physical therapy. Gap in therapy from 4/2/21 until 5/17/21 secondary to pt being on vacation. Despite return and compliance to hep, no significant improvements in range since initial eval (see objective findings). Pt's findings continue to be consistent with Frozen Shoulder even though not formally diagnosed. Discussed break from therapy due to not seeing functional improvements with pt reluctant but ultimately agreeable. Emphasis on indep hep with follow up in four weeks to assess for improvements and continuance of PT. If no further improvements, warrant from follow up with MD re: alternate options.      Objective findings and assessment details: Shoulder ROM today:   75 deg abd (scapular plane)  103 deg flexion  HBB wrist to L4  ER 33 degrees     Shoulder ROM 5/27/21:  80 deg abd (scapular plane)  100 deg flexion    Shoulder ROM  On 4/2/21:  flexion 113     Shoulder ROM at eval:  Flexion 110 degrees  abduction 92 degrees  HBB wrist to L5  ER 34 degrees      Goals:   Short Term Goals:   Goals:   Short Term Goals:   Pt will be compliant with daily HEP. (met)  Pt will have 20% increased mobiity in 2 weeks. (not  met)  Short term goal time span:  2-4 weeks    Short term goal time span:  2-4 weeks      Long Term Goals:    Pt will be able to sleep on his side. (partially met)  Pt will be able to play tennis.  (partially met)  Pt will be able to tolerate overhead activities. (ongoing)  Quickdash score will improve 50%. (ongoing)    Long term goal time span:  1-2 months    Plan:   Planned therapy interventions:  Neuromuscular Re-education (CPT 76530), E Stim Unattended (CPT 62429), Manual Therapy (CPT 27537) and Therapeutic Exercise (CPT 36955)  Frequency:  1x week  Duration in visits:  6  Plan details:  UPOC: 8/20/21  *POC extended to allot for break and allowance of time due to disease process      Referring provider co-signature:  I have reviewed this plan of care and my co-signature certifies the need for services.     Certification Period: 06/11/2021 to 8/20/21    Physician Signature: ________________________________ Date: ______________

## 2021-06-16 ENCOUNTER — APPOINTMENT (OUTPATIENT)
Dept: PHYSICAL THERAPY | Facility: MEDICAL CENTER | Age: 46
End: 2021-06-16
Attending: FAMILY MEDICINE
Payer: COMMERCIAL

## 2021-07-20 ENCOUNTER — OFFICE VISIT (OUTPATIENT)
Dept: MEDICAL GROUP | Facility: LAB | Age: 46
End: 2021-07-20
Payer: COMMERCIAL

## 2021-07-20 VITALS
RESPIRATION RATE: 16 BRPM | OXYGEN SATURATION: 97 % | DIASTOLIC BLOOD PRESSURE: 78 MMHG | SYSTOLIC BLOOD PRESSURE: 102 MMHG | WEIGHT: 147 LBS | HEIGHT: 67 IN | BODY MASS INDEX: 23.07 KG/M2 | HEART RATE: 80 BPM | TEMPERATURE: 97.5 F

## 2021-07-20 DIAGNOSIS — H53.9 VISION CHANGES: ICD-10-CM

## 2021-07-20 DIAGNOSIS — M1A.09X0 IDIOPATHIC CHRONIC GOUT OF MULTIPLE SITES WITHOUT TOPHUS: ICD-10-CM

## 2021-07-20 DIAGNOSIS — G89.29 CHRONIC LEFT SHOULDER PAIN: ICD-10-CM

## 2021-07-20 DIAGNOSIS — M25.512 CHRONIC LEFT SHOULDER PAIN: ICD-10-CM

## 2021-07-20 DIAGNOSIS — Z86.718 HISTORY OF DVT OF LOWER EXTREMITY: ICD-10-CM

## 2021-07-20 DIAGNOSIS — M75.02 ADHESIVE CAPSULITIS OF LEFT SHOULDER: ICD-10-CM

## 2021-07-20 PROBLEM — I82.4Y9 ACUTE DEEP VEIN THROMBOSIS (DVT) OF PROXIMAL VEIN OF LOWER EXTREMITY, UNSPECIFIED LATERALITY (HCC): Status: RESOLVED | Noted: 2021-04-09 | Resolved: 2021-07-20

## 2021-07-20 PROCEDURE — 99214 OFFICE O/P EST MOD 30 MIN: CPT | Performed by: FAMILY MEDICINE

## 2021-07-20 RX ORDER — ALLOPURINOL 300 MG/1
300 TABLET ORAL DAILY
Qty: 90 TABLET | Refills: 3 | Status: SHIPPED
Start: 2021-07-20 | End: 2021-11-08

## 2021-07-20 RX ORDER — ALLOPURINOL 100 MG/1
TABLET ORAL
COMMUNITY
Start: 2021-05-24 | End: 2021-07-20

## 2021-07-20 NOTE — PATIENT INSTRUCTIONS
Adhesive Capsulitis    Adhesive capsulitis, also called frozen shoulder, causes the shoulder to become stiff and painful to move. This condition happens when there is inflammation of the tendons and ligaments that surround the shoulder joint (shoulder capsule).  What are the causes?  This condition may be caused by:  · An injury to your shoulder joint.  · Straining your shoulder.  · Not moving your shoulder for a period of time. This can happen if your arm was injured or in a sling.  · Long-standing conditions, such as:  ? Diabetes.  ? Thyroid problems.  ? Heart disease.  ? Stroke.  ? Rheumatoid arthritis.  ? Lung disease.  In some cases, the cause is not known.  What increases the risk?  You are more likely to develop this condition if you are:  · A woman.  · Older than 40 years of age.  What are the signs or symptoms?  Symptoms of this condition include:  · Pain in your shoulder when you move your arm. There may also be pain when parts of your shoulder are touched. The pain may be worse at night or when you are resting.  · A sore or aching shoulder.  · The inability to move your shoulder normally.  · Muscle spasms.  How is this diagnosed?  This condition is diagnosed with a physical exam and imaging tests, such as an X-ray or MRI.  How is this treated?  This condition may be treated with:  · Treatment of the underlying cause or condition.  · Medicine. Medicine may be given to relieve pain, inflammation, or muscle spasms.  · Steroid injections into the shoulder joint.  · Physical therapy. This involves performing exercises to get the shoulder moving again.  · Acupuncture. This is a type of treatment that involves stimulating specific points on your body by inserting thin needles through your skin.  · Shoulder manipulation. This is a procedure to move the shoulder into another position. It is done after you are given a medicine to make you fall asleep (general anesthetic). The joint may also be injected with salt  water at high pressure to break down scarring.  · Surgery. This may be done in severe cases when other treatments have failed.  Although most people recover completely from adhesive capsulitis, some may not regain full shoulder movement.  Follow these instructions at home:  Managing pain, stiffness, and swelling         · If directed, put ice on the injured area:  ? Put ice in a plastic bag.  ? Place a towel between your skin and the bag.  ? Leave the ice on for 20 minutes, 2-3 times per day.  · If directed, apply heat to the affected area before you exercise. Use the heat source that your health care provider recommends, such as a moist heat pack or a heating pad.  ? Place a towel between your skin and the heat source.  ? Leave the heat on for 20-30 minutes.  ? Remove the heat if your skin turns bright red. This is especially important if you are unable to feel pain, heat, or cold. You may have a greater risk of getting burned.  General instructions  · Take over-the-counter and prescription medicines only as told by your health care provider.  · If you are being treated with physical therapy, follow instructions from your physical therapist.  · Avoid exercises that put a lot of demand on your shoulder, such as throwing. These exercises can make pain worse.  · Keep all follow-up visits as told by your health care provider. This is important.  Contact a health care provider if:  · You develop new symptoms.  · Your symptoms get worse.  Summary  · Adhesive capsulitis, also called frozen shoulder, causes the shoulder to become stiff and painful to move.  · You are more likely to have this condition if you are a woman and over age 40.  · It is treated with physical therapy, medicines, and sometimes surgery.  This information is not intended to replace advice given to you by your health care provider. Make sure you discuss any questions you have with your health care provider.  Document Released: 10/15/2010 Document  Revised: 05/24/2019 Document Reviewed: 05/24/2019  Elsevier Patient Education © 2020 [employer]

## 2021-07-26 NOTE — ASSESSMENT & PLAN NOTE
Patient is requesting a referral to ophthalmology for visual changes.  He reports that his vision seems more blurred.  He is using drops for dry eyes.

## 2021-07-26 NOTE — PROGRESS NOTES
Subjective:     Chief Complaint   Patient presents with   • Referral Needed     opthalmology   • Shoulder Pain     left frozen shoulder   • Other     history of DVT FV       Philippe Hillman is a 45 y.o. male here today for evaluation and management of:    Vision changes  Patient is requesting a referral to ophthalmology for visual changes.  He reports that his vision seems more blurred.  He is using drops for dry eyes.    Chronic idiopathic gout of multiple sites  Patient would like a refill of his colchicine.  He is currently well controlled on allopurinol 300 mg daily.  Denies recent flare-ups. Denies side effects from medication--tolerating well.       History of DVT of lower extremity  Patient was on Xarelto for 3 months for DVT that was provoked with travel.  We discussed using aspirin and compression stockings next time he travels.    Chronic left shoulder pain  Patient has been going to physical therapy and he reports that it is helped with the pain but he now has what the physical therapist is calling a frozen shoulder.  He is unable to lift his arm above 45 degrees both anteriorly and laterally.  There is a family history of dermatomyositis       No Known Allergies    Current medicines (including changes today)  Current Outpatient Medications   Medication Sig Dispense Refill   • allopurinol (ZYLOPRIM) 300 MG Tab Take 1 tablet by mouth every day. 90 tablet 3   • tacrolimus (PROTOPIC) 0.1 % Ointment AAA face BID, stop as skin repigments. 60 g 1   • fluocinonide (LIDEX) 0.05 % Cream AAA hands/arms BID PRN psoriasis.  Avoid use on face, axilla, groin. 30 g 1   • colchicine (COLCRYS) 0.6 MG Tab 2 tabs at onset and one po 8 hours later 20 tablet 1   • indomethacin (INDOCIN) 50 MG Cap Take 1 Cap by mouth 3 times a day. 90 Cap 0     No current facility-administered medications for this visit.       He  has a past medical history of Measles (1983).    Patient Active Problem List    Diagnosis Date Noted   • Chronic  "anticoagulation 04/09/2021   • Chronic left shoulder pain 02/25/2021   • Family history of dermatomyositis 02/25/2021   • History of DVT of lower extremity 02/09/2021   • Allergic dermatitis 07/09/2020   • Dry eye 08/09/2018   • Chronic idiopathic gout of multiple sites 04/20/2018   • Mallet deformity of right middle finger 08/03/2017   • Hyperglycemia 09/19/2016   • Allergic rhinitis due to pollen 09/12/2016   • Vision changes 09/12/2016   • High serum low-density lipoprotein (LDL) 09/12/2016       ROS   No fever or chills.  No nausea or vomiting.  No chest pain or palpitations.  No cough or SOB.  No pain with urination or hematuria.  No black or bloody stools.       Objective:     /78 (BP Location: Right arm, Patient Position: Sitting, BP Cuff Size: Adult)   Pulse 80   Temp 36.4 °C (97.5 °F) (Temporal)   Resp 16   Ht 1.702 m (5' 7\")   Wt 66.7 kg (147 lb)   SpO2 97%  Body mass index is 23.02 kg/m².   Physical Exam:  Well developed, well nourished.  Alert, oriented in no acute distress.  Eye contact is good, speech goal directed, affect calm  Eyes: conjunctiva non-injected, sclera non-icteric.  Neck Supple.  No adenopathy or masses in the neck or supraclavicular regions. No thyromegaly  Lungs: clear to auscultation bilaterally with good excursion. No wheezes or rhonchi  CV: regular rate and rhythm. No murmur  Shoulder/arm exam: No deformity, erythema, edema or ecchymosis. Tenderness to palpation anteriorly. ROM fairly affected in elevation. Hawkin's test positive. Neer's test positive.  Decreased strength in elevation secondary to inability to physically move the arm.          Assessment and Plan:   The following treatment plan was discussed    1. Chronic left shoulder pain  Patient has been going to physical therapy and he reports that it is helped with the pain but he now has what the physical therapist is calling a frozen shoulder.  He is unable to lift his arm above 45 degrees both anteriorly and " laterally.  There is a family history of dermatomyositis.  Given his significant lack of mobility will refer to orthopedics.  Discussed if this is adhesive capsulitis further therapy will be needed.    - REFERRAL TO ORTHOPEDICS    2. Adhesive capsulitis of left shoulder  Patient has been going to physical therapy and he reports that it is helped with the pain but he now has what the physical therapist is calling a frozen shoulder.  He is unable to lift his arm above 45 degrees both anteriorly and laterally.  There is a family history of dermatomyositis.  Given his significant lack of mobility will refer to orthopedics.  Discussed if this is adhesive capsulitis further therapy will be needed.    - REFERRAL TO ORTHOPEDICS    3. Vision changes  Refer to ophthalmology for further evaluation treatment  - REFERRAL TO OPHTHALMOLOGY    4. History of DVT of lower extremity  Discussed taking a baby aspirin or full aspirin on travel days and using compression stockings.  Getting up frequently moving around is also important.  Again discussed signs and symptoms of DVT    5. Idiopathic chronic gout of multiple sites without tophus  Refill allopurinol 300 mg daily.  Patient is doing well on this current dose.  - allopurinol (ZYLOPRIM) 300 MG Tab; Take 1 tablet by mouth every day.  Dispense: 90 tablet; Refill: 3    Any change or worsening of signs or symptoms, patient encouraged to follow-up or report to the emergency room for further evaluation. Patient understands and agrees.    Followup: Return in about 1 year (around 7/20/2022).

## 2021-07-26 NOTE — ASSESSMENT & PLAN NOTE
Patient would like a refill of his colchicine.  He is currently well controlled on allopurinol 300 mg daily.  Denies recent flare-ups. Denies side effects from medication--tolerating well.

## 2021-07-26 NOTE — ASSESSMENT & PLAN NOTE
Patient was on Xarelto for 3 months for DVT that was provoked with travel.  We discussed using aspirin and compression stockings next time he travels.

## 2021-07-28 ENCOUNTER — OFFICE VISIT (OUTPATIENT)
Dept: MEDICAL GROUP | Facility: LAB | Age: 46
End: 2021-07-28
Payer: COMMERCIAL

## 2021-07-28 ENCOUNTER — HOSPITAL ENCOUNTER (OUTPATIENT)
Dept: LAB | Facility: MEDICAL CENTER | Age: 46
End: 2021-07-28
Attending: FAMILY MEDICINE
Payer: COMMERCIAL

## 2021-07-28 VITALS
DIASTOLIC BLOOD PRESSURE: 70 MMHG | OXYGEN SATURATION: 98 % | BODY MASS INDEX: 23.07 KG/M2 | SYSTOLIC BLOOD PRESSURE: 100 MMHG | TEMPERATURE: 98.3 F | RESPIRATION RATE: 16 BRPM | HEIGHT: 67 IN | HEART RATE: 88 BPM | WEIGHT: 147 LBS

## 2021-07-28 DIAGNOSIS — R73.9 HYPERGLYCEMIA: ICD-10-CM

## 2021-07-28 DIAGNOSIS — R79.89 HIGH SERUM LOW-DENSITY LIPOPROTEIN (LDL): ICD-10-CM

## 2021-07-28 DIAGNOSIS — H04.129 DRY EYE: ICD-10-CM

## 2021-07-28 DIAGNOSIS — Z13.21 ENCOUNTER FOR VITAMIN DEFICIENCY SCREENING: ICD-10-CM

## 2021-07-28 DIAGNOSIS — M1A.09X0 IDIOPATHIC CHRONIC GOUT OF MULTIPLE SITES WITHOUT TOPHUS: ICD-10-CM

## 2021-07-28 DIAGNOSIS — Z00.00 WELL ADULT EXAM: ICD-10-CM

## 2021-07-28 PROBLEM — Z79.01 CHRONIC ANTICOAGULATION: Status: RESOLVED | Noted: 2021-04-09 | Resolved: 2021-07-28

## 2021-07-28 LAB
25(OH)D3 SERPL-MCNC: 29 NG/ML (ref 30–100)
ALBUMIN SERPL BCP-MCNC: 4.3 G/DL (ref 3.2–4.9)
ALBUMIN/GLOB SERPL: 1.5 G/DL
ALP SERPL-CCNC: 73 U/L (ref 30–99)
ALT SERPL-CCNC: 23 U/L (ref 2–50)
ANION GAP SERPL CALC-SCNC: 15 MMOL/L (ref 7–16)
AST SERPL-CCNC: 23 U/L (ref 12–45)
BILIRUB SERPL-MCNC: 0.4 MG/DL (ref 0.1–1.5)
BUN SERPL-MCNC: 15 MG/DL (ref 8–22)
CALCIUM SERPL-MCNC: 8.9 MG/DL (ref 8.5–10.5)
CHLORIDE SERPL-SCNC: 103 MMOL/L (ref 96–112)
CHOLEST SERPL-MCNC: 181 MG/DL (ref 100–199)
CO2 SERPL-SCNC: 23 MMOL/L (ref 20–33)
CREAT SERPL-MCNC: 0.99 MG/DL (ref 0.5–1.4)
EST. AVERAGE GLUCOSE BLD GHB EST-MCNC: 117 MG/DL
FASTING STATUS PATIENT QL REPORTED: NORMAL
GLOBULIN SER CALC-MCNC: 2.9 G/DL (ref 1.9–3.5)
GLUCOSE SERPL-MCNC: 96 MG/DL (ref 65–99)
HBA1C MFR BLD: 5.7 % (ref 4–5.6)
HDLC SERPL-MCNC: 46 MG/DL
LDLC SERPL CALC-MCNC: 111 MG/DL
POTASSIUM SERPL-SCNC: 4.1 MMOL/L (ref 3.6–5.5)
PROT SERPL-MCNC: 7.2 G/DL (ref 6–8.2)
SODIUM SERPL-SCNC: 141 MMOL/L (ref 135–145)
TRIGL SERPL-MCNC: 120 MG/DL (ref 0–149)
TSH SERPL DL<=0.005 MIU/L-ACNC: 1.16 UIU/ML (ref 0.38–5.33)
URATE SERPL-MCNC: 8.6 MG/DL (ref 2.5–8.3)

## 2021-07-28 PROCEDURE — 84443 ASSAY THYROID STIM HORMONE: CPT

## 2021-07-28 PROCEDURE — 83516 IMMUNOASSAY NONANTIBODY: CPT

## 2021-07-28 PROCEDURE — 83036 HEMOGLOBIN GLYCOSYLATED A1C: CPT

## 2021-07-28 PROCEDURE — 99396 PREV VISIT EST AGE 40-64: CPT | Performed by: FAMILY MEDICINE

## 2021-07-28 PROCEDURE — 86235 NUCLEAR ANTIGEN ANTIBODY: CPT

## 2021-07-28 PROCEDURE — 80053 COMPREHEN METABOLIC PANEL: CPT

## 2021-07-28 PROCEDURE — 82306 VITAMIN D 25 HYDROXY: CPT

## 2021-07-28 PROCEDURE — 80061 LIPID PANEL: CPT

## 2021-07-28 PROCEDURE — 84550 ASSAY OF BLOOD/URIC ACID: CPT

## 2021-07-28 PROCEDURE — 36415 COLL VENOUS BLD VENIPUNCTURE: CPT

## 2021-07-28 NOTE — PROGRESS NOTES
.  Subjective:     CC:   Chief Complaint   Patient presents with   • Annual Exam       Patient is doing well without any real complaints. He is due for his annual lab also.    He  has a past medical history of Measles (1983).  He  has a past surgical history that includes eye surgery.    Family History   Problem Relation Age of Onset   • Hypertension Mother    • Hypertension Father    • Heart Disease Paternal Uncle    • Stroke Paternal Uncle      Social History     Tobacco Use   • Smoking status: Never Smoker   • Smokeless tobacco: Never Used   Vaping Use   • Vaping Use: Never used   Substance Use Topics   • Alcohol use: Yes     Comment: once a month   • Drug use: No     He  reports previously being sexually active.    Patient Active Problem List    Diagnosis Date Noted   • Chronic left shoulder pain 02/25/2021   • Family history of dermatomyositis 02/25/2021   • History of DVT of lower extremity 02/09/2021   • Allergic dermatitis 07/09/2020   • Dry eye 08/09/2018   • Chronic idiopathic gout of multiple sites 04/20/2018   • Mallet deformity of right middle finger 08/03/2017   • Hyperglycemia 09/19/2016   • Allergic rhinitis due to pollen 09/12/2016   • High serum low-density lipoprotein (LDL) 09/12/2016     Current Outpatient Medications   Medication Sig Dispense Refill   • allopurinol (ZYLOPRIM) 300 MG Tab Take 1 tablet by mouth every day. 90 tablet 3   • tacrolimus (PROTOPIC) 0.1 % Ointment AAA face BID, stop as skin repigments. 60 g 1   • fluocinonide (LIDEX) 0.05 % Cream AAA hands/arms BID PRN psoriasis.  Avoid use on face, axilla, groin. 30 g 1   • colchicine (COLCRYS) 0.6 MG Tab 2 tabs at onset and one po 8 hours later 20 tablet 1   • indomethacin (INDOCIN) 50 MG Cap Take 1 Cap by mouth 3 times a day. 90 Cap 0     No current facility-administered medications for this visit.     No Known Allergies    Review of Systems   Constitutional: Negative for fever, chills and malaise/fatigue.   HENT: Negative for  "congestion.    Eyes: Negative for pain.   Respiratory: Negative for cough and shortness of breath.    Cardiovascular: Negative for chest pain and leg swelling.   Gastrointestinal: Negative for nausea, vomiting, abdominal pain and diarrhea.   Genitourinary: Negative for dysuria and hematuria.   Skin: Negative for rash.   Neurological: Negative for dizziness, focal weakness and headaches.   Endo/Heme/Allergies: Does not bruise/bleed easily.   Psychiatric/Behavioral: Negative for depression.  The patient is not nervous/anxious.      Objective:   /70 (BP Location: Right arm, Patient Position: Sitting, BP Cuff Size: Adult)   Pulse 88   Temp 36.8 °C (98.3 °F) (Temporal)   Resp 16   Ht 1.702 m (5' 7\")   Wt 66.7 kg (147 lb)   SpO2 98%   BMI 23.02 kg/m²      Wt Readings from Last 4 Encounters:   07/28/21 66.7 kg (147 lb)   07/20/21 66.7 kg (147 lb)   05/14/21 66.7 kg (147 lb)   03/18/21 65.8 kg (145 lb)       A chaperone was offered to the patient during today's exam. Patient declined chaperone.    Physical Exam:  Constitutional: Well-developed and well-nourished. Not diaphoretic. No distress.   Skin: Skin is warm and dry. No rash noted.  Head: Atraumatic without lesions.  Eyes: Conjunctivae and extraocular motions are normal. Pupils are equal, round, and reactive to light. No scleral icterus.   Ears:  External ears unremarkable. Tympanic membranes clear and intact.  Mouth/Throat: Tongue normal. Oropharynx is clear and moist. Posterior pharynx without erythema or exudates.  Neck: Supple, trachea midline. Normal range of motion. No thyromegaly present. No lymphadenopathy--cervical or supraclavicular.  Cardiovascular: Regular rate and rhythm, S1 and S2 without murmur, rubs, or gallops.    Respiratory: Effort normal. Clear to auscultation throughout. No adventitious sounds.   Abdomen: Soft, non tender, and without distention. Active bowel sounds in all four quadrants. No rebound, guarding, masses or " HSM.  Genitalia: normal uncircumcised penis, no urethral discharge, scrotal contents normal to inspection and palpation, normal testes palpated bilaterally, no hernia detected  Extremities: No cyanosis, clubbing, erythema, nor edema. Distal pulses intact and symmetric.   Musculoskeletal: All major joints AROM full in all directions without pain.  Neurological: Alert and oriented x 3. Grossly non-focal. Strength and sensation grossly intact. DTRs 2+/3 and symmetric.   Psychiatric:  Behavior, mood, and affect are appropriate.      Assessment and Plan:   1. Well adult exam  routine anticipatory guidance.  Discussed diet and exercise, uncircumcised penis care and adequate intake of calcium and vitamin D   await lab results  2. High serum low-density lipoprotein (LDL)  Check labs. Await results. Mediterranean eating plan recommended. Consider medication if he is trending upwards.  - Lipid Profile; Future  - TSH; Future    3. Hyperglycemia  Check labs. Await results. Mediterranean eating plan recommended  - Comp Metabolic Panel; Future  - HEMOGLOBIN A1C; Future    4. Idiopathic chronic gout of multiple sites without tophus  check uric acid and adjust allopurinol dose as needed  - URIC ACID; Future    5. Dry eye  screen for Sjogren's given his dry eyes. He is following with ophthalmology  - CONNECTIVE TISSUE DISEASES PROFILE; Future    6. Encounter for vitamin deficiency screening  Screening labs ordered.  Await results for counseling.  - VITAMIN D,25 HYDROXY; Future      Labs per orders  Immunizations per orders  Patient counseled about skin care, diet, supplements, and exercise.  Discussed diet and exercise, uncircumcised penis care and adequate intake of calcium and vitamin D     Follow-up: Return in about 1 year (around 7/28/2022).

## 2021-07-28 NOTE — PATIENT INSTRUCTIONS
Preventive Care 40-64 Years Old, Male  Preventive care refers to lifestyle choices and visits with your health care provider that can promote health and wellness. This includes:  · A yearly physical exam. This is also called an annual well check.  · Regular dental and eye exams.  · Immunizations.  · Screening for certain conditions.  · Healthy lifestyle choices, such as eating a healthy diet, getting regular exercise, not using drugs or products that contain nicotine and tobacco, and limiting alcohol use.  What can I expect for my preventive care visit?  Physical exam  Your health care provider will check:  · Height and weight. These may be used to calculate body mass index (BMI), which is a measurement that tells if you are at a healthy weight.  · Heart rate and blood pressure.  · Your skin for abnormal spots.  Counseling  Your health care provider may ask you questions about:  · Alcohol, tobacco, and drug use.  · Emotional well-being.  · Home and relationship well-being.  · Sexual activity.  · Eating habits.  · Work and work environment.  What immunizations do I need?    Influenza (flu) vaccine  · This is recommended every year.  Tetanus, diphtheria, and pertussis (Tdap) vaccine  · You may need a Td booster every 10 years.  Varicella (chickenpox) vaccine  · You may need this vaccine if you have not already been vaccinated.  Zoster (shingles) vaccine  · You may need this after age 60.  Measles, mumps, and rubella (MMR) vaccine  · You may need at least one dose of MMR if you were born in 1957 or later. You may also need a second dose.  Pneumococcal conjugate (PCV13) vaccine  · You may need this if you have certain conditions and were not previously vaccinated.  Pneumococcal polysaccharide (PPSV23) vaccine  · You may need one or two doses if you smoke cigarettes or if you have certain conditions.  Meningococcal conjugate (MenACWY) vaccine  · You may need this if you have certain conditions.  Hepatitis A  vaccine  · You may need this if you have certain conditions or if you travel or work in places where you may be exposed to hepatitis A.  Hepatitis B vaccine  · You may need this if you have certain conditions or if you travel or work in places where you may be exposed to hepatitis B.  Haemophilus influenzae type b (Hib) vaccine  · You may need this if you have certain risk factors.  Human papillomavirus (HPV) vaccine  · If recommended by your health care provider, you may need three doses over 6 months.  You may receive vaccines as individual doses or as more than one vaccine together in one shot (combination vaccines). Talk with your health care provider about the risks and benefits of combination vaccines.  What tests do I need?  Blood tests  · Lipid and cholesterol levels. These may be checked every 5 years, or more frequently if you are over 50 years old.  · Hepatitis C test.  · Hepatitis B test.  Screening  · Lung cancer screening. You may have this screening every year starting at age 55 if you have a 30-pack-year history of smoking and currently smoke or have quit within the past 15 years.  · Prostate cancer screening. Recommendations will vary depending on your family history and other risks.  · Colorectal cancer screening. All adults should have this screening starting at age 50 and continuing until age 75. Your health care provider may recommend screening at age 45 if you are at increased risk. You will have tests every 1-10 years, depending on your results and the type of screening test.  · Diabetes screening. This is done by checking your blood sugar (glucose) after you have not eaten for a while (fasting). You may have this done every 1-3 years.  · Sexually transmitted disease (STD) testing.  Follow these instructions at home:  Eating and drinking  · Eat a diet that includes fresh fruits and vegetables, whole grains, lean protein, and low-fat dairy products.  · Take vitamin and mineral supplements as  recommended by your health care provider.  · Do not drink alcohol if your health care provider tells you not to drink.  · If you drink alcohol:  ? Limit how much you have to 0-2 drinks a day.  ? Be aware of how much alcohol is in your drink. In the [place] one drink equals one 12 oz bottle of beer (355 mL), one 5 oz glass of wine (148 mL), or one 1½ oz glass of hard liquor (44 mL).  Lifestyle  · Take daily care of your teeth and gums.  · Stay active. Exercise for at least 30 minutes on 5 or more days each week.  · Do not use any products that contain nicotine or tobacco, such as cigarettes, e-cigarettes, and chewing tobacco. If you need help quitting, ask your health care provider.  · If you are sexually active, practice safe sex. Use a condom or other form of protection to prevent STIs (sexually transmitted infections).  · Talk with your health care provider about taking a low-dose aspirin every day starting at age 50.  What's next?  · Go to your health care provider once a year for a well check visit.  · Ask your health care provider how often you should have your eyes and teeth checked.  · Stay up to date on all vaccines.  This information is not intended to replace advice given to you by your health care provider. Make sure you discuss any questions you have with your health care provider.  Document Released: 01/13/2017 Document Revised: 12/12/2019 Document Reviewed: 12/12/2019  [employer] Patient Education © 2020 [employer]

## 2021-07-30 NOTE — OP THERAPY DAILY TREATMENT
Outpatient Physical Therapy  DAILY TREATMENT     [facility]  [address]  [place] [address]  Phone:  [phone]  Fax:  [phone]    Date: 05/17/2021    Patient: Philippe Hillman  YOB: 1975  MRN: [id number]     Time Calculation    Start time: 1619  Stop time: 1650 Time Calculation (min): 31 minutes         Chief Complaint: shoulder pain  Visit #: 4       SUBJECTIVE:  I did the exercises but minimal and I feel like I have a little more range but it still has a way to go. The pain is getting better, I can sleep on the left a little more. I was able to play tennis and I didn't have as many sharp pains. I can do more with it.       OBJECTIVE  Shoulder ROM today :  75 deg abd  95 deg flexion    Shoulder ROM flexion 113 on 4/2/21    Shoulder ROM eval:    Strength is within functional limits.    AROM left shoulder 110 degrees, abduction 92 degrees, HBB wrist to L5, ER 34 degrees.          Therapeutic Exercises (CPT 57352):     1. Review of hep    2. GH ER with staff hooklying, 10x    3. GH ER with staff GH flexion and elbow flexion 90/90 deg    13. UPOC: 5/26/2021    Therapeutic Treatments and Modalities:     1. Manual Therapy (CPT 40544), see below    Therapeutic Treatment and Modalities Summary: GH ER contract-relax method with pt. First with GH in scapular plane followed with L GH overhead in 75 deg GH flexion and 90 deg elbow flexion    Time-based treatments/modalities:    Physical Therapy Timed Treatment Charges  Manual therapy minutes (CPT 83396): 13 minutes  Therapeutic exercise minutes (CPT 61391): 18 minutes    Goals:   Short Term Goals:   Pt will be compliant with daily HEP.   Pt will have 20% increased mobiity in 2 weeks.   Short term goal time span:  2-4 weeks      Long Term Goals:    Pt will be able to sleep on his side.   Pt will be able to play tennis.   Pt will be able to tolerate overhead activities.   Quickdash score will improve  Needing refills  Last OV 5/26/21 RG, CNP  Pt would like a call when completed. 50%.  Long term goal time span:  6-8 weeks    Pain post treatment: 1/10 pain following manual    ASSESSMENT:   Patient returned from vacation; reporting min/mod compliance to hep. Pt overall reporting improved ADL's/hobbies and decrease in pain; however, objectively no significant improvement in AROM compared to last session and initial eval. Good tolerance to manual with mild pain, no sig increased following completion of treatment. Will see how ROM improves now that patient will be seen more consistently in therapy in addition to increased compliance with hep.         PLAN/RECOMMENDATIONS:   Plan for treatment: therapy treatment to continue next visit.  Planned interventions for next visit: Progress AROM as tolerated

## 2021-08-02 LAB
CENTROMERE IGG TITR SER IF: 0 AU/ML (ref 0–40)
ENA JO1 AB TITR SER: 1 AU/ML (ref 0–40)
ENA SCL70 IGG SER QL: 11 AU/ML (ref 0–40)
ENA SM IGG SER-ACNC: 2 AU/ML (ref 0–40)
ENA SS-B IGG SER IA-ACNC: 0 AU/ML (ref 0–40)
RIBOSOMAL P AB SER-ACNC: 6 AU/ML (ref 0–40)
SSA52 R0ENA AB IGG Q0420: 0 AU/ML (ref 0–40)
SSA60 R0ENA AB IGG Q0419: 0 AU/ML (ref 0–40)
U1 SNRNP IGG SER QL: NORMAL

## 2021-10-21 ENCOUNTER — HOSPITAL ENCOUNTER (OUTPATIENT)
Dept: RADIOLOGY | Facility: MEDICAL CENTER | Age: 46
End: 2021-10-21
Attending: FAMILY MEDICINE
Payer: COMMERCIAL

## 2021-10-21 ENCOUNTER — OFFICE VISIT (OUTPATIENT)
Dept: MEDICAL GROUP | Facility: LAB | Age: 46
End: 2021-10-21
Payer: COMMERCIAL

## 2021-10-21 VITALS
TEMPERATURE: 97.9 F | HEIGHT: 67 IN | OXYGEN SATURATION: 95 % | BODY MASS INDEX: 23.32 KG/M2 | RESPIRATION RATE: 12 BRPM | HEART RATE: 98 BPM | DIASTOLIC BLOOD PRESSURE: 66 MMHG | SYSTOLIC BLOOD PRESSURE: 108 MMHG | WEIGHT: 148.6 LBS

## 2021-10-21 DIAGNOSIS — Z86.718 HISTORY OF DVT OF LOWER EXTREMITY: ICD-10-CM

## 2021-10-21 DIAGNOSIS — M75.42 IMPINGEMENT SYNDROME OF LEFT SHOULDER: ICD-10-CM

## 2021-10-21 DIAGNOSIS — L30.9 DERMATITIS: ICD-10-CM

## 2021-10-21 DIAGNOSIS — M79.661 PAIN IN RIGHT LOWER LEG: ICD-10-CM

## 2021-10-21 PROCEDURE — 93971 EXTREMITY STUDY: CPT | Mod: RT

## 2021-10-21 PROCEDURE — 99214 OFFICE O/P EST MOD 30 MIN: CPT | Performed by: FAMILY MEDICINE

## 2021-10-21 ASSESSMENT — FIBROSIS 4 INDEX: FIB4 SCORE: 0.77

## 2021-10-21 NOTE — ASSESSMENT & PLAN NOTE
Patient is complaining of a dry scaling rash on the dorsum of the right hand.  He denies any new contacts.

## 2021-10-21 NOTE — PATIENT INSTRUCTIONS
Shoulder Impingement Syndrome    Shoulder impingement syndrome is a condition that causes pain when connective tissues (tendons) surrounding the shoulder joint become pinched. These tendons are part of the group of muscles and tissues that help to stabilize the shoulder (rotator cuff). Beneath the rotator cuff is a fluid-filled sac (bursa) that allows the muscles and tendons to glide smoothly.  The bursa may become swollen or irritated (bursitis). Bursitis, swelling in the rotator cuff tendons, or both conditions can decrease how much space is under a bone in the shoulder joint (acromion), resulting in impingement.  What are the causes?  Shoulder impingement syndrome may be caused by bursitis or swelling of the rotator cuff tendons, which may result from:  · Repetitive overhead arm movements.  · Falling onto the shoulder.  · Weakness in the shoulder muscles.  What increases the risk?  You may be more likely to develop this condition if you:  · Play sports that involve throwing, such as baseball.  · Participate in sports such as tennis, volleyball, and swimming.  · Work as a , goldberg, or .  Some people are also more likely to develop impingement syndrome because of the shape of their acromion bone.  What are the signs or symptoms?  The main symptom of this condition is pain on the front or side of the shoulder. The pain may:  · Get worse when lifting or raising the arm.  · Get worse at night.  · Wake you up from sleeping.  · Feel sharp when the shoulder is moved and then fade to an ache.  Other symptoms may include:  · Tenderness.  · Stiffness.  · Inability to raise the arm above shoulder level or behind the body.  · Weakness.  How is this diagnosed?  This condition may be diagnosed based on:  · Your symptoms and medical history.  · A physical exam.  · Imaging tests, such as:  ? X-rays.  ? MRI.  ? Ultrasound.  How is this treated?  This condition may be treated by:  · Resting your shoulder and  avoiding all activities that cause pain or put stress on the shoulder.  · Icing your shoulder.  · NSAIDs to help reduce pain and swelling.  · One or more injections of medicines to numb the area and reduce inflammation.  · Physical therapy.  · Surgery. This may be needed if nonsurgical treatments have not helped. Surgery may involve repairing the rotator cuff, reshaping the acromion, or removing the bursa.  Follow these instructions at home:  Managing pain, stiffness, and swelling    · If directed, put ice on the injured area.  ? Put ice in a plastic bag.  ? Place a towel between your skin and the bag.  ? Leave the ice on for 20 minutes, 2-3 times a day.  Activity  · Rest and return to your normal activities as told by your health care provider. Ask your health care provider what activities are safe for you.  · Do exercises as told by your health care provider.  General instructions  · Do not use any products that contain nicotine or tobacco, such as cigarettes, e-cigarettes, and chewing tobacco. These can delay healing. If you need help quitting, ask your health care provider.  · Ask your health care provider when it is safe for you to drive.  · Take over-the-counter and prescription medicines only as told by your health care provider.  · Keep all follow-up visits as told by your health care provider. This is important.  How is this prevented?  · Give your body time to rest between periods of activity.  · Be safe and responsible while being active. This will help you avoid falls.  · Maintain physical fitness, including strength and flexibility.  Contact a health care provider if:  · Your symptoms have not improved after 1-2 months of treatment and rest.  · You cannot lift your arm away from your body.  Summary  · Shoulder impingement syndrome is a condition that causes pain when connective tissues (tendons) surrounding the shoulder joint become pinched.  · The main symptom of this condition is pain on the front or  side of the shoulder.  · This condition is usually treated with rest, ice, and pain medicines as needed.  This information is not intended to replace advice given to you by your health care provider. Make sure you discuss any questions you have with your health care provider.  Document Released: 12/18/2006 Document Revised: 04/10/2020 Document Reviewed: 06/12/2019  [employer] Patient Education © 2020 [employer]

## 2021-10-21 NOTE — ASSESSMENT & PLAN NOTE
Patient was seen at [facility] and his x-ray showed a type III anteriolateral acromion.  He is to try more physical therapy to see if that will help.

## 2021-10-21 NOTE — ASSESSMENT & PLAN NOTE
Patient had a pain in the right popliteal space starting on Sunday.  He flew from [place], [place] to [place] with a lay-over in [place] on Saturday.  The first leg of his flight was about an hour and the second was 2-1/4 hours.  He did ambulate in the [place] airport in between.  He has a history of a DVT in the past.  He had been advised to go to the urgent care or emergency room to be evaluated but felt that it was improving.  He took one of the old Xarelto is that he had around from his last DVT but only for 1 day and then resumed aspirin 182 mg.  He did not have any swelling but he just continues to have feeling of pressure.  He denies any shortness of breath or cough.  No chest pain.

## 2021-11-02 NOTE — PROGRESS NOTES
Chief Complaint   Patient presents with   • Follow-Up     3 months.    • Eczema     Right hand. Started 4 days ago.    • Lab Results     X-rays for shoulder        Philippe Hillman is a 45 y.o. male here today for evaluation and management of:    Impingement syndrome of left shoulder  Patient was seen at [place] [facility] and his x-ray showed a type III anteriolateral acromion.  He is to try more physical therapy to see if that will help.    History of DVT of lower extremity  Patient had a pain in the right popliteal space starting on Sunday.  He flew from [place], [place] to [place] with a lay-over in [place] on Saturday.  The first leg of his flight was about an hour and the second was 2-1/4 hours.  He did ambulate in the [place] airport in between.  He has a history of a DVT in the past.  He had been advised to go to the urgent care or emergency room to be evaluated but felt that it was improving.  He took one of the old Xarelto is that he had around from his last DVT but only for 1 day and then resumed aspirin 182 mg.  He did not have any swelling but he just continues to have feeling of pressure.  He denies any shortness of breath or cough.  No chest pain.    Dermatitis  Patient is complaining of a dry scaling rash on the dorsum of the right hand.  He denies any new contacts.       No Known Allergies    Current medicines (including changes today)  Current Outpatient Medications   Medication Sig Dispense Refill   • fluocinonide (LIDEX) 0.05 % Cream Apply small amount to rash BID prn 30 g 1   • allopurinol (ZYLOPRIM) 300 MG Tab Take 1 tablet by mouth every day. 90 tablet 3   • colchicine (COLCRYS) 0.6 MG Tab 2 tabs at onset and one po 8 hours later 20 tablet 1   • indomethacin (INDOCIN) 50 MG Cap Take 1 Cap by mouth 3 times a day. 90 Cap 0     No current facility-administered medications for this visit.       He  has a past medical history of Measles (1983).    Patient Active Problem List    Diagnosis Date  "Noted   • Impingement syndrome of left shoulder 10/21/2021   • Chronic left shoulder pain 02/25/2021   • Family history of dermatomyositis 02/25/2021   • History of DVT of lower extremity 02/09/2021   • Dermatitis 07/09/2020   • Dry eye 08/09/2018   • Chronic idiopathic gout of multiple sites 04/20/2018   • Mallet deformity of right middle finger 08/03/2017   • Hyperglycemia 09/19/2016   • Allergic rhinitis due to pollen 09/12/2016   • High serum low-density lipoprotein (LDL) 09/12/2016       ROS   No fever or chills.  No nausea or vomiting.  No chest pain or palpitations.  No cough or SOB.  No pain with urination or hematuria.  No black or bloody stools.       Objective:     /66 (BP Location: Right arm, Patient Position: Sitting, BP Cuff Size: Adult)   Pulse 98   Temp 36.6 °C (97.9 °F) (Temporal)   Resp 12   Ht 1.702 m (5' 7\")   Wt 67.4 kg (148 lb 9.6 oz)   SpO2 95%  Body mass index is 23.27 kg/m².   Physical Exam:  Well developed, well nourished.  Alert, oriented in no acute distress.  Eye contact is good, speech goal directed, affect calm  Eyes: conjunctiva non-injected, sclera non-icteric.  Neck Supple.  No adenopathy or masses in the neck or supraclavicular regions. No thyromegaly  Lungs: clear to auscultation bilaterally with good excursion. No wheezes or rhonchi  CV: regular rate and rhythm. No murmur  Ext: no edema, color normal, vascularity normal, temperature normal.  Slight tenderness to palpation of the right calf especially in the popliteal area.  Slight pain to dorsiflexion  Skin: Erythematous scaling rash on dorsum of the right hand        Assessment and Plan:   The following treatment plan was discussed    1. Impingement syndrome of left shoulder  Follow-up with orthopedics as scheduled.  Continue physical therapy.  Call if not improving    2. Pain in right lower leg  Given his history of DVT and his recent travel we will get a stat ultrasound of the lower extremity.  Await results.  " Discussed emergency precautions of any shortness of breath or chest pain he should go immediately to the emergency department.  - US-EXTREMITY VENOUS LOWER UNILAT RIGHT; Future    3. History of DVT of lower extremity  Given his history of DVT and his recent travel we will get a stat ultrasound of the lower extremity.  Await results.  Discussed emergency precautions of any shortness of breath or chest pain he should go immediately to the emergency department.  - US-EXTREMITY VENOUS LOWER UNILAT RIGHT; Future    4. Dermatitis  Discussed that this looks allergic.  Lidex cream twice a day until clear.  Moisturize regularly.  Avoid products with dyes or perfumes.  - fluocinonide (LIDEX) 0.05 % Cream; Apply small amount to rash BID prn  Dispense: 30 g; Refill: 1    Any change or worsening of signs or symptoms, patient encouraged to follow-up or report to the emergency room for further evaluation. Patient understands and agrees.    Followup: Return if symptoms worsen or fail to improve.

## 2021-11-04 ENCOUNTER — PHYSICAL THERAPY (OUTPATIENT)
Dept: PHYSICAL THERAPY | Facility: MEDICAL CENTER | Age: 46
End: 2021-11-04
Attending: ORTHOPAEDIC SURGERY
Payer: COMMERCIAL

## 2021-11-04 DIAGNOSIS — M75.02 ADHESIVE CAPSULITIS OF LEFT SHOULDER: ICD-10-CM

## 2021-11-04 PROCEDURE — 97110 THERAPEUTIC EXERCISES: CPT

## 2021-11-04 PROCEDURE — 97163 PT EVAL HIGH COMPLEX 45 MIN: CPT

## 2021-11-04 ASSESSMENT — ENCOUNTER SYMPTOMS: PAIN SCALE: 0

## 2021-11-04 NOTE — OP THERAPY EVALUATION
Outpatient Physical Therapy  INITIAL EVALUATION    [facility]  [address]  [place] [address]  Phone:  [phone]  Fax:  [phone]    Date of Evaluation: 2021    Patient: Philippe Hillman  YOB: 1975  MRN: [id number]     Referring Provider: Denny Gan M.D.  [address] [place] [address]   Referring Diagnosis Adhesive capsulitis of left shoulder [M75.02]     Time Calculation  Start time: 1435  Stop time: 1516 Time Calculation (min): 41 minutes         Chief Complaint: Shoulder Problem    Visit Diagnoses     ICD-10-CM   1. Adhesive capsulitis of left shoulder  M75.02       Date of onset of impairment: No data found    Subjective:   History of Present Illness:     Mechanism of injury:  Patient is a 45 year old male with a PMH including: measles and LASIX eye surgery.    Pt is known to this author and was seen previously for L frozen shoulder which initiated in 2021; seen in OP PT 3/26/21-21 and DC-ed due to no functional and objective changes despite compliance to hep. Pt returning today and reports feels his range of motion has improved on the L. He was seen by an orthopedist who offered Cortizone injection in 2 months if unsuccessful with second round of therapy. Additionally, X rays were completed showing type III acromion on L. Pt admitting not compliant with most hep and has been working on 2-3 exercises very intermittently. Pt otherwise denying major changes in L shoulders since last seen.        Prior level of function:  Works for IT at [employer]  Pain:     Current pain ratin    Location:  L global shoulder    Quality:  Aching    Progression:  Stable    Pain Comments::  Aggravating: elevation, all shoulder AROM to end range    Relieving: resting  Activities of Daily Living:     Patient reported ADL status: Patient's current daily routine includes:  Work: [employer]  Hobbies:  Tennis      Patient Goals:     Other patient goals:  Increased ROM; avoid injection if possible      Past Medical History:   Diagnosis Date   • Measles 1983    in [place]     Past Surgical History:   Procedure Laterality Date   • EYE SURGERY      LASIX     Social History     Tobacco Use   • Smoking status: Never Smoker   • Smokeless tobacco: Never Used   Substance Use Topics   • Alcohol use: Yes     Comment: once a month     Family and Occupational History     Socioeconomic History   • Marital status: Single     Spouse name: Not on file   • Number of children: Not on file   • Years of education: Not on file   • Highest education level: Not on file   Occupational History   • Not on file       Objective     Static Posture     Comments  Per eval 3/26/21:  AROM left shoulder 110 degrees, abduction 92 degrees, HBB wrist to L5, ER 34 degrees.    Eval today 11/4/21:  Shoulder AROM:   R 145 flexion (104 L)  R WFL abd (76 L)  R 83 ER (42 L)  Hand BTH R: T7 (T12 L)   30 deg extension L    Shoulder PROM:  125 in hooklying L flexion (125 deg L flexion)    GH isometrics: ( set up: 0 deg GH abd and elbow 90 deg)    MMT isometrics WFL R and 4-/5 L              Therapeutic Exercises (CPT 33777):     1. Review of hep-see below, pt admitting not completing stretches and old hep    2. Pt education, re: heating prior to stretching; no aggressive stretching or pain with or following exercises      Therapeutic Exercise Summary:     Therapeutic Exercise Summary: Access Code: [email or web address]  URL: [email or web address]  Date: 03/26/2021  Prepared by: Candice Madden     Exercises  •Supine Shoulder Flexion AAROM with Hands Clasped - 2 x daily - 7 x weekly - 1 sets - 10 reps  •Supine Shoulder External Rotation AAROM with Dowel - 2 x daily - 7 x weekly - 1 sets - 10 reps  •Seated Shoulder External Rotation AAROM with Dowel - 2 x daily - 7 x weekly - 1 sets - 10 reps  •Standing Shoulder Extension with Dowel - 1 x daily - 7 x weekly - 10 reps - 3  sets  •Hip Hinge Rock Back - 1 x daily - 7 x weekly - 10 reps - 3 sets      Time-based treatments/modalities:    Physical Therapy Timed Treatment Charges  Therapeutic exercise minutes (CPT 43090): 21 minutes      Assessment, Response and Plan:   Impairments: abnormal or restricted ROM, impaired physical strength, lacks appropriate home exercise program, limited mobility and pain with function    Assessment details:  Patient is a pleasant and cooperative 45 year old male who is known to this provider for diagnosis of L frozen shoulder; he was previously DC-ed due to no objective and functional changes while in therapy. He returns to therapy today with some increased ROM in few but not all planes. Strength has minimally decreased since [facility]. He was seen by orthopedist who did offer injection but pt attempting to avoid this if possible; reports will follow up with this provider in 2 months if therapy is unsuccessful. Symptoms are low irritability with only occasional discomfort with sleeping, no pain at rest. L shoulder AROM =PROM. Pt may benefit from skilled physical therapy in order to address above impairments in order to improve QOL and return to reported ADL's.     Prognosis: fair    Goals:   Short Term Goals:   Pt will be compliant with daily HEP.   Pt will have 15% increased mobiity in 2 weeks.     Short term goal time span:  2-4 weeks      Long Term Goals:    Pt will be able to sleep on his side with 50% or greater reduction pain to improve ADL's.  Pt will be able to play tennis with 50% or greater reduction pain for improved QOL.   Pt will demonstrate 30% or greater ROM improvements into elevation.   Quickdash score will improve 50%. (eval: 15.91)    Long term goal time span:  6-8 weeks    Plan:   Therapy options:  Physical therapy treatment to continue  Planned therapy interventions:  Neuromuscular Re-education (CPT 87798), E Stim Unattended (CPT 31461), Manual Therapy (CPT 81060) and  Therapeutic Exercise (CPT 82381)  Frequency: 1-2x/wk.  Duration in weeks:  8  Discussed with:  Patient  Plan details:  UPOC: 12/31/21          Functional Assessment Used  Quickdash General Total Score: 15.91     Referring provider co-signature:  I have reviewed this plan of care and my co-signature certifies the need for services.    Certification Period: 11/04/2021 to 12/31/21    Physician Signature: ________________________________ Date: ______________

## 2021-11-05 ASSESSMENT — ENCOUNTER SYMPTOMS: QUALITY: ACHING

## 2021-11-08 ENCOUNTER — HOSPITAL ENCOUNTER (OUTPATIENT)
Dept: LAB | Facility: MEDICAL CENTER | Age: 46
End: 2021-11-08
Attending: FAMILY MEDICINE
Payer: COMMERCIAL

## 2021-11-08 ENCOUNTER — OFFICE VISIT (OUTPATIENT)
Dept: MEDICAL GROUP | Facility: LAB | Age: 46
End: 2021-11-08
Payer: COMMERCIAL

## 2021-11-08 VITALS
HEIGHT: 67 IN | TEMPERATURE: 97.8 F | DIASTOLIC BLOOD PRESSURE: 72 MMHG | HEART RATE: 88 BPM | SYSTOLIC BLOOD PRESSURE: 100 MMHG | OXYGEN SATURATION: 98 % | WEIGHT: 146 LBS | BODY MASS INDEX: 22.91 KG/M2 | RESPIRATION RATE: 12 BRPM

## 2021-11-08 DIAGNOSIS — M1A.09X0 IDIOPATHIC CHRONIC GOUT OF MULTIPLE SITES WITHOUT TOPHUS: ICD-10-CM

## 2021-11-08 DIAGNOSIS — R73.03 PREDIABETES: ICD-10-CM

## 2021-11-08 DIAGNOSIS — M25.521 RIGHT ELBOW PAIN: ICD-10-CM

## 2021-11-08 DIAGNOSIS — Z23 NEED FOR VACCINATION: ICD-10-CM

## 2021-11-08 LAB
EST. AVERAGE GLUCOSE BLD GHB EST-MCNC: 117 MG/DL
HBA1C MFR BLD: 5.7 % (ref 4–5.6)
URATE SERPL-MCNC: 6.2 MG/DL (ref 2.5–8.3)

## 2021-11-08 PROCEDURE — 90471 IMMUNIZATION ADMIN: CPT | Performed by: FAMILY MEDICINE

## 2021-11-08 PROCEDURE — 84550 ASSAY OF BLOOD/URIC ACID: CPT

## 2021-11-08 PROCEDURE — 83036 HEMOGLOBIN GLYCOSYLATED A1C: CPT

## 2021-11-08 PROCEDURE — 36415 COLL VENOUS BLD VENIPUNCTURE: CPT

## 2021-11-08 PROCEDURE — 90686 IIV4 VACC NO PRSV 0.5 ML IM: CPT | Performed by: FAMILY MEDICINE

## 2021-11-08 PROCEDURE — 99214 OFFICE O/P EST MOD 30 MIN: CPT | Mod: 25 | Performed by: FAMILY MEDICINE

## 2021-11-08 RX ORDER — FEBUXOSTAT 40 MG/1
40 TABLET, FILM COATED ORAL DAILY
Qty: 30 TABLET | Refills: 1 | Status: SHIPPED | OUTPATIENT
Start: 2021-11-08 | End: 2023-05-02

## 2021-11-08 ASSESSMENT — FIBROSIS 4 INDEX: FIB4 SCORE: 0.77

## 2021-11-08 NOTE — PATIENT INSTRUCTIONS
Low-Purine Eating Plan  A low-purine eating plan involves making food choices to limit your intake of purine. Purine is a kind of uric acid. Too much uric acid in your blood can cause certain conditions, such as gout and kidney stones. Eating a low-purine diet can help control these conditions.  What are tips for following this plan?  Reading food labels    Avoid foods with saturated or Trans fat.  Check the ingredient list of grains-based foods, such as bread and cereal, to make sure that they contain whole grains.  Check the ingredient list of sauces or soups to make sure they do not contain meat or fish.  When choosing soft drinks, check the ingredient list to make sure they do not contain high-fructose corn syrup.  Shopping  Buy plenty of fresh fruits and vegetables.  Avoid buying canned or fresh fish.  Buy dairy products labeled as low-fat or nonfat.  Avoid buying premade or processed foods. These foods are often high in fat, salt (sodium), and added sugar.  Cooking  Use olive oil instead of butter when cooking. Oils like olive oil, canola oil, and sunflower oil contain healthy fats.  Meal planning  Learn which foods do or do not affect you. If you find out that a food tends to cause your gout symptoms to flare up, avoid eating that food. You can enjoy foods that do not cause problems. If you have any questions about a food item, talk with your dietitian or health care provider.  Limit foods high in fat, especially saturated fat. Fat makes it harder for your body to get rid of uric acid.  Choose foods that are lower in fat and are lean sources of protein.  General guidelines  Limit alcohol intake to no more than 1 drink a day for nonpregnant women and 2 drinks a day for men. One drink equals 12 oz of beer, 5 oz of wine, or 1½ oz of hard liquor. Alcohol can affect the way your body gets rid of uric acid.  Drink plenty of water to keep your urine clear or pale yellow. Fluids can help remove uric acid from your  body.  If directed by your health care provider, take a vitamin C supplement.  Work with your health care provider and dietitian to develop a plan to achieve or maintain a healthy weight. Losing weight can help reduce uric acid in your blood.  What foods are recommended?  The items listed may not be a complete list. Talk with your dietitian about what dietary choices are best for you.  Foods low in purines  Foods low in purines do not need to be limited. These include:  All fruits.  All low-purine vegetables, pickles, and olives.  Breads, pasta, rice, cornbread, and popcorn. Cake and other baked goods.  All dairy foods.  Eggs, nuts, and nut butters.  Spices and condiments, such as salt, herbs, and vinegar.  Plant oils, butter, and margarine.  Water, sugar-free soft drinks, tea, coffee, and cocoa.  Vegetable-based soups, broths, sauces, and gravies.  Foods moderate in purines  Foods moderate in purines should be limited to the amounts listed.  ½ cup of asparagus, cauliflower, spinach, mushrooms, or green peas, each day.  2/3 cup uncooked oatmeal, each day.  ¼ cup dry wheat bran or wheat germ, each day.  2-3 ounces of meat or poultry, each day.  4-6 ounces of shellfish, such as crab, lobster, oysters, or shrimp, each day.  1 cup cooked beans, peas, or lentils, each day.  Soup, broths, or bouillon made from meat or fish. Limit these foods as much as possible.  What foods are not recommended?  The items listed may not be a complete list. Talk with your dietitian about what dietary choices are best for you.  Limit your intake of foods high in purines, including:  Beer and other alcohol.  Meat-based gravy or sauce.  Canned or fresh fish, such as:  Anchovies, sardines, herring, and tuna.  Mussels and scallops.  Codfish, trout, and raffy.  Bruner.  Organ meats, such as:  Liver or kidney.  Tripe.  Sweetbreads (thymus gland or pancreas).  Wild game or goose.  Yeast or yeast extract supplements.  Drinks sweetened with  high-fructose corn syrup.  Summary  Eating a low-purine diet can help control conditions caused by too much uric acid in the body, such as gout or kidney stones.  Choose low-purine foods, limit alcohol, and limit foods high in fat.  You will learn over time which foods do or do not affect you. If you find out that a food tends to cause your gout symptoms to flare up, avoid eating that food.  This information is not intended to replace advice given to you by your health care provider. Make sure you discuss any questions you have with your health care provider.  Document Released: 04/13/2012 Document Revised: 11/30/2018 Document Reviewed: 01/31/2018  [employer] Patient Education © 2020 [employer]  Gout    Gout is a condition that causes painful swelling of the joints. Gout is a type of inflammation of the joints (arthritis). This condition is caused by having too much uric acid in the body. Uric acid is a chemical that forms when the body breaks down substances called purines. Purines are important for building body proteins.  When the body has too much uric acid, sharp crystals can form and build up inside the joints. This causes pain and swelling. Gout attacks can happen quickly and may be very painful (acute gout). Over time, the attacks can affect more joints and become more frequent (chronic gout). Gout can also cause uric acid to build up under the skin and inside the kidneys.  What are the causes?  This condition is caused by too much uric acid in your blood. This can happen because:  · Your kidneys do not remove enough uric acid from your blood. This is the most common cause.  · Your body makes too much uric acid. This can happen with some cancers and cancer treatments. It can also occur if your body is breaking down too many red blood cells (hemolytic anemia).  · You eat too many foods that are high in purines. These foods include organ meats and some seafood. Alcohol, especially beer, is also high in  purines.  A gout attack may be triggered by trauma or stress.  What increases the risk?  You are more likely to develop this condition if you:  · Have a family history of gout.  · Are male and middle-aged.  · Are female and have gone through menopause.  · Are obese.  · Frequently drink alcohol, especially beer.  · Are dehydrated.  · Lose weight too quickly.  · Have an organ transplant.  · Have lead poisoning.  · Take certain medicines, including aspirin, cyclosporine, diuretics, levodopa, and niacin.  · Have kidney disease.  · Have a skin condition called psoriasis.  What are the signs or symptoms?  An attack of acute gout happens quickly. It usually occurs in just one joint. The most common place is the big toe. Attacks often start at night. Other joints that may be affected include joints of the feet, ankle, knee, fingers, wrist, or elbow. Symptoms of this condition may include:  · Severe pain.  · Warmth.  · Swelling.  · Stiffness.  · Tenderness. The affected joint may be very painful to touch.  · Shiny, red, or purple skin.  · Chills and fever.  Chronic gout may cause symptoms more frequently. More joints may be involved. You may also have white or yellow lumps (tophi) on your hands or feet or in other areas near your joints.  How is this diagnosed?  This condition is diagnosed based on your symptoms, medical history, and physical exam. You may have tests, such as:  · Blood tests to measure uric acid levels.  · Removal of joint fluid with a thin needle (aspiration) to look for uric acid crystals.  · X-rays to look for joint damage.  How is this treated?  Treatment for this condition has two phases: treating an acute attack and preventing future attacks. Acute gout treatment may include medicines to reduce pain and swelling, including:  · NSAIDs.  · Steroids. These are strong anti-inflammatory medicines that can be taken by mouth (orally) or injected into a joint.  · Colchicine. This medicine relieves pain and  swelling when it is taken soon after an attack. It can be given by mouth or through an IV.  Preventive treatment may include:  · Daily use of smaller doses of NSAIDs or colchicine.  · Use of a medicine that reduces uric acid levels in your blood.  · Changes to your diet. You may need to see a dietitian about what to eat and drink to prevent gout.  Follow these instructions at home:  During a gout attack    · If directed, put ice on the affected area:  ? Put ice in a plastic bag.  ? Place a towel between your skin and the bag.  ? Leave the ice on for 20 minutes, 2-3 times a day.  · Raise (elevate) the affected joint above the level of your heart as often as possible.  · Rest the joint as much as possible. If the affected joint is in your leg, you may be given crutches to use.  · Follow instructions from your health care provider about eating or drinking restrictions.  Avoiding future gout attacks  · Follow a low-purine diet as told by your dietitian or health care provider. Avoid foods and drinks that are high in purines, including liver, kidney, anchovies, asparagus, herring, mushrooms, mussels, and beer.  · Maintain a healthy weight or lose weight if you are overweight. If you want to lose weight, talk with your health care provider. It is important that you do not lose weight too quickly.  · Start or maintain an exercise program as told by your health care provider.  Eating and drinking  · Drink enough fluids to keep your urine pale yellow.  · If you drink alcohol:  ? Limit how much you use to:  § 0-1 drink a day for women.  § 0-2 drinks a day for men.  ? Be aware of how much alcohol is in your drink. In the [place] one drink equals one 12 oz bottle of beer (355 mL) one 5 oz glass of wine (148 mL), or one 1½ oz glass of hard liquor (44 mL).  General instructions  · Take over-the-counter and prescription medicines only as told by your health care provider.  · Do not drive or use heavy machinery while taking  prescription pain medicine.  · Return to your normal activities as told by your health care provider. Ask your health care provider what activities are safe for you.  · Keep all follow-up visits as told by your health care provider. This is important.  Contact a health care provider if you have:  · Another gout attack.  · Continuing symptoms of a gout attack after 10 days of treatment.  · Side effects from your medicines.  · Chills or a fever.  · Burning pain when you urinate.  · Pain in your lower back or belly.  Get help right away if you:  · Have severe or uncontrolled pain.  · Cannot urinate.  Summary  · Gout is painful swelling of the joints caused by inflammation.  · The most common site of pain is the big toe, but it can affect other joints in the body.  · Medicines and dietary changes can help to prevent and treat gout attacks.  This information is not intended to replace advice given to you by your health care provider. Make sure you discuss any questions you have with your health care provider.  Document Released: 12/15/2001 Document Revised: 07/10/2019 Document Reviewed: 07/10/2019  [employer] Patient Education © 2020 [employer]  Febuxostat oral tablets  What is this medicine?  FEBUXOSTAT (feb UX oh stat) is used to treat gout. People with gout have too much uric acid in their body. This medicine works to lower how much uric acid the body makes.  This medicine may be used for other purposes; ask your health care provider or pharmacist if you have questions.  COMMON BRAND NAME(S): Uloric  What should I tell my health care provider before I take this medicine?  They need to know if you have any of these conditions:  · heart disease  · history of heart attack or stroke  · kidney disease  · liver disease  · taking azathioprine or mercaptopurine  · an unusual or allergic reaction to febuxostat, other medicines, foods, dyes, or preservatives  · pregnant or trying to get pregnant  · breast feeding  How should I  use this medicine?  Take this medicine by mouth with a glass of water. Follow the directions on the prescription label. You can take it with or without food. Take your medicine at regular intervals. Do not take it more often than directed. Do not stop taking except on your doctor's advice.  Talk to your pediatrician regarding the use of this medicine in children. This medicine is not approved for use in children.  Overdosage: If you think you have taken too much of this medicine contact a poison control center or emergency room at once.  NOTE: This medicine is only for you. Do not share this medicine with others.  What if I miss a dose?  If you miss a dose, take it as soon as you can. If it is almost time for your next dose, take only that dose. Do not take double or extra doses.  What may interact with this medicine?  Do not take this medicine with any of the following medications:  · azathioprine  · mercaptopurine  This medicine may also interact with the following medications:  · aminophylline  · certain medicines used to treat cancer  · pegloticase  · rasburicase  · theophylline  This list may not describe all possible interactions. Give your health care provider a list of all the medicines, herbs, non-prescription drugs, or dietary supplements you use. Also tell them if you smoke, drink alcohol, or use illegal drugs. Some items may interact with your medicine.  What should I watch for while using this medicine?  Visit your healthcare provider for regular checks on your progress. Tell your healthcare professional if your symptoms do not start to get better or if they get worse. You will need regular blood tests while you are taking this medicine.  Your gout may flare up when you are first taking this medicine. Do not stop taking this medicine, even if you have a flare. Your doctor or healthcare provider may give you another medicine to help treat the gout pain.  This medicine may cause serious skin reactions.  They can happen weeks to months after starting the medicine. Contact your healthcare provider right away if you notice fevers or flu-like symptoms with a rash. The rash may be red or purple and then turn into blisters or peeling of the skin. Or, you might notice a red rash with swelling of the face, lips, or lymph nodes in your neck or under your arms.  What side effects may I notice from receiving this medicine?  Side effects that you should report to your doctor or health care professional as soon as possible:  · allergic reactions like skin rash, itching or hives, swelling of the face, lips, or tongue  · breathing problems  · chest pain or chest tightness  · fast, irregular heartbeat  · feeling faint or lightheaded  · rash, fever, and swollen lymph nodes  · redness, blistering, peeling, or loosening of the skin, including inside the mouth  · signs and symptoms of liver injury like dark yellow or brown urine; general ill feeling or flu-like symptoms; light-colored stools; loss of appetite; nausea; right upper belly pain; unusually weak or tired; yellowing of the eyes or skin  · signs and symptoms of a stroke like changes in vision; confusion; trouble speaking or understanding; severe headaches; sudden numbness or weakness of the face, arm or leg; trouble walking; dizziness; loss of balance or coordination  Side effects that usually do not require medical attention (report to your doctor or health care professional if they continue or are bothersome):  · changes in appetite  · joint pain  · nausea, vomiting  · upset stomach  This list may not describe all possible side effects. Call your doctor for medical advice about side effects. You may report side effects to FDA at 7-881-FDA-1808.  Where should I keep my medicine?  Keep out of the reach of children.  Store at room temperature between 15 and 30 degrees C (59 and 86 degrees F). Protect from light and moisture. Throw away any unused medicine after the expiration  date.  NOTE: This sheet is a summary. It may not cover all possible information. If you have questions about this medicine, talk to your doctor, pharmacist, or health care provider.  © 2020 [employer] (2020-03-19 14:09:48)

## 2021-11-09 ENCOUNTER — PHYSICAL THERAPY (OUTPATIENT)
Dept: PHYSICAL THERAPY | Facility: MEDICAL CENTER | Age: 46
End: 2021-11-09
Attending: ORTHOPAEDIC SURGERY
Payer: COMMERCIAL

## 2021-11-09 DIAGNOSIS — M75.02 ADHESIVE CAPSULITIS OF LEFT SHOULDER: ICD-10-CM

## 2021-11-09 PROCEDURE — 97140 MANUAL THERAPY 1/> REGIONS: CPT

## 2021-11-09 PROCEDURE — 97110 THERAPEUTIC EXERCISES: CPT

## 2021-11-09 PROCEDURE — 97014 ELECTRIC STIMULATION THERAPY: CPT

## 2021-11-09 NOTE — OP THERAPY DAILY TREATMENT
Outpatient Physical Therapy  DAILY TREATMENT     [facility] Outpatient Physical Therapy  [address]  [place] [address]  Phone:  [phone]  Fax:  [phone]    Date: 11/09/2021    Patient: Philippe Hillman  YOB: 1975  MRN: [id number]     Time Calculation    Start time: 1432  Stop time: 1528 Time Calculation (min): 56 minutes         Chief Complaint: Shoulder Problem    Visit #: 2    SUBJECTIVE:  Last night I couldn't sleep so I took ibuprofen. I was told I have gout in my elbow.     OBJECTIVE:  Current objective measures:       Shoulder AROM pre treatment:   L flexion: 104   L abduction: 72 (42 at eval)  L GH ER: 52 (at eval: 42)     Shoulder AROM post treatment:    Therapeutic Exercises (CPT 66463):     1. Review of hep-see below, pt admitting not completing stretches and old hep    2. Pt education, re: heating prior to stretching; no aggressive stretching or pain with or following exercises    3. Pulleys, GH flexion seated x3 min, cardiovascular warm up      Therapeutic Exercise Summary:     Therapeutic Exercise Summary: Access Code: [email or web address]  URL: [email or web address]  Date: 03/26/2021  Prepared by: Candice Madden     Exercises  •Supine Shoulder Flexion AAROM with Hands Clasped - 2 x daily - 7 x weekly - 1 sets - 10 reps  •Supine Shoulder External Rotation AAROM with Dowel - 2 x daily - 7 x weekly - 1 sets - 10 reps  •Seated Shoulder External Rotation AAROM with Dowel - 2 x daily - 7 x weekly - 1 sets - 10 reps -reviewed with towel at axilla  •Standing Shoulder Extension with Dowel - 1 x daily - 7 x weekly - 10 reps - 3 sets  •Hip Hinge Rock Back - 1 x daily - 7 x weekly - 10 reps - 3 sets    Therapeutic Treatments and Modalities:     1. Manual Therapy (CPT 82749), see below    2. E Stim Unattended (CPT 91794), IFC and mhp to L shoulder x 15 min (cold pack added to L elbow)    Therapeutic Treatment and Modalities Summary: Manual: Flexion and abd  and ER stretch (at side and at 90 deg)  GH PA's and inferior glides grade III with increasing GH elevation and ER with pt in supine.      Time-based treatments/modalities:    Physical Therapy Timed Treatment Charges  Manual therapy minutes (CPT 07032): 26 minutes  Therapeutic exercise minutes (CPT 29427): 15 minutes      Pain rating (1-10) before treatment:  0; R elbow 3/10  Pain rating (1-10) after treatment:  0      ASSESSMENT:   Response to treatment:   Pt presenting to therapy with mod carryover of initial hep-only requiring min cuing for positioning and towel at axilla; reports is currently compliant to hep. Of note, was told he has gout at R elbow and is currently being treated by other provider. Symptoms currently low irritability, however, re-edu pt re: no increase in pain at home with stretching and mobility exercises. Pt demonstrating some positive improvements within session today to manual and stretching; will continue with these treatments in subsequent sessions.    PLAN/RECOMMENDATIONS:   Plan for treatment: therapy treatment to continue next visit.  Planned interventions for next visit: continue with current treatment.  Continue manual; review scapular setting and correct scapulohumeral rhythm

## 2021-11-11 ENCOUNTER — PHYSICAL THERAPY (OUTPATIENT)
Dept: PHYSICAL THERAPY | Facility: MEDICAL CENTER | Age: 46
End: 2021-11-11
Attending: ORTHOPAEDIC SURGERY
Payer: COMMERCIAL

## 2021-11-11 DIAGNOSIS — M75.02 ADHESIVE CAPSULITIS OF LEFT SHOULDER: ICD-10-CM

## 2021-11-11 PROCEDURE — 97140 MANUAL THERAPY 1/> REGIONS: CPT

## 2021-11-11 PROCEDURE — 97110 THERAPEUTIC EXERCISES: CPT

## 2021-11-11 PROCEDURE — 97014 ELECTRIC STIMULATION THERAPY: CPT

## 2021-11-11 NOTE — OP THERAPY DAILY TREATMENT
Outpatient Physical Therapy  DAILY TREATMENT     [facility] Outpatient Physical Therapy  [address]  [place] [address]  Phone:  [phone]  Fax:  [phone]    Date: 11/11/2021    Patient: Philippe Hillman  YOB: 1975  MRN: [id number]     Time Calculation    Start time: 1521  Stop time: 1617 Time Calculation (min): 56 minutes         Chief Complaint: Shoulder Problem    Visit #: 9  *Pt late to appt    SUBJECTIVE:  Sorry I was a little late. We have a water heater problem.    OBJECTIVE:  Current objective measures:       Shoulder AROM pre treatment:   L flexion: 95  L abduction: 75 (42 at eval)  L GH ER: 54 (at eval: 42)     Shoulder AROM post treatment:    Therapeutic Exercises (CPT 15794):     1. Review of hep-see below    3. Pulleys, GH flexion seated x3 min, cardiovascular warm up    4. Pt education, re: no UT hiking with shoulder elevation    6. Shoulder extension  with staff, 15x in standing    7. GH IR BTB with belt, x10, pt reporting increase in discomfort    8. GH extension+adduction, x10, no increase pain; VC's to avoid bending elbow    9. Shoulder abduction with staff, x10, VC's to avoid bending elbow      Therapeutic Exercise Summary:     Therapeutic Exercise Summary: Access Code: [email or web address]  URL: [email or web address]  Date: 03/26/2021  Prepared by: Candice Madden     Exercises  •Supine Shoulder Flexion AAROM with Hands Clasped - 2 x daily - 7 x weekly - 1 sets - 10 reps  •Supine Shoulder External Rotation AAROM with Dowel - 2 x daily - 7 x weekly - 1 sets - 10 reps  •Seated Shoulder External Rotation AAROM with Dowel - 2 x daily - 7 x weekly - 1 sets - 10 reps -reviewed with towel at axilla  •Standing Shoulder Extension with Dowel - 1 x daily - 7 x weekly - 10 reps - 3 sets  •Hip Hinge Rock Back - 1 x daily - 7 x weekly - 10 reps - 3 sets    Therapeutic Treatments and Modalities:     1. Manual Therapy (CPT 32729), see below    2. E Stim  Unattended (CPT 66371), IFC and mhp to L shoulder x 15 min (cold pack added to L elbow)    Therapeutic Treatment and Modalities Summary: Manual: GH PA's and inferior glides grade III with increasing GH elevation and ER with pt in supine. L scapular mobs all directions Grade III-IV including upward rotation and STM to levator scap. //pt reporting pain-free manual.        Time-based treatments/modalities:    Physical Therapy Timed Treatment Charges  Manual therapy minutes (CPT 03497): 15 minutes  Therapeutic exercise minutes (CPT 32859): 26 minutes      Pain rating (1-10) before treatment:  0; R elbow 3/10  Pain rating (1-10) after treatment:  0      ASSESSMENT:   Response to treatment:   Pt reporting will send message to PCP to confirm gout to elbow as he feels he has been 'misdiagnosed' as symptoms are different in comparison to his hx of heel gout.  Pt demonstrating improvements in shoulder ROM that are maintained from last session with sig improvements compared to eval. Pt education today to avoid UT hiking and improve scapulohumeral rhythm with manual to scapular. Difficulty BTB due to pain at anterior shoulder with some improvements with isolated movements (ex: shoulder ext and adduction). Will introduce strengthening to tolerance in subsequent session to accompany AROM and stretching exercises.     PLAN/RECOMMENDATIONS:   Plan for treatment: therapy treatment to continue next visit.  Planned interventions for next visit: continue with current treatment.  Continue manual; review scapular setting and correct scapulohumeral rhythm

## 2021-11-16 ENCOUNTER — PHYSICAL THERAPY (OUTPATIENT)
Dept: PHYSICAL THERAPY | Facility: MEDICAL CENTER | Age: 46
End: 2021-11-16
Attending: ORTHOPAEDIC SURGERY
Payer: COMMERCIAL

## 2021-11-16 DIAGNOSIS — M75.02 ADHESIVE CAPSULITIS OF LEFT SHOULDER: ICD-10-CM

## 2021-11-16 PROCEDURE — 97110 THERAPEUTIC EXERCISES: CPT

## 2021-11-16 PROCEDURE — 97140 MANUAL THERAPY 1/> REGIONS: CPT

## 2021-11-16 PROCEDURE — 97014 ELECTRIC STIMULATION THERAPY: CPT

## 2021-11-16 NOTE — OP THERAPY DAILY TREATMENT
Outpatient Physical Therapy  DAILY TREATMENT     [facility] Outpatient Physical Therapy  [address]  [place] [address]  Phone:  [phone]  Fax:  [phone]    Date: 11/16/2021    Patient: Philippe Hillman  YOB: 1975  MRN: [id number]     Time Calculation    Start time: 1430  Stop time: 1532 Time Calculation (min): 62 minutes         Chief Complaint: Shoulder Problem    Visit #: 4    SUBJECTIVE:  Sorry I was a little late. We have a water heater problem.    OBJECTIVE:  Current objective measures:       Shoulder AROM pre treatment:   L flexion: 100  L abduction: 77 (42 at eval)  L GH ER: 50 (at eval: 42)     Compensations using t/s extension    Shoulder AROM post treatment:   L flexion: 112  L abduction: 90 (42 at eval)  L GH ER: 54 (at eval: 42)      Therapeutic Exercises (CPT 71795):     1. Review of hep-see below    3. Pulleys, GH flexion seated x3 min, cardiovascular warm up    4. Pt education, re: no UT hiking with shoulder elevation    5. Sleeper stretch , 30 sec x 2, hep    6. T/s       Therapeutic Exercise Summary:     Therapeutic Exercise Summary: Access Code: [email or web address]  URL: [email or web address]  Date: 03/26/2021  Prepared by: Candice Madden     Exercises  •Supine Shoulder Flexion AAROM with Hands Clasped - 2 x daily - 7 x weekly - 1 sets - 10 reps  •Supine Shoulder External Rotation AAROM with Dowel - 2 x daily - 7 x weekly - 1 sets - 10 reps  •Seated Shoulder External Rotation AAROM with Dowel - 2 x daily - 7 x weekly - 1 sets - 10 reps -reviewed with towel at axilla  •Standing Shoulder Extension with Dowel - 1 x daily - 7 x weekly - 10 reps - 3 sets  •Hip Hinge Rock Back - 1 x daily - 7 x weekly - 10 reps - 3 sets    Therapeutic Treatments and Modalities:     1. Manual Therapy (CPT 30116), see below    2. E Stim Unattended (CPT 17940), IFC and mhp to L shoulder x 15 min (cold pack added to L elbow)    Therapeutic Treatment and Modalities  Summary: Manual: GH PA's and inferior glides grade III with increasing GH elevation and ER with pt in supine. L scapular mobs all directions Grade III-IV including upward rotation and STM to levator scap. //pt reporting pain-free manual.        Time-based treatments/modalities:    Physical Therapy Timed Treatment Charges  Manual therapy minutes (CPT 73043): 14 minutes  Therapeutic exercise minutes (CPT 13105): 33 minutes      Pain rating (1-10) before treatment:  0; R elbow 3/10  Pain rating (1-10) after treatment:  0      ASSESSMENT:   Response to treatment:   Sig improvements following t/s mobilizations (updated to hep) with good impact on shoulder ROM (see above). Pt will likely continue to benefit from further incorporation of t/s mobilization activities in subsequent sessions.      PLAN/RECOMMENDATIONS:   Plan for treatment: therapy treatment to continue next visit.  Planned interventions for next visit: continue with current treatment.  Add t/s mobs.

## 2021-11-17 ENCOUNTER — PHARMACY VISIT (OUTPATIENT)
Dept: PHARMACY | Facility: MEDICAL CENTER | Age: 46
End: 2021-11-17
Payer: COMMERCIAL

## 2021-11-17 PROCEDURE — RXMED WILLOW AMBULATORY MEDICATION CHARGE: Performed by: INTERNAL MEDICINE

## 2021-11-17 RX ORDER — CX-024414 0.2 MG/ML
0.25 INJECTION, SUSPENSION INTRAMUSCULAR
Qty: 0.25 ML | Refills: 0 | OUTPATIENT
Start: 2021-11-17

## 2021-11-18 ENCOUNTER — PHYSICAL THERAPY (OUTPATIENT)
Dept: PHYSICAL THERAPY | Facility: MEDICAL CENTER | Age: 46
End: 2021-11-18
Attending: ORTHOPAEDIC SURGERY
Payer: COMMERCIAL

## 2021-11-18 ENCOUNTER — PATIENT MESSAGE (OUTPATIENT)
Dept: MEDICAL GROUP | Facility: LAB | Age: 46
End: 2021-11-18

## 2021-11-18 DIAGNOSIS — M75.02 ADHESIVE CAPSULITIS OF LEFT SHOULDER: ICD-10-CM

## 2021-11-18 DIAGNOSIS — M25.521 RIGHT ELBOW PAIN: ICD-10-CM

## 2021-11-18 PROCEDURE — 97110 THERAPEUTIC EXERCISES: CPT

## 2021-11-18 PROCEDURE — 97014 ELECTRIC STIMULATION THERAPY: CPT

## 2021-11-18 NOTE — OP THERAPY DAILY TREATMENT
Outpatient Physical Therapy  DAILY TREATMENT     [facility] Outpatient Physical Therapy  [address]  [place] [address]  Phone:  [phone]  Fax:  [phone]    Date: 11/18/2021    Patient: Philippe Hillman  YOB: 1975  MRN: [id number]     Time Calculation    Start time: 1517  Stop time: 1614 Time Calculation (min): 57 minutes         Chief Complaint: Shoulder Problem    Visit #: 10    SUBJECTIVE:  I wasn't too sore after the last session. I wasn't able to do the exercises       OBJECTIVE:  Current objective measures:   Shoulder AROM from 11/16/21:   L flexion: 112  L abduction: 90 (42 at eval)  L GH ER: 54 (at eval: 42)      Shoulder AROM pre-treatment today:   L flexion: 105  L abduction: 80 (42 at eval)  L GH ER: 44 (at eval: 42)     Shoulder AROM post-treatment today:   L flexion: 105  L abduction: 86 (42 at eval)  L GH ER: 49 (at eval: 42)         Therapeutic Exercises (CPT 21090):     1. Review of hep-see below    3. Pulleys GH flexion and IR, GH flexion seated x3 min, cardiovascular warm up    4. Pt eduction, re: importance of t/s mobility for shoulder AROM    5. Sleeper stretch , 30 sec x 2, hep    6. T/s rotation, x10 ea, reviewed with instructions for correct set up; pt admitting not compliant with hep    7. Thread the needle, 10x (5x ea), limited motion L; hep    9. REIL, x15, visible LUE juddering; pain-free; hep    10. T/s extension over FR , 15x      Therapeutic Exercise Summary: Pt performed these exercises with instruction and SPV.  Provided handout with these exercises for daily HEP.  See media     Therapeutic Treatments and Modalities:     2. E Stim Unattended (CPT 74198), IFC and mhp to L shoulder x 15 min (cold pack added to L elbow)    Therapeutic Treatment and Modalities Summary:         Time-based treatments/modalities:    Physical Therapy Timed Treatment Charges  Therapeutic exercise minutes (CPT 55331): 42 minutes      Pain rating  (1-10) before treatment:  0; R elbow 3/10  Pain rating (1-10) after treatment:  0      ASSESSMENT:   Response to treatment:   Pt admitting limited hep compliance due to receiving COVID shot and hasn't been feeling well. Reviewed all hep today with min carryover, requiring instructions for correction. Does demo increases in shoulder AROM with t/s mobility ex not previously seen with manual and stretches to shoulder alone-therefore continuance of these ex with pt edu for continued importance of t/s mobility. Pt reporting he may visit mother and may go on vacation on Monday. Pt will contact PT office tomorrow after making decision if needing to cancel upcoming appts and would require a progress note for extended POC.    Overall, pt doing well and has maintained gains since initial eval, will continue to progress as able.    PLAN/RECOMMENDATIONS:   Plan for treatment: therapy treatment to continue next visit.  Planned interventions for next visit: continue with current treatment.

## 2021-11-18 NOTE — ASSESSMENT & PLAN NOTE
Patient is now having pain in his right elbow.  He got increasing pain was swelling and it was hot to the touch.  He denies any injury to the area.  He does have a history of gout but has never had problems there in the past.  He is able to bend it but it is painful.

## 2021-11-18 NOTE — PROGRESS NOTES
Subjective:     Chief Complaint   Patient presents with   • Elbow Pain     right side, worsening, swelling x 5 days    • Other     concerns with pre-diabetes       Philippe Hillman is a 45 y.o. male here today for evaluation and management of:    Chronic idiopathic gout of multiple sites  Patient is now having pain in his right elbow.  He got increasing pain was swelling and it was hot to the touch.  He denies any injury to the area.  He does have a history of gout but has never had problems there in the past.  He is able to bend it but it is painful.     Patient is also concerned about prediabetes.  He denies any polyuria or polyphagia.    No Known Allergies    Current medicines (including changes today)  Current Outpatient Medications   Medication Sig Dispense Refill   • febuxostat (ULORIC) 40 MG Tab Take 1 Tablet by mouth every day. 30 Tablet 1   • fluocinonide (LIDEX) 0.05 % Cream Apply small amount to rash BID prn 30 g 1   • colchicine (COLCRYS) 0.6 MG Tab 2 tabs at onset and one po 8 hours later 20 tablet 1   • indomethacin (INDOCIN) 50 MG Cap Take 1 Cap by mouth 3 times a day. 90 Cap 0   • COVID-19 mRNA vaccine, Moderna, (MODERNA COVID-19 VACCINE) 100 MCG/0.5ML Suspension injection Inject 0.25 mL into the shoulder, thigh, or buttocks. 0.25 mL 0     No current facility-administered medications for this visit.       He  has a past medical history of Measles (1983).    Patient Active Problem List    Diagnosis Date Noted   • Impingement syndrome of left shoulder 10/21/2021   • Chronic left shoulder pain 02/25/2021   • Family history of dermatomyositis 02/25/2021   • History of DVT of lower extremity 02/09/2021   • Dermatitis 07/09/2020   • Dry eye 08/09/2018   • Chronic idiopathic gout of multiple sites 04/20/2018   • Mallet deformity of right middle finger 08/03/2017   • Hyperglycemia 09/19/2016   • Allergic rhinitis due to pollen 09/12/2016   • High serum low-density lipoprotein (LDL) 09/12/2016       ROS   No  "fever or chills.  No nausea or vomiting.  No chest pain or palpitations.  No cough or SOB.  No pain with urination or hematuria.  No black or bloody stools.       Objective:     /72 (BP Location: Right arm, Patient Position: Sitting, BP Cuff Size: Adult)   Pulse 88   Temp 36.6 °C (97.8 °F)   Resp 12   Ht 1.702 m (5' 7\")   Wt 66.2 kg (146 lb)   SpO2 98%  Body mass index is 22.87 kg/m².   Physical Exam:  Well developed, well nourished.  Alert, oriented in no acute distress.  Eye contact is good, speech goal directed, affect calm  Eyes: conjunctiva non-injected, sclera non-icteric.  Neck Supple.  No adenopathy or masses in the neck or supraclavicular regions. No thyromegaly  Lungs: clear to auscultation bilaterally with good excursion. No wheezes or rhonchi  CV: regular rate and rhythm. No murmur  Elbow: No deformity or bruising noted.  Swelling with erythema on the lateral elbow without fluctuance.  Area is warm to touch.  Tenderness to palpation on inflamed area. Full range of motion bilaterally but painful in flexion. 5/5 strength bilaterally.      Assessment and Plan:   The following treatment plan was discussed    1. Need for vaccination  Updated  - INFLUENZA VACCINE QUAD INJ (PF)    2. Right elbow pain  Patient is scheduled to see physical therapy for lateral epicondylitis but will also have them work on his right elbow.  Concerned that this is gout flare  - Referral to Physical Therapy    3. Idiopathic chronic gout of multiple sites without tophus  Check uric acid.  Switch to Uloric 40 mg daily from the allopurinol.  Continue indomethacin as needed for pain.  - Referral to Physical Therapy  - febuxostat (ULORIC) 40 MG Tab; Take 1 Tablet by mouth every day.  Dispense: 30 Tablet; Refill: 1  - URIC ACID; Future    4. Prediabetes  Check hemoglobin A1c.  Dietary counseling done.  Await results.  - HEMOGLOBIN A1C; Future    Any change or worsening of signs or symptoms, patient encouraged to follow-up or " report to the emergency room for further evaluation. Patient understands and agrees.    Followup: Return in about 3 months (around 2/8/2022).

## 2021-11-19 ENCOUNTER — HOSPITAL ENCOUNTER (OUTPATIENT)
Dept: RADIOLOGY | Facility: MEDICAL CENTER | Age: 46
End: 2021-11-19
Attending: FAMILY MEDICINE
Payer: COMMERCIAL

## 2021-11-19 DIAGNOSIS — M25.521 RIGHT ELBOW PAIN: ICD-10-CM

## 2021-11-19 PROCEDURE — 73080 X-RAY EXAM OF ELBOW: CPT | Mod: RT

## 2021-11-24 ENCOUNTER — APPOINTMENT (OUTPATIENT)
Dept: PHYSICAL THERAPY | Facility: MEDICAL CENTER | Age: 46
End: 2021-11-24
Attending: ORTHOPAEDIC SURGERY
Payer: COMMERCIAL

## 2021-11-30 ENCOUNTER — PATIENT MESSAGE (OUTPATIENT)
Dept: MEDICAL GROUP | Facility: LAB | Age: 46
End: 2021-11-30

## 2021-11-30 DIAGNOSIS — M25.521 RIGHT ELBOW PAIN: ICD-10-CM

## 2021-12-01 ENCOUNTER — APPOINTMENT (OUTPATIENT)
Dept: PHYSICAL THERAPY | Facility: MEDICAL CENTER | Age: 46
End: 2021-12-01
Attending: ORTHOPAEDIC SURGERY
Payer: COMMERCIAL

## 2021-12-03 ENCOUNTER — APPOINTMENT (OUTPATIENT)
Dept: PHYSICAL THERAPY | Facility: MEDICAL CENTER | Age: 46
End: 2021-12-03
Attending: ORTHOPAEDIC SURGERY
Payer: COMMERCIAL

## 2021-12-08 ENCOUNTER — APPOINTMENT (OUTPATIENT)
Dept: PHYSICAL THERAPY | Facility: MEDICAL CENTER | Age: 46
End: 2021-12-08
Attending: ORTHOPAEDIC SURGERY
Payer: COMMERCIAL

## 2021-12-10 ENCOUNTER — PHYSICAL THERAPY (OUTPATIENT)
Dept: PHYSICAL THERAPY | Facility: MEDICAL CENTER | Age: 46
End: 2021-12-10
Attending: ORTHOPAEDIC SURGERY
Payer: COMMERCIAL

## 2021-12-10 DIAGNOSIS — M75.02 ADHESIVE CAPSULITIS OF LEFT SHOULDER: ICD-10-CM

## 2021-12-10 PROCEDURE — 97110 THERAPEUTIC EXERCISES: CPT

## 2021-12-10 PROCEDURE — 97140 MANUAL THERAPY 1/> REGIONS: CPT

## 2021-12-10 NOTE — OP THERAPY DAILY TREATMENT
Outpatient Physical Therapy  DAILY TREATMENT     [facility] Outpatient Physical Therapy  [address]  [place] [address]  Phone:  [phone]  Fax:  [phone]    Date: 12/10/2021    Patient: Philippe Hillman  YOB: 1975  MRN: [id number]     Time Calculation    Start time: 1307  Stop time: 1400 Time Calculation (min): 53 minutes         Chief Complaint: Shoulder Problem    Visit #: 11     *Pt 6 min late to appt    SUBJECTIVE:  Pt reporting went on vacation and hasn't spent a lot of time on exercises. Reporting continued elbow pain and he will be following up with orthopedics.      OBJECTIVE:  Current objective measures:   Shoulder AROM pre treatment:    L flexion: 115 deg  L abduction: 89 deg  L GH ER: 52       Shoulder AROM post-treatment today:   L flexion: 105  L abduction: 86 (42 at eval)  L GH ER: 49 (at eval: 42)         Therapeutic Exercises (CPT 52231):     1. Review of hep-see below    3. Pulleys GH flexion and IR, GH flexion seated x3 min, cardiovascular warm up    4. Pt eduction, re: importance of t/s mobility for shoulder AROM    5. Sleeper stretch , 30 sec x 2, hep    6. T/s rotation, x10 ea, reviewed with VC's to follow hand with eyes and head; resulting increase in range    7. Thread the needle, 10x (5x ea), limited motion L; hep    9. REIL, x15, visible LUE juddering; pain-free; hep    10. T/s extension over FR , 15x    11. FR activities-alt GH flexion, open book pec stretch, michelle wings, 15x ea, improved range of motion following manual therapy      Therapeutic Exercise Summary: Pt performed these exercises with instruction and SPV.  Provided handout with these exercises for daily HEP.  See media     Therapeutic Treatments and Modalities:     2. E Stim Unattended (CPT 90612), IFC and mhp to L shoulder x 15 min (cold pack added to L elbow)    3. Manual Therapy (CPT 08128), see below    Therapeutic Treatment and Modalities Summary: L scapular  mobs all directions Grade III-IV with pt R SL (pillow b/w knees for comfort) and pillow barrier between pt and therapist followed by L subscap release 1x10.       Time-based treatments/modalities:    Physical Therapy Timed Treatment Charges  Manual therapy minutes (CPT 08519): 10 minutes  Therapeutic exercise minutes (CPT 35375): 28 minutes      Pain rating (1-10) before treatment:  0; R elbow 3/10  Pain rating (1-10) after treatment:  0      ASSESSMENT:   Response to treatment:   Pt has maintained range of motion from previous sessions; despite not being completely compliant to hep; does respond well to thoracic mobility and manual to scapular. Pt did recently have x rays of elbow and remarkable for bone spurs; he is planning to follow up with orthopedics.         PLAN/RECOMMENDATIONS:   Plan for treatment: therapy treatment to continue next visit.  Planned interventions for next visit: continue with current treatment.  Review follow ups with orthopedics; screen elbow if continued complaints

## 2021-12-13 ENCOUNTER — PHYSICAL THERAPY (OUTPATIENT)
Dept: PHYSICAL THERAPY | Facility: MEDICAL CENTER | Age: 46
End: 2021-12-13
Attending: ORTHOPAEDIC SURGERY
Payer: COMMERCIAL

## 2021-12-13 DIAGNOSIS — M75.02 ADHESIVE CAPSULITIS OF LEFT SHOULDER: ICD-10-CM

## 2021-12-13 PROCEDURE — 97110 THERAPEUTIC EXERCISES: CPT

## 2021-12-13 NOTE — OP THERAPY DAILY TREATMENT
Outpatient Physical Therapy  DAILY TREATMENT     [facility] Outpatient Physical Therapy  [address]  [place] [address]  Phone:  [phone]  Fax:  [phone]    Date: 12/13/2021    Patient: Philippe Hillman  YOB: 1975  MRN: [id number]     Time Calculation    Start time: 1031  Stop time: 1113 Time Calculation (min): 42 minutes         Chief Complaint: Shoulder Problem    Visit #: 7     *Pt several min late to appt today    SUBJECTIVE:  Pt reporting has orthopedics appt on Thursday to assess elbow. Additional follow up meeting with Dr. Salmeron on Wednesday.     OBJECTIVE:  Current objective measures:   Shoulder AROM pre treatment:    L flexion: 110 deg  L abduction: 88 deg  L GH ER: 56       Shoulder AROM post-treatment today:   L flexion: 118  L abduction: 86 (42 at eval)  L GH ER: 60 (at eval: 42)     Elbow:   R: 124 deg flexion (hard end feel with pain)  R: -7 deg elbow extension        Therapeutic Exercises (CPT 96921):     1. Review of hep-see below    3. Pulleys GH flexion and IR, GH flexion seated x3 min, cardiovascular warm up    4. Pt eduction, re: importance of t/s mobility for shoulder AROM    5. Sleeper stretch , 30 sec x 2, hep    6. T/s rotation, x10 ea, reviewed with VC's to follow hand with eyes and head; resulting increase in range    7. Thread the needle, 10x (5x ea), limited motion L; hep    9. REIL, x15, visible LUE juddering; pain-free; hep    10. T/s extension over FR , 15x    11. FR activities-alt GH flexion, open book pec stretch, michelle wings, 15x ea, improved range of motion following manual therapy    12. Rows, pink TB, 10x with 10 sec hold, hep    13. Pull downs, pink TB, 10x with 10 sec hold, hep    14. [place], orange TB, 10x with 10 sec hold, hep      Therapeutic Exercise Summary: Pt performed these exercises with instruction and SPV.  Has HO of current exercise routine    Hand loops utilized when performing banded  exercises    Therapeutic Treatments and Modalities:     2. E Stim Unattended (CPT 96773), IFC and mhp to L shoulder x 15 min (cold pack added to L elbow)    Time-based treatments/modalities:           Pain rating (1-10) before treatment:  0; R elbow 3/10  Pain rating (1-10) after treatment:  0      ASSESSMENT:   Response to treatment:   Increase in ROM following increase in strengthening exercises; updated these to hep-pt did these in the past and still has HO's. Upon elbow screen, demonstrating TTP at lateral epicondyle and pain with wrist extension; concern due to hard end feel with limitations into R elbow flexion/extension-does have follow up with orthopedics next week and Dr. Gan for Frozen shoulder referral; will follow up with pt re: these appts.  Will continue strengthening in addition to mobility ex; abd continues to be the most limited ROM at this time; may benefit from additional manual.        PLAN/RECOMMENDATIONS:   Plan for treatment: therapy treatment to continue next visit.  Planned interventions for next visit: continue with current treatment.  Follow up re: appts as above; continue strengthening progression  Manual for GH abd

## 2021-12-15 ENCOUNTER — PHYSICAL THERAPY (OUTPATIENT)
Dept: PHYSICAL THERAPY | Facility: MEDICAL CENTER | Age: 46
End: 2021-12-15
Attending: ORTHOPAEDIC SURGERY
Payer: COMMERCIAL

## 2021-12-15 DIAGNOSIS — M75.02 ADHESIVE CAPSULITIS OF LEFT SHOULDER: ICD-10-CM

## 2021-12-15 PROCEDURE — 97140 MANUAL THERAPY 1/> REGIONS: CPT

## 2021-12-15 PROCEDURE — 97110 THERAPEUTIC EXERCISES: CPT

## 2021-12-15 PROCEDURE — 97014 ELECTRIC STIMULATION THERAPY: CPT

## 2021-12-15 NOTE — OP THERAPY DAILY TREATMENT
Outpatient Physical Therapy  DAILY TREATMENT     [facility] Outpatient Physical Therapy  [address]  [place] [address]  Phone:  [phone]  Fax:  [phone]    Date: 12/15/2021    Patient: Philippe Hillman  YOB: 1975  MRN: [id number]     Time Calculation    Start time: 1020  Stop time: 1115 Time Calculation (min): 55 minutes         Chief Complaint: Shoulder Problem    Visit #: 8     *Pt few min late to appt today    SUBJECTIVE:  Pt reporting has orthopedics appt on Thursday to assess elbow. Additional follow up meeting with Dr. Salmeron on Wednesday.       OBJECTIVE:  Current objective measures:   Shoulder AROM pre treatment:    L flexion: 115 deg  L abduction: 81 deg  L GH ER: 53      Shoulder AROM post-treatment today:   L flexion: 118  L abduction: 86 (42 at eval)  L GH ER: 60 (at eval: 42)     Elbow:   R: 124 deg flexion (hard end feel with pain)  R: -7 deg elbow extension        Therapeutic Exercises (CPT 53789):     1. Review of hep-see below    2. UBE, x5, cardiovascular warm up    6. T/s rotation, x10 ea, reviewed    9. REIL, NT    10. T/s extension over FR , NT    11. FR activities-alt GH flexion, open book pec stretch, michelle wings, NT    12. Rows, NT    13. Pull downs, NT    14. [place], NT    15. Modified box supine, orange TB, x10, fatigue with difficulty maintaining GH ER      Therapeutic Exercise Summary: Pt performed these exercises with instruction and SPV.  Has HO of current exercise routine    Hand loops utilized when performing banded exercises    Therapeutic Treatments and Modalities:     1. Manual Therapy (CPT 74152), see below    2. E Stim Unattended (CPT 39899), IFC and mhp to L shoulder x 15 min (cold pack added to L elbow)    Therapeutic Treatment and Modalities Summary:  inf glides gr III and IV with pt supine followed by STM to L levator scap and rhomboids and L pec. Then L scapular mobs all directions Grade III-IV with pt R  SL (pillow b/w knees for comfort) and pillow barrier between pt and therapist followed by L subscap release x10. Additional scapular mobs into rotation performed in same positioning.    Time-based treatments/modalities:    Physical Therapy Timed Treatment Charges  Manual therapy minutes (CPT 60657): 19 minutes  Therapeutic exercise minutes (CPT 08039): 21 minutes      Pain rating (1-10) before treatment:  0; R elbow 3/10  Pain rating (1-10) after treatment:  0      ASSESSMENT:   Response to treatment:   Pt has maintained increase in range of motion from previous session but did not attain any additional range today despite increased manual for abduction emphasis. Pt will be meeting with Dr. Gan today re: frozen shoulder and tomorrow with orthopedics re: R elbow complaints. Will continue with strengthening emphasis based on weakness noted into higher elevations (as observed with modified box exercises)        PLAN/RECOMMENDATIONS:   Plan for treatment: therapy treatment to continue next visit.  Planned interventions for next visit: continue with current treatment.  Follow up re: appts as above; continue strengthening progression

## 2021-12-20 ENCOUNTER — PHYSICAL THERAPY (OUTPATIENT)
Dept: PHYSICAL THERAPY | Facility: MEDICAL CENTER | Age: 46
End: 2021-12-20
Attending: ORTHOPAEDIC SURGERY
Payer: COMMERCIAL

## 2021-12-20 DIAGNOSIS — M75.02 ADHESIVE CAPSULITIS OF LEFT SHOULDER: ICD-10-CM

## 2021-12-20 PROCEDURE — 97014 ELECTRIC STIMULATION THERAPY: CPT

## 2021-12-20 PROCEDURE — 97110 THERAPEUTIC EXERCISES: CPT

## 2021-12-20 PROCEDURE — 97140 MANUAL THERAPY 1/> REGIONS: CPT

## 2021-12-20 NOTE — OP THERAPY DAILY TREATMENT
Outpatient Physical Therapy  DAILY TREATMENT     [facility] Outpatient Physical Therapy  [address]  [place] [address]  Phone:  [phone]  Fax:  [phone]    Date: 12/20/2021    Patient: Philippe Hillman  YOB: 1975  MRN: [id number]     Time Calculation    Start time: 1030  Stop time: 1130 Time Calculation (min): 60 minutes         Chief Complaint: shoulder pain  Visit #: 9    SUBJECTIVE:  Pt saw PA last week and she gave him a subacromial injection. Pt thinks it has helped some.    OBJECTIVE:    Pre treatment flexion 125*, abduction 89*, ER 57*.      Therapeutic Exercises (CPT 58805):     2. UBE, x5, cardiovascular warm up    3. Dowel flexion and behind the neck. , 3 minutes    4. Dowel scaption with OP from right UE. , 3 mintues    5. Wall flexion slides     6. T/s rotation, x10 ea, reviewed    11. FR activities-alt GH flexion, open book pec stretch, michelle wings, NT    12. Rows, 10# 3 x 10     13. Pull downs, 10# 3 x 10    14. [place]    15. Modified box supine, orange TB, x10, fatigue with difficulty maintaining GH ER      Therapeutic Exercise Summary:         Therapeutic Treatments and Modalities:     1. Manual Therapy (CPT 66079), see below    2. E Stim Unattended (CPT 00090), IFC and mhp to L shoulder x 15 min (cold pack added to L elbow)    Therapeutic Treatment and Modalities Summary:  inf glides gr III and IV with pt supine followed by STM to L levator scap and rhomboids and L pec. Then L scapular mobs all directions Grade III-IV with pt R SL      Time-based treatments/modalities:    Physical Therapy Timed Treatment Charges  Manual therapy minutes (CPT 66870): 20 minutes  Therapeutic exercise minutes (CPT 34377): 25 minutes    ASSESSMENT:   Response to treatment: post treatment flexion 128*. pt's ROM improved since cortisone injection last week.     PLAN/RECOMMENDATIONS:   Plan for treatment: therapy treatment to continue next visit.  Planned  interventions for next visit: manual therapy (CPT 24483), neuromuscular re-education (CPT 18219) and therapeutic exercise (CPT 43331).

## 2021-12-22 ENCOUNTER — PHYSICAL THERAPY (OUTPATIENT)
Dept: PHYSICAL THERAPY | Facility: MEDICAL CENTER | Age: 46
End: 2021-12-22
Attending: ORTHOPAEDIC SURGERY
Payer: COMMERCIAL

## 2021-12-22 DIAGNOSIS — M75.02 ADHESIVE CAPSULITIS OF LEFT SHOULDER: ICD-10-CM

## 2021-12-22 PROCEDURE — 97110 THERAPEUTIC EXERCISES: CPT

## 2021-12-22 PROCEDURE — 97014 ELECTRIC STIMULATION THERAPY: CPT

## 2021-12-22 NOTE — OP THERAPY DAILY TREATMENT
"  Outpatient Physical Therapy  DAILY TREATMENT     [facility] Outpatient Physical Therapy  [address]  [place] [address]  Phone:  [phone]  Fax:  [phone]    Date: 12/22/2021    Patient: Philippe Hillman  YOB: 1975  MRN: [id number]     Time Calculation    Start time: 1038  Stop time: 1130 Time Calculation (min): 52 minutes         Chief Complaint: shoulder pain  Visit #: 10    *Pt 8 min late to appt    SUBJECTIVE:  Pt reporting noticing improved mobility with shoulder since steroid injection.     OBJECTIVE:    Pre treatment   flexion 125, abduction 89, ER 55  *visible muscle juddering with stance limbs B in Qped positioning    Post treatment:  Flexion 129, abduction 90    pt demonstrating difficulty with dissociation of shoulder and trunk during abd with visible trunk rotation compensation    Therapeutic Exercises (CPT 91295):     2. UBE, x5 min, cardiovascular warm up    3. Reverse wall push offs     4. Prone c/s retraction, emphasis on upper t/s and c/s AROM    5. Wall flexion slides     6. T/s rotation, x10 ea, reviewed    11. FR activities-alt GH flexion, open book pec stretch, michelle wings, x10 ea, limited ROM in end ranges elevation     15. Modified box supine, NT    16. Qped reaching with TB , orange TB, x10 flexion and abd, limited ROM at end range     17. Qped with dumbbell, dumbbell 3>2# (flexion, abd and scaption, unable to complete with 3# due to \"too heavy;\" pt demonstrating difficulty with dissociation of shoulder and trunk during abd with visible trunk rotation compensation      Therapeutic Exercise Summary:       Therapeutic Treatments and Modalities:     1. Manual Therapy (CPT 72736), see below    2. E Stim Unattended (CPT 09668), IFC and mhp to L shoulder x 15 min    Therapeutic Treatment and Modalities Summary: STM to Levator scap L with pt in sidelying followed by subscap release with pt in supine     Time-based " treatments/modalities:    Physical Therapy Timed Treatment Charges  Manual therapy minutes (CPT 52152): 11 minutes  Therapeutic exercise minutes (CPT 59917): 26 minutes    ASSESSMENT:   Response to treatment:   Pt reporting elbow has improved significantly and will not need further interventions at this time. Does have MRI ordered but option is up to pt per report as will not impact treatment. Pt approved for additional visits of PT due to positive response from therapy over past few weeks. Pt does continue to demonstrate limited ROM into end ranges (most restricted in abduction) with visible muscle juddering in Qped (both involved and uninvolved arms) suggesting additional strengthening may be warranted B at shoulder and periscapular regions. Pt may additionally benefit from further manual to improve abduction, will trial more manual to L pec.       PLAN/RECOMMENDATIONS:   Plan for treatment: therapy treatment to continue next visit.  Planned interventions for next visit: manual therapy (CPT 43784), neuromuscular re-education (CPT 46359) and therapeutic exercise (CPT 01003).  Manual to L pec and improve shoulder abd  Dissociation activities for trunk and L shoulder jessica into abd

## 2021-12-27 ENCOUNTER — PHYSICAL THERAPY (OUTPATIENT)
Dept: PHYSICAL THERAPY | Facility: MEDICAL CENTER | Age: 46
End: 2021-12-27
Attending: ORTHOPAEDIC SURGERY
Payer: COMMERCIAL

## 2021-12-27 DIAGNOSIS — M75.02 ADHESIVE CAPSULITIS OF LEFT SHOULDER: ICD-10-CM

## 2021-12-27 PROCEDURE — 97110 THERAPEUTIC EXERCISES: CPT

## 2021-12-27 NOTE — OP THERAPY DAILY TREATMENT
Outpatient Physical Therapy  DAILY TREATMENT     [facility] Outpatient Physical Therapy  [address]  [place] [address]  Phone:  [phone]  Fax:  [phone]    Date: 12/27/2021    Patient: Philippe Hillman  YOB: 1975  MRN: [id number]     Time Calculation    Start time: 1032  Stop time: 1127 Time Calculation (min): 55 minutes         Chief Complaint: shoulder pain  Visit #: 11      SUBJECTIVE:  Pt reporting no longer noticing pain in L shoulder and elbow only hurts intermittently. Pt has recently begun taking Tumeric for anti-inflammatory properties.    OBJECTIVE:  Post treatment:  Flexion 120, abduction 90; ER: 55 deg    pt demonstrating difficulty with dissociation of shoulder and trunk during abd with visible trunk rotation compensation; unable to achieve neutral shoulder horizontal abd    Therapeutic Exercises (CPT 18663):     2. UBE, x6 min alt CW and CCW, cardiovascular warm up    3. Reverse wall push offs     4. Prone c/s retraction, emphasis on upper t/s and c/s AROM    6. Shoulder ladder flexion and abd, climb+sustained stretch 30 sec x4 ea, able to reach level 21>25 on shoulder ladder for forward flexion; 24 for abduction, does have some compensation of trunk turn due to tightness at L pec     8. T/s extension over foam roller, 10x, weakness in abdominal region with fatigue    9. Counter push ups, 20x    11. Wall pec stretch, 4x30 sec , pt reporting slight soreness at posterior L shoulder; improved with lowering arm into less abd; hep    12. Thread the needle , 10, min rotation     15. Modified box supine, NT    16. Qped reaching , no dumbbells this session; x10 ea with emphasis on scap protraction, limited ROM at end range LGH with attempted trunk rotation;       Therapeutic Exercise Summary:       Therapeutic Treatments and Modalities:     2. E Stim Unattended (CPT 45692), IFC and mhp to L shoulder x 15 min    Time-based  treatments/modalities:    Physical Therapy Timed Treatment Charges  Therapeutic exercise minutes (CPT 68722): 40 minutes    ASSESSMENT:   Response to treatment:   Continued difficulty with dissociation of shoulder and trunk during abd with visible trunk rotation compensation; unable to achieve neutral shoulder horizontal abd. Pt does continue to demonstrate limited ROM into end ranges (most restricted in abduction) with visible muscle juddering in Qped-improved compared to prior session. Some increased ROM while on ladder performing flexion and abd but did not carryover to GH AROM post session. Pt will likely continue to benefit from pec stretching and mobility exercises to achieve neutral horizontal abd GH ROM.      PLAN/RECOMMENDATIONS:   Plan for treatment: therapy treatment to continue next visit.  Planned interventions for next visit: manual therapy (CPT 73469), neuromuscular re-education (CPT 20346) and therapeutic exercise (CPT 12515).  Assess response to pec stretch  Dissociation activities for trunk and L shoulder jessica into abd

## 2021-12-30 ENCOUNTER — PHYSICAL THERAPY (OUTPATIENT)
Dept: PHYSICAL THERAPY | Facility: MEDICAL CENTER | Age: 46
End: 2021-12-30
Attending: ORTHOPAEDIC SURGERY
Payer: COMMERCIAL

## 2021-12-30 DIAGNOSIS — M75.02 ADHESIVE CAPSULITIS OF LEFT SHOULDER: ICD-10-CM

## 2021-12-30 PROCEDURE — 97140 MANUAL THERAPY 1/> REGIONS: CPT

## 2021-12-30 PROCEDURE — 97110 THERAPEUTIC EXERCISES: CPT

## 2021-12-30 PROCEDURE — 97014 ELECTRIC STIMULATION THERAPY: CPT

## 2021-12-30 NOTE — OP THERAPY DAILY TREATMENT
Outpatient Physical Therapy  DAILY TREATMENT     [facility] Outpatient Physical Therapy  [address]  [place] [address]  Phone:  [phone]  Fax:  [phone]    Date: 12/30/2021    Patient: Philippe Hillman  YOB: 1975  MRN: [id number]     Time Calculation    Start time: 1034  Stop time: 1129 Time Calculation (min): 55 minutes     *pt a few min late to appt today    Chief Complaint: shoulder pain  Visit #: 12      SUBJECTIVE:  Pt reporting no longer noticing pain in L shoulder and elbow only hurts intermittently. Pt has recently begun taking Tumeric for anti-inflammatory properties.    OBJECTIVE:  Pre treatment:  Flexion 121, abduction 80; ER: 58 deg    Post treatment:  Flexion 130, abduction 90 deg    pt demonstrating difficulty with dissociation of shoulder and trunk during abd with visible trunk rotation compensation; unable to achieve neutral shoulder horizontal abd    Sig hypomobile t/s throughout    Therapeutic Exercises (CPT 40245):     2. UBE, x6 min alt CW and CCW, cardiovascular warm up    3. Reverse wall push offs     4. Prone c/s retraction, emphasis on upper t/s and c/s AROM    6. Shoulder ladder flexion and abd, climb+sustained stretch 30 sec x4 ea, flexion and abd to 21 on ladder    8. T/s extension over foam roller, NT    9. Counter push ups, NT    11. Wall pec stretch, 2x30 sec , reviewed    12. Thread the needle , NT    15. Modified box supine, NT    16. Qped reaching , no dumbbells this session; x10 ea with emphasis on scap protraction, limited ROM at end range LGH with attempted trunk rotation;       Therapeutic Exercise Summary:       Therapeutic Treatments and Modalities:     2. E Stim Unattended (CPT 62407), IFC and mhp to L shoulder x 15 min    3. Manual Therapy (CPT 14503), see below    Therapeutic Treatment and Modalities Summary: Manual: inf and AP mobs gr III and IV to L SCJ, inf and PA mobs to AC for gen mobility with shoulder  at side and at end range, inf mobs to first rib. Then CPA to CTJ and T1-T10 gr III and IV//sig increase in flexion>abd ROM following    Time-based treatments/modalities:    Physical Therapy Timed Treatment Charges  Manual therapy minutes (CPT 68224): 21 minutes  Therapeutic exercise minutes (CPT 18148): 19 minutes    ASSESSMENT:   Response to treatment:   Significant improvements in shoulder ROM flexion>abd following manual directed more to t/s, SCJ, first rib and ACJ. Pt sore at end of session today but minimally. May benefit from further manual therapy to these regions. Will introduce self first rib/ACJ mobs at next session.      PLAN/RECOMMENDATIONS:   Plan for treatment: therapy treatment to continue next visit.  Planned interventions for next visit: manual therapy (CPT 97819), neuromuscular re-education (CPT 67608) and therapeutic exercise (CPT 57217).  Repeat manual; introduce self mobs for hep

## 2022-01-04 ENCOUNTER — PHYSICAL THERAPY (OUTPATIENT)
Dept: PHYSICAL THERAPY | Facility: MEDICAL CENTER | Age: 47
End: 2022-01-04
Attending: FAMILY MEDICINE
Payer: COMMERCIAL

## 2022-01-04 DIAGNOSIS — M75.02 ADHESIVE CAPSULITIS OF LEFT SHOULDER: ICD-10-CM

## 2022-01-04 PROCEDURE — 97140 MANUAL THERAPY 1/> REGIONS: CPT

## 2022-01-04 PROCEDURE — 97110 THERAPEUTIC EXERCISES: CPT

## 2022-01-04 NOTE — OP THERAPY DAILY TREATMENT
"  Outpatient Physical Therapy  DAILY TREATMENT     [facility] Outpatient Physical Therapy  [address]  [place] [address]  Phone:  [phone]  Fax:  [phone]    Date: 01/04/2022    Patient: Philippe Hillman  YOB: 1975  MRN: [id number]     Time Calculation    Start time: 0820  Stop time: 0858 Time Calculation (min): 38 minutes     *pt a few min late to appt today; delayed start to session    Chief Complaint: shoulder pain  Visit #: 13      SUBJECTIVE:  Pt reporting shoulder not sore from last session. Reports some soreness with stretching in doorway. Pt reporting feels a little better with \"less strain.\"     OBJECTIVE:  Pre treatment:  Flexion 120, abduction 87; ER: 54 deg    Post treatment:  Flexion 125, abduction 93 deg; ER    pt demonstrating difficulty with dissociation of shoulder and trunk during abd with visible trunk rotation compensation; unable to achieve neutral shoulder horizontal abd    Sig hypomobile t/s throughout    Therapeutic Exercises (CPT 94647):     2. UBE, x6 min alt CW and CCW, cardiovascular warm up    4. Prone c/s retraction, upper t/s     6. Shoulder ladder flexion and abd, climb+sustained stretch 30 sec x4 ea, flexion and abd to 21 on ladder    11. Wall pec stretch, verbal review only     12. Thread the needle , 15x L only, reviewed    15. Modified box supine, NT    16. Qped reaching , no dumbbells this session; x10 ea with emphasis on scap protraction, limited ROM at end range LGH with attempted trunk rotation;     17. 1st rib depression and mobilization using head rotations, belt on L shoulder; 2x15       Therapeutic Exercise Summary:       Therapeutic Treatments and Modalities:     3. Manual Therapy (CPT 49539), see below    Therapeutic Treatment and Modalities Summary: Manual: inf and AP mobs gr III and IV to L SCJ, inf and PA mobs to AC for gen mobility with shoulder at side and at end range, inf mobs to first rib. Then CPA " to CTJ and T1-T10 gr III and IV//sig increase in flexion>abd ROM following    Time-based treatments/modalities:    Physical Therapy Timed Treatment Charges  Manual therapy minutes (CPT 15806): 15 minutes  Therapeutic exercise minutes (CPT 93351): 23 minutes    ASSESSMENT:   Response to treatment:   Did not maintain gains from last session (130 deg); however has not lost overall gains since injection and progression of physical therapy in prior sessions. Repeat of ACJ and SCJ mobs today with exercises with some increases in ROM. Stiffness and min movement with prone c/s retractions suggesting limited lower c/s and upper t/s hypomobility. Pt overall demonstrating gradual increases in ROM and will continue to address this in subsequent sessions due to relationship of neck and shoulder.     Pt requesting to skip modalities today due to busy work day.    PLAN/RECOMMENDATIONS:   Plan for treatment: therapy treatment to continue next visit.  Planned interventions for next visit: manual therapy (CPT 19990), neuromuscular re-education (CPT 05737) and therapeutic exercise (CPT 77500).  Reduce freq sessions to 1x/wk

## 2022-01-06 ENCOUNTER — OFFICE VISIT (OUTPATIENT)
Dept: DERMATOLOGY | Facility: IMAGING CENTER | Age: 47
End: 2022-01-06
Payer: COMMERCIAL

## 2022-01-06 DIAGNOSIS — R21 RASH: ICD-10-CM

## 2022-01-06 PROCEDURE — 99213 OFFICE O/P EST LOW 20 MIN: CPT | Performed by: DERMATOLOGY

## 2022-01-06 NOTE — PROGRESS NOTES
"CC: Rash      Subjective: previously seen patient here for red spots on feet    Patient returns for \"red spots\" on b/l feet.    Onset: 7-10 days  No treatment, has been improving. ASX - denies pain, itching.  No known bites. No new exposures known.  No swelling.    Has noted longer hairs on cheeks recently. Shaves when get too long. Has always had fewer hairs on cheeks than beard dist around mouth    ROS: no fevers/chills. No itch.  No cough  DermPMH: no skin cancer/melanoma  No problem-specific Assessment & Plan notes found for this encounter.    Relevant PMH: Hx DVT  Social: NS    PE: Gen:WDWN male in NAD. Skin: normal, sparse hair dist on cheeks, face. Benign appearing tan macules and few waxy papules on back/torso, appearing benign. scattered brown/pink macules on feet, none palpable. Legs appearing spared      A/P: rash on feet, improving. Cannot exclude vasculitis but suspicious for flea/bites:   -reviewed dx/causes  -will do UA to eval for reds/protein  -to RTC if recurs, worsens for skin bx/DIF; consider additional w/u if vasculitis found and/or worsening, progressing    Lentigos/SK:  -b/r    Reviewed normal hair dist, face, men.  -b/r    F/u PRN    I have reviewed medications relevant to my specialty.            "

## 2022-01-10 DIAGNOSIS — M1A.09X0 IDIOPATHIC CHRONIC GOUT OF MULTIPLE SITES WITHOUT TOPHUS: ICD-10-CM

## 2022-01-10 RX ORDER — INDOMETHACIN 50 MG/1
CAPSULE ORAL
Qty: 90 CAPSULE | Refills: 0 | Status: SHIPPED | OUTPATIENT
Start: 2022-01-10 | End: 2022-09-28 | Stop reason: SDUPTHER

## 2022-01-11 ENCOUNTER — APPOINTMENT (OUTPATIENT)
Dept: PHYSICAL THERAPY | Facility: MEDICAL CENTER | Age: 47
End: 2022-01-11
Attending: FAMILY MEDICINE
Payer: COMMERCIAL

## 2022-02-03 ENCOUNTER — ANTICOAGULATION MONITORING (OUTPATIENT)
Dept: MEDICAL GROUP | Facility: PHYSICIAN GROUP | Age: 47
End: 2022-02-03

## 2022-02-03 DIAGNOSIS — Z86.718 HISTORY OF DVT OF LOWER EXTREMITY: ICD-10-CM

## 2022-02-03 NOTE — PROGRESS NOTES
Discharged from [facility].  Pt has been transitioned to [facility] by SIMONE Fuchs, Clinical Pharmacist, CDE, CACP

## 2022-03-14 ENCOUNTER — APPOINTMENT (OUTPATIENT)
Dept: PHYSICAL THERAPY | Facility: MEDICAL CENTER | Age: 47
End: 2022-03-14
Attending: PHYSICIAN ASSISTANT
Payer: COMMERCIAL

## 2022-03-17 ENCOUNTER — APPOINTMENT (OUTPATIENT)
Dept: PHYSICAL THERAPY | Facility: MEDICAL CENTER | Age: 47
End: 2022-03-17
Attending: PHYSICIAN ASSISTANT
Payer: COMMERCIAL

## 2022-03-21 ENCOUNTER — PHYSICAL THERAPY (OUTPATIENT)
Dept: PHYSICAL THERAPY | Facility: MEDICAL CENTER | Age: 47
End: 2022-03-21
Attending: PHYSICIAN ASSISTANT
Payer: COMMERCIAL

## 2022-03-21 ENCOUNTER — TELEPHONE (OUTPATIENT)
Dept: PHYSICAL THERAPY | Facility: MEDICAL CENTER | Age: 47
End: 2022-03-21
Payer: COMMERCIAL

## 2022-03-21 DIAGNOSIS — M75.02 ADHESIVE CAPSULITIS OF LEFT SHOULDER: ICD-10-CM

## 2022-03-21 PROCEDURE — 97161 PT EVAL LOW COMPLEX 20 MIN: CPT

## 2022-03-21 PROCEDURE — 97110 THERAPEUTIC EXERCISES: CPT

## 2022-03-21 ASSESSMENT — ENCOUNTER SYMPTOMS
PAIN SCALE AT HIGHEST: 4
PAIN SCALE: 0
QUALITY: ACHING
PAIN SCALE AT LOWEST: 0

## 2022-03-21 NOTE — OP THERAPY DISCHARGE SUMMARY
Outpatient Physical Therapy  DISCHARGE SUMMARY NOTE      [facility] Outpatient Physical Therapy  [address]  [place] [address]  Phone:  [phone]  Fax:  [phone]    Date of Visit: 03/21/2022    Patient: Philippe Hillman  YOB: 1975  MRN: [id number]     Referring Provider: No referring provider defined for this encounter.   Referring Diagnosis No admission diagnoses are documented for this encounter.         Functional Assessment Used        Your patient is being discharged from Physical Therapy with the following comments:   · Goals partially met   · Pt will be starting new eval 3/21/22 to address adhesive capsulitis, therefore will discharge current encounter    Comments:  Pt seen for 13 visits; last seen 1/4/22.     Limitations Remaining:  Pt demonstrating some gains with combination of PT and injection from MD with continued limitations into GH elevation ROM; Please see daily note 1/4/22 for additional details. Pt failed to schedule additional follow ups after 1/4/22 and has not been seen.    Recommendations:  Will DC current encounter as pt will be attending PT with new referral to address L GHJ.     Florentino Rivera, PT    Date: 3/21/2022

## 2022-03-21 NOTE — OP THERAPY EVALUATION
Outpatient Physical Therapy  INITIAL EVALUATION    [facility] Outpatient Physical Therapy  [address]  [place] [address]  Phone:  [phone]  Fax:  [phone]    Date of Evaluation: 03/21/2022    Patient: Philippe Hillman  YOB: 1975  MRN: [id number]     Referring Provider: Tere Gabriel P.A.-C.  [address] [place] [address]   Referring Diagnosis Adhesive capsulitis of left shoulder [M75.02]     Time Calculation  Start time: 0950  Stop time: 1023 Time Calculation (min): 33 minutes         Chief Complaint: Shoulder Problem    Visit Diagnoses     ICD-10-CM   1. Adhesive capsulitis of left shoulder  M75.02       Date of onset of impairment: No data found    Subjective:   History of Present Illness:     Mechanism of injury:  *Pt 15 min late to check in for appt; delayed start to session to allow for completion of required paperwork/check-in procedures    Patient is a 45 year old male with a PMH including: measles and LASIX eye surgery.    Pt is known to this author and was seen previously for L frozen shoulder which initiated in January 2021; seen in OP PT 3/26/21-6/11/21 and DC-ed due to no functional and objective changes despite compliance to hep. Pt returning to therapy 11/4/21 for second round of therapy with this author; he also received cortizone injection which benefited pt's success in therapy. *Pt last seen 1/4/22. Pt left to go to [place] to help give care for his mother.     Pt returning to therapy today to reconvene care for L frozen shoulder complaints.  Pt reporting feels L shoulder ROM has improved. Reports continued limitation into abduction. Pt candid that he has not been completing previous PT exercises as prescribed; he is completing them once a week. Pt has played tennis intermittently during absence from PT. Pt denies new n/t or strength changes since last seen. Pt reports his R elbow has improved since last seen.    Prior  level of function:  Works for IT at [employer]  Sleep disturbance:  Not disrupted  Pain:     Current pain ratin    At best pain ratin    At worst pain ratin    Location:  L global shoulder    Quality:  Aching    Progression:  Stable    Pain Comments::  Aggravating: elevation, all shoulder AROM to end range, weakness with push ups    Relieving: resting  Treatments:     Treatment Comments:  PT in past at this clinic  Activities of Daily Living:     Patient reported ADL status: Patient's current daily routine includes:  Work: [employer]  Hobbies: Tennis      Patient Goals:     Other patient goals:  Increased ROM; returning to normal; return to push ups       Past Medical History:   Diagnosis Date   • Measles     in [place]     Past Surgical History:   Procedure Laterality Date   • EYE SURGERY      LASIX     Social History     Tobacco Use   • Smoking status: Never Smoker   • Smokeless tobacco: Never Used   Substance Use Topics   • Alcohol use: Yes     Comment: once a month     Family and Occupational History     Socioeconomic History   • Marital status: Single     Spouse name: Not on file   • Number of children: Not on file   • Years of education: Not on file   • Highest education level: Not on file   Occupational History   • Not on file       Objective     General Comments     Shoulder Comments   Objective:     Comments  Per eval 3/26/21:  AROM left shoulder 110 degrees, abduction 92 degrees, HBB wrist to L5, ER 34 degrees.    Eval today: 21:  Shoulder AROM:   R 153 flexion (113 L)  R 145 abduction (91 L)  R 70 ER (49 L)  Hand BTH R: T7 (T12 L)   (40 deg extension R) 30 deg extension L    Shoulder PROM:  125 in hooklying L flexion (125 deg L flexion)    GH isometrics: ( set up: 0 deg GH abd and elbow 90 deg)      Eval 21:  Shoulder AROM:   R 145 flexion (104 L)  R WFL abd (76 L)  R 83 ER (42 L)  Hand BTH R: T7 (T12 L)   30 deg extension L    Shoulder PROM:  135 in hooklying L flexion AROM and  PROM to 135  110 L flexion AROM in hooklying; and PROM to 117    GH isometrics: ( set up: 0 deg GH abd and elbow 90 deg)    MMT isometrics WFL R and 4-/5 L   MMT isometrics WFL R RC and 4-/5 L grossly    Middle traps 4-/5 B      Additional comments:   T/s mobility limited   Difficulty dissociating spinal segments during cat camel                Therapeutic Exercises (CPT 87703):     1. Thoracic rotation in SL, x10 ea, reviewed; hep    2. Qped cat camel, x10, hep    3. Star gazer-GH ER in hooklying, x10, hep    4. Pt education, re: bring hep from last two bouts of PT to pick top 5 for hep for increased compliance      Therapeutic Exercise Summary: Pt performed these exercises with instruction and SPV.  Provided handout with these exercises for daily HEP.        Time-based treatments/modalities:    Physical Therapy Timed Treatment Charges  Therapeutic exercise minutes (CPT 28349): 13 minutes      Assessment, Response and Plan:   Impairments: abnormal or restricted ROM, impaired physical strength, lacks appropriate home exercise program, limited mobility and pain with function    Assessment details:  Patient is a pleasant and cooperative 45 year old male who is known to this provider for diagnosis of L frozen shoulder; he was previously seen x 2 bouts of physical therapy. He had injection which benefited therapy, allowing pt to acquire additional ROM during sessions. He was last seen 1/4/22 with DC due to failure to schedule additional sessions (pt was on trip to assist with caring for his mother in [place]). Pt returns today to discuss further care of L shoulder complaints. He reports partial compliance to his old hep at 1x/wk. Pt continuing to demo ROM restrictions into elevation which limit hobbies and ADL's; t/s hypomobility, strength deficits at periscapular B and RC L>R. Pt may benefit from skilled physical therapy in order to address above impairments in order to improve QOL and return to reported ADL's.      Prognosis: fair    Goals:   Short Term Goals:   Pt will be compliant with daily HEP.   Pt will have review current hep with PT to condense exercises for improved compliance.    Short term goal time span:  2-4 weeks      Long Term Goals:    Pt will be able to perform push ups in order to return to his exercise and fitness goals  Pt will be able to play tennis with 50% or greater reduction pain for improved QOL.   Pt will demonstrate 30% or greater ROM improvements into elevation to perform ADL's such as reaching into cabinets.  Quickdash score will improve 50%. (eval: 25)    Long term goal time span:  6-8 weeks    Plan:   Therapy options:  Physical therapy treatment to continue  Planned therapy interventions:  Neuromuscular Re-education (CPT 84341), E Stim Unattended (CPT 02307), Manual Therapy (CPT 01068) and Therapeutic Exercise (CPT 99336)  Frequency: 1-2x/month.  Duration in weeks:  12  Discussed with:  Patient  Plan details:  UPOC: 6/17/22    *Pt may benefit from more intermittent sessions with increased emphasis on independent hep due to recovery timeline associated with frozen shoulder      Functional Assessment Used  Quickdash General Total Score: 25     Referring provider co-signature:  I have reviewed this plan of care and my co-signature certifies the need for services.    Certification Period: 03/21/2022 to 6/17/22    Physician Signature: ________________________________ Date: ______________

## 2022-03-24 ENCOUNTER — APPOINTMENT (OUTPATIENT)
Dept: PHYSICAL THERAPY | Facility: MEDICAL CENTER | Age: 47
End: 2022-03-24
Attending: PHYSICIAN ASSISTANT
Payer: COMMERCIAL

## 2022-03-28 ENCOUNTER — APPOINTMENT (OUTPATIENT)
Dept: PHYSICAL THERAPY | Facility: MEDICAL CENTER | Age: 47
End: 2022-03-28
Attending: PHYSICIAN ASSISTANT
Payer: COMMERCIAL

## 2022-03-30 ENCOUNTER — HOSPITAL ENCOUNTER (OUTPATIENT)
Dept: RADIOLOGY | Facility: MEDICAL CENTER | Age: 47
End: 2022-03-30
Attending: NURSE PRACTITIONER
Payer: COMMERCIAL

## 2022-03-30 ENCOUNTER — OFFICE VISIT (OUTPATIENT)
Dept: MEDICAL GROUP | Facility: LAB | Age: 47
End: 2022-03-30
Payer: COMMERCIAL

## 2022-03-30 VITALS
TEMPERATURE: 97.3 F | BODY MASS INDEX: 22.44 KG/M2 | RESPIRATION RATE: 14 BRPM | SYSTOLIC BLOOD PRESSURE: 118 MMHG | OXYGEN SATURATION: 96 % | WEIGHT: 143 LBS | DIASTOLIC BLOOD PRESSURE: 74 MMHG | HEIGHT: 67 IN | HEART RATE: 106 BPM

## 2022-03-30 DIAGNOSIS — M79.604 PAIN IN BOTH LOWER EXTREMITIES: ICD-10-CM

## 2022-03-30 DIAGNOSIS — M79.605 PAIN IN BOTH LOWER EXTREMITIES: ICD-10-CM

## 2022-03-30 PROCEDURE — 93970 EXTREMITY STUDY: CPT

## 2022-03-30 PROCEDURE — 99212 OFFICE O/P EST SF 10 MIN: CPT | Performed by: NURSE PRACTITIONER

## 2022-03-30 ASSESSMENT — FIBROSIS 4 INDEX: FIB4 SCORE: 0.79

## 2022-03-30 NOTE — PROGRESS NOTES
"Subjective:     CC:   Chief Complaint   Patient presents with   • Leg Pain     Both legs / requesting ultrasound ordered      HPI:   Philippe presents today with the following:    Bilateral leg pain  Patient reports that he felt pain on LE bilaterally above the knee that started yesterday. He reports that it is painful to touch on both sides. He reports that it feels better than yesterday, but feels like it is still there.   He is on ASA 81 mg BID.   He did fly last week on Sunday.   He does have a history of blood clots.   Denies swelling, redness, warmth, injury, chest pain, shortness of breath.    ROS:   Gen: no fevers/chills, no changes in weight  Eyes: no changes in vision  ENT: no sore throat, no hearing loss, no bloody nose  Pulm: no sob, no cough  CV: no chest pain, no palpitations  GI: no nausea/vomiting, no diarrhea  : no dysuria  MSk: no myalgias  Skin: no rash  Neuro: no headaches, no numbness/tingling  Heme/Lymph: no easy bruising        - NOTE: All other systems reviewed and are negative, except as in HPI.    Objective:     Exam: /74 (BP Location: Right arm, Patient Position: Sitting, BP Cuff Size: Adult)   Pulse (!) 106   Temp 36.3 °C (97.3 °F)   Resp 14   Ht 1.702 m (5' 7\")   Wt 64.9 kg (143 lb)   SpO2 96%  Body mass index is 22.4 kg/m².    General: Normal appearing. No distress.  Neck: Supple without JVD or bruit. Thyroid is not enlarged.  Pulmonary: Clear to ausculation.  Normal effort. No rales, ronchi, or wheezing.  Cardiovascular: Regular rate and rhythm without murmur. Carotid and radial pulses are intact and equal bilaterally.  Neurologic: Grossly nonfocal  Lymph: No cervical or supraclavicular lymph nodes are palpable  Skin: Warm and dry.  No obvious lesions.  Musculoskeletal: Normal gait. No extremity cyanosis, clubbing, or edema.  Psych: Normal mood and affect. Alert and oriented x3. Judgment and insight is normal.    Assessment & Plan:     46 y.o. male with the following - "     1. Pain in both lower extremities  US ordered, will contact patient with results.   - US-EXTREMITY VENOUS LOWER BILAT; Future

## 2022-03-31 ENCOUNTER — PHYSICAL THERAPY (OUTPATIENT)
Dept: PHYSICAL THERAPY | Facility: MEDICAL CENTER | Age: 47
End: 2022-03-31
Attending: PHYSICIAN ASSISTANT
Payer: COMMERCIAL

## 2022-03-31 DIAGNOSIS — M75.02 ADHESIVE CAPSULITIS OF LEFT SHOULDER: ICD-10-CM

## 2022-03-31 PROCEDURE — 97110 THERAPEUTIC EXERCISES: CPT

## 2022-03-31 NOTE — OP THERAPY DAILY TREATMENT
"  Outpatient Physical Therapy  DAILY TREATMENT     [facility] Out[facility] Therapy  [address]  [place] [address]  Phone:  [phone]  Fax:  [phone]    Date: 03/31/2022    Patient: Philippe Hillman  YOB: 1975  MRN: [id number]     Time Calculation    Start time: 0955  Stop time: 1028 Time Calculation (min): 33 minutes         Chief Complaint: Shoulder Problem    Visit #: 15   *Pt nearly 10 min late to appt today    SUBJECTIVE:  Pt reporting difficulty complreting     OBJECTIVE:  Current objective measures:   Hypomobile t/s    Shoulder AROM-assessed in sitting:   R 153 flexion (125 L)  R 145 abduction (87 L)  R 70 ER (52 L)  Hand BTH R: T7 (T12 L)          (40 deg extension R) 22 deg extension L      Venous US extremity 3/31/22:  IMPRESSION:     Negative for right or left lower extremity deep venous thrombus    Therapeutic Exercises (CPT 89321):     1. Thoracic rotation in SL, x10 ea, reviewed; hep    2. Qped cat camel, x10, reviewed    5. FR activities     6. UBE , x8 min , cardiovascular warm up    7. Review of hep, pt brought all previous hep packets; therapist reviewing and picking top 5 exercises for incr compliance    8. Open book pec stretch on FR , x15 min       Therapeutic Exercise Summary: Pt performed these exercises with instruction and SPV.  Provided handout with these exercises for daily HEP.        Time-based treatments/modalities:    Physical Therapy Timed Treatment Charges  Therapeutic exercise minutes (CPT 87894): 33 minutes      Pain rating (1-10) before treatment:  0  Pain rating (1-10) after treatment:  0    ASSESSMENT:   Response to treatment:   Pt recently seen by KIMBERLEE 3/30/22, per MD report \"Patient reports that he felt pain bilaterally, but above the knee that started yesterday. He went to the hospital. He reports that it is painful to touch on both sides.\" Neg US for blood clots with Pt reporting leg pain has resolved at " "this time.   Returning to PT with min compliance to hep due to \"being busy with work;\" condensed hep for increased compliance with min ex. Hypomobile t/s with ex emphasizing mobility in this area to augment hep of shoulder ex. Discussed 1x/wk for continued care due to longevity of care required for his diagnosis.     Abbreviated session due to pt late to appt today.    PLAN/RECOMMENDATIONS:   Plan for treatment: therapy treatment to continue next visit.  Planned interventions for next visit: continue with current treatment.  Continue 1x/wk; assess response to hep  Manual and stretching with prolonged holds at L shoulder     "

## 2022-04-04 ENCOUNTER — APPOINTMENT (OUTPATIENT)
Dept: PHYSICAL THERAPY | Facility: MEDICAL CENTER | Age: 47
End: 2022-04-04
Attending: PHYSICIAN ASSISTANT
Payer: COMMERCIAL

## 2022-04-07 ENCOUNTER — APPOINTMENT (OUTPATIENT)
Dept: PHYSICAL THERAPY | Facility: MEDICAL CENTER | Age: 47
End: 2022-04-07
Attending: PHYSICIAN ASSISTANT
Payer: COMMERCIAL

## 2022-04-28 ENCOUNTER — PHYSICAL THERAPY (OUTPATIENT)
Dept: PHYSICAL THERAPY | Facility: MEDICAL CENTER | Age: 47
End: 2022-04-28
Attending: PHYSICIAN ASSISTANT
Payer: COMMERCIAL

## 2022-04-28 DIAGNOSIS — M75.02 ADHESIVE CAPSULITIS OF LEFT SHOULDER: ICD-10-CM

## 2022-04-28 PROCEDURE — 97110 THERAPEUTIC EXERCISES: CPT

## 2022-04-28 NOTE — OP THERAPY DAILY TREATMENT
Outpatient Physical Therapy  DAILY TREATMENT     [facility] Outpatient Physical Therapy  [address]  [place] [address]  Phone:  [phone]  Fax:  [phone]    Date: 04/28/2022    Patient: Philippe Hillman  YOB: 1975  MRN: [id number]     Time Calculation    Start time: 1452  Stop time: 1530 Time Calculation (min): 38 minutes         Chief Complaint: Shoulder Problem    Visit #: 16   *Pt several min late to appt today    SUBJECTIVE:  Pt reporting difficulty completing shoulder abduction.     OBJECTIVE:  Current objective measures:   See progress note      Therapeutic Exercises (CPT 84689):     6. UBE , x6 min , cardiovascular warm up    7. Review of hep, pt brought all previous hep packets; therapist reviewing and picking top 5 exercises for incr compliance    8. Pec stretch at wall, 2x60 sec, hep    9. Objective measures    10. Goals assessment     11. QuickDash completion      Therapeutic Exercise Summary: Pt performed these exercises with instruction and SPV.  Provided handout with these exercises for daily HEP.        Time-based treatments/modalities:    Physical Therapy Timed Treatment Charges  Therapeutic exercise minutes (CPT 50899): 38 minutes      Pain rating (1-10) before treatment:  0  Pain rating (1-10) after treatment:  0    ASSESSMENT:   Response to treatment: Abbreviated session due to pt several min late to appt today.      PLAN/RECOMMENDATIONS:   Plan for treatment: therapy treatment to continue next visit.  Planned interventions for next visit: continue with current treatment.  See progress note for details

## 2022-04-28 NOTE — OP THERAPY PROGRESS SUMMARY
Outpatient Physical Therapy  PROGRESS SUMMARY NOTE      [facility] Outpatient Physical Therapy  [address]  [place] [address]  Phone:  [phone]  Fax:  [phone]    Date of Visit: 04/28/2022    Patient: Philippe Hillman  YOB: 1975  MRN: [id number]     Referring Provider: Tere Gabriel P.A.-C.  [address] [place] [address]   Referring Diagnosis Adhesive capsulitis of left shoulder [M75.02]     Visit Diagnoses     ICD-10-CM   1. Adhesive capsulitis of left shoulder  M75.02       Rehab Potential: good/fair    Progress Report Period: 3/21/22-4/29/22    Functional Assessment Used  Quickdash General Total Score: 27.27       Objective Findings and Assessment:   Patient progression towards goals: Pt has not been seen since 3/31/22 resulting in gap of care due to pt returning to [place] for anniversary. He presents today with improvements in L shoulder AROM in multiple directions; admits to compliance of hep at 2x/wk. Continued hypomobility at t/s especially CTJ-T5 region. Does demonstrate some improved mobility posterior>inferior capsule when compared to RUE. Due to lengthy timeline of current condition, will follow up with pt in 3-4 weeks with continued emphasis on independent hep and reassess ROM, progressing as appropriate.    Objective findings and assessment details: Hypomobile t/s    Pain Comments:  Aggravating: elevation, all shoulder AROM to end range    Shoulder AROM-assessed in sitting Today:   R 153 flexion (130 L)  R 145 abduction (97 L)  R 70 ER (63 L)  Hand BTH R: T7 (T10 L)          30 deg extension L    Eval ROM 11/4/21:  Shoulder AROM:   R 145 flexion (104 L)  R WFL abd (76 L)  R 83 ER (42 L)  Hand BTH R: T7 (T12 L)          30 deg extension L      Goals:   Short Term Goals:   Pt will be compliant with daily HEP. (partially met; completing 2x/wk)  Pt will have 15% increased mobiity in 2 weeks. (met)    Short term goal time span:   2-4 weeks      Long Term Goals:    Pt will be able to sleep on his side with 50% or greater reduction pain to improve ADL's. (Met)  Pt will be able to play tennis with 50% or greater reduction pain for improved QOL. (met)  Pt will demonstrate 30% or greater ROM improvements into elevation.  (Partially met)  Quickdash score will improve 50%. (eval: 15.91) (not met)     Long term goal time span:  6-8 weeks     Long term goal time span:  6-8 weeks    Plan:   Planned therapy interventions:  Neuromuscular Re-education (CPT 08454), E Stim Unattended (CPT 26368), Manual Therapy (CPT 93707), Therapeutic Exercise (CPT 17098) and Therapeutic Activities (CPT 90769)  Frequency: 2x/month or fewer.  Duration in weeks:  8  Plan details:  UPOC: 6/24/22      Referring provider co-signature:  I have reviewed this plan of care and my co-signature certifies the need for services.     Certification Period: 04/28/2022 to 6/24/22    Physician Signature: ________________________________ Date: ______________

## 2022-05-27 ENCOUNTER — OFFICE VISIT (OUTPATIENT)
Dept: DERMATOLOGY | Facility: IMAGING CENTER | Age: 47
End: 2022-05-27
Payer: COMMERCIAL

## 2022-05-27 DIAGNOSIS — L80 VITILIGO: ICD-10-CM

## 2022-05-27 DIAGNOSIS — L81.9 HYPERPIGMENTATION: ICD-10-CM

## 2022-05-27 DIAGNOSIS — L30.9 DERMATITIS: ICD-10-CM

## 2022-05-27 DIAGNOSIS — L40.9 PSORIASIS: ICD-10-CM

## 2022-05-27 PROCEDURE — 99213 OFFICE O/P EST LOW 20 MIN: CPT | Performed by: DERMATOLOGY

## 2022-05-27 RX ORDER — TACROLIMUS 1 MG/G
OINTMENT TOPICAL
Qty: 30 G | Refills: 3 | Status: SHIPPED | OUTPATIENT
Start: 2022-05-27

## 2022-05-27 NOTE — PROGRESS NOTES
"CC: followup vitiligo/PSO    Subjective:Previously seen patient here for f/u vitiligo/PSO    States did not use prototic due increase hair growth, but states is spreading . Now has developed another spot on chin     psoriasis has improved but not clear. Continues to apply the lidex intermittently . Currently has patch on right wrist - has not been treating    Bothered by brown spots on face - \"appearing more aged\"    From prior notes:  \"Initial Hx   HPI/location: white patch on rt cheek   Time present: several months   Painful lesion: No  Itching lesion: No  Enlarging lesion: Yes  Anything make it better or worse?     Would like skin tags removed    psoriasis on arms been dx for 10 years, doesn't have tx because it really doesn't flare up; present today\"       History of skin cancer: No  History of biopsies:No  History of blistering/severe sunburns:No  Family history of skin cancer:No  Family history of atypical moles:No    ROS: no fevers/chills. ++ itch.  No cough  DermPMH: no skin cancer/melanoma  No problem-specific Assessment & Plan notes found for this encounter.    Relevant PMH: NC  Social: never smoker    PE: Gen:WDWN male in NAD. Skin: face depigmented patch on right facial cheek, triangular/angular in appearance - others - chin and infranasal. Hyperpigmented macules/patches on malar cheeks. Arms - few psoriasiform papules on arms.  Scant skin colored tags, pericollar dist      A/P: vitiligo, face:  -reviewed dx/tx   -protopic 0.1% oint BID-->PRN  -sunprotection to avoid sunburn; may benefit from limited sunexposure    PSO, mild, few small plaques on arms:  -reviewed triggers to avoid  -lidex cream BID PRN    Hyperpigmentation/Melasma, face:  -reviewed sunprotection - SPF 50    F/u 2-3 months, PRN  "

## 2022-08-03 ENCOUNTER — OFFICE VISIT (OUTPATIENT)
Dept: MEDICAL GROUP | Facility: LAB | Age: 47
End: 2022-08-03
Payer: COMMERCIAL

## 2022-08-03 VITALS
HEIGHT: 67 IN | TEMPERATURE: 97.6 F | BODY MASS INDEX: 22.98 KG/M2 | HEART RATE: 112 BPM | OXYGEN SATURATION: 94 % | RESPIRATION RATE: 14 BRPM | DIASTOLIC BLOOD PRESSURE: 66 MMHG | SYSTOLIC BLOOD PRESSURE: 98 MMHG | WEIGHT: 146.4 LBS

## 2022-08-03 DIAGNOSIS — M79.605 PAIN OF LEFT LOWER EXTREMITY: ICD-10-CM

## 2022-08-03 PROCEDURE — 99213 OFFICE O/P EST LOW 20 MIN: CPT | Performed by: NURSE PRACTITIONER

## 2022-08-03 ASSESSMENT — FIBROSIS 4 INDEX: FIB4 SCORE: 0.79

## 2022-08-03 NOTE — PROGRESS NOTES
"Subjective:     CC:   Chief Complaint   Patient presents with    Leg Pain     L leg / from but ox area to the heel of leg / had trouble sleeping due to pain      HPI:   Philippe presents today with the following:    Leg pain  Onset last night. He reports pain from his bottom to the heel of his foot.   He reports that it is affecting his gait. Feels like pain is improving.   He does work out regularly.  He was unable to sleep last night. Not painful, just discomfort. Denies injury, numbness, tingling, swelling, redness, warmth, masses, bruising.     ROS:   As documented in history of present illness above    Objective:     Exam: BP (!) 98/66 (BP Location: Right arm, Patient Position: Sitting, BP Cuff Size: Adult)   Pulse (!) 112   Temp 36.4 °C (97.6 °F)   Resp 14   Ht 1.702 m (5' 7\")   Wt 66.4 kg (146 lb 6.4 oz)   SpO2 94%  Body mass index is 22.93 kg/m².    Constitutional: Alert, no distress, well-groomed.  Skin: Warm, dry, good turgor, no rashes in visible areas.  Eye: Equal, round and reactive, conjunctiva clear, lids normal.  ENMT: Lips without lesions, good dentition, moist mucous membranes.  Neck: Trachea midline, no masses, no thyromegaly.  Respiratory: Unlabored respiratory effort, no cough.  MSK: Normal gait, moves all extremities.  Neuro: Grossly non-focal.   Psych: Alert and oriented x3, normal affect and mood.    Assessment & Plan:     46 y.o. male with the following -     1. Pain of left lower extremity  Patient reports that pain is improving. Able to move extremity. Discussed possibility of muscle strain.   Patient can take ibuprofen as directed for pain.   Supportive care, differential diagnoses, and indications for immediate follow-up discussed with patient. Pathogenesis of diagnosis discussed including typical length and natural progression. Instructed to return to clinic or nearest emergency department for any change in condition, further concerns, or worsening of symptoms.        "

## 2022-09-21 ENCOUNTER — TELEPHONE (OUTPATIENT)
Dept: PHYSICAL THERAPY | Facility: MEDICAL CENTER | Age: 47
End: 2022-09-21
Payer: COMMERCIAL

## 2022-09-21 NOTE — OP THERAPY DISCHARGE SUMMARY
Outpatient Physical Therapy  DISCHARGE SUMMARY NOTE      [facility] Outpatient Physical Therapy  [address]  [place] [address]  Phone:  [phone]  Fax:  [phone]    Date of Visit: 09/21/2022    Patient: Philippe Hillman  YOB: 1975  MRN: [id number]     Referring Provider: No referring provider defined for this encounter.   Referring Diagnosis No admission diagnoses are documented for this encounter.         Functional Assessment Used        Your patient is being discharged from Physical Therapy with the following comments:   Patient has failed to schedule or reschedule follow-up visits    Comments:  Pt last seen on 4/28/22 with improvements in L shoulder AROM in multiple directions; admits to compliance of hep at 2x/wk. Has not scheduled further follow ups.    Limitations Remaining:  Please see progress note on 4/28/22.     Recommendations:  Pt has not been seen in clinic >30 days. Pt has failed to schedule additional follow ups. Per policy, pt will need to be seen by PCP or acquire another referral prior to initiating skilled physical therapy. Pt is being discharged at this time.      Florentino Rivera, PT    Date: 9/21/2022

## 2022-09-23 ENCOUNTER — OFFICE VISIT (OUTPATIENT)
Dept: DERMATOLOGY | Facility: IMAGING CENTER | Age: 47
End: 2022-09-23
Payer: COMMERCIAL

## 2022-09-23 DIAGNOSIS — L81.9 HYPERPIGMENTATION: ICD-10-CM

## 2022-09-23 DIAGNOSIS — L73.9 FOLLICULITIS: ICD-10-CM

## 2022-09-23 DIAGNOSIS — L80 VITILIGO: ICD-10-CM

## 2022-09-23 DIAGNOSIS — L29.9 ITCHING: ICD-10-CM

## 2022-09-23 DIAGNOSIS — L40.9 PSORIASIS: ICD-10-CM

## 2022-09-23 PROCEDURE — 99214 OFFICE O/P EST MOD 30 MIN: CPT | Performed by: DERMATOLOGY

## 2022-09-23 PROCEDURE — RXMED WILLOW AMBULATORY MEDICATION CHARGE: Performed by: DERMATOLOGY

## 2022-09-23 RX ORDER — BETAMETHASONE DIPROPIONATE 0.5 MG/G
CREAM TOPICAL
Qty: 45 G | Refills: 1 | Status: SHIPPED | OUTPATIENT
Start: 2022-09-23

## 2022-09-23 NOTE — PROGRESS NOTES
"CC: followup vitiligo/PSO    Subjective:Previously seen patient here for f/u vitiligo/PSO    Reports improvement of vitiligo on face with \"cream use\".     Has dark spots on face, desires treatment. Sister and aunt had used an OTC topical from [place]. Unsure of ingredients but wanted to try it. Bothered by brown spots on face - \"appearing more aged\"    psoriasis has improved but not clear. Continues to come/go.  Applies the lidex cream intermittently. Currently has patch on right wrist - may be growing in size.     [facility] and wants to know if it is contributing to itch on arms, skin. Used benadryl once with some improvement in last day. Avoided use today for MD to see.       From prior notes:  \"Initial Hx   HPI/location: white patch on rt cheek   Time present: several months   Painful lesion: No  Itching lesion: No  Enlarging lesion: Yes  Anything make it better or worse?     Would like skin tags removed    psoriasis on arms been dx for 10 years, doesn't have tx because it really doesn't flare up; present today\"       History of skin cancer: No  History of biopsies:No  History of blistering/severe sunburns:No  Family history of skin cancer:No  Family history of atypical moles:No    ROS: no fevers/chills. ++ itch.  No cough  DermPMH: no skin cancer/melanoma  No problem-specific Assessment & Plan notes found for this encounter.    Relevant PMH: NC  Social: never smoker    PE: Gen:WDWN male in NAD. Skin: no notable depigmented patches on skin of face today. Hyperpigmented macules/patches on malar cheeks. Arms - few psoriasiform papules on arms - one on right wrist with mild scaling.  Few folliculocentric papules on upper back/shoulders    A/P:   Folliculitis, back/shoulders:  -advised washing immediately after hot tub use  -hibiclens as body wash Q2-3 x/week in shower    vitiligo, face:  -protopic 0.1% oint BID-->PRN  -sunprotection to avoid sunburn and reduce contrast of lighter/darker " skin    PSO, mild, few small plaques on arms:worsening  -reviewed triggers to avoid  -betamethasone cream BID PRN, cautioned re: effects from overuse. Moderate risk of morbidity    Itching: betamethasone cream BID PRN  - advised chest, back, arms, wrists, only. If itching in sens skin sites - face/genitals, advised protopic in those locations  -cont benadryl PRN    Hyperpigmentation/Melasma, face:  -reviewed sunprotection - SPF 50  -would be cautious with bleaching creams, as they may worsen vitiligo  -if tries aunt/sister's OTC product - advised caution with test spot first  -cosmetics laser/peels reviewed, though not available at this site    F/u 2-3 months, PRN

## 2022-09-28 DIAGNOSIS — M1A.09X0 IDIOPATHIC CHRONIC GOUT OF MULTIPLE SITES WITHOUT TOPHUS: ICD-10-CM

## 2022-09-28 PROCEDURE — RXMED WILLOW AMBULATORY MEDICATION CHARGE: Performed by: DERMATOLOGY

## 2022-09-28 PROCEDURE — RXMED WILLOW AMBULATORY MEDICATION CHARGE: Performed by: NURSE PRACTITIONER

## 2022-09-28 RX ORDER — COLCHICINE 0.6 MG/1
TABLET ORAL
Qty: 20 TABLET | Refills: 1 | Status: SHIPPED | OUTPATIENT
Start: 2022-09-28 | End: 2023-05-02

## 2022-09-28 RX ORDER — INDOMETHACIN 50 MG/1
CAPSULE ORAL
Qty: 90 CAPSULE | Refills: 0 | Status: SHIPPED | OUTPATIENT
Start: 2022-09-28 | End: 2023-09-01 | Stop reason: SDUPTHER

## 2022-09-28 RX ORDER — ALLOPURINOL 100 MG/1
100 TABLET ORAL DAILY
Qty: 30 TABLET | Refills: 6 | Status: CANCELLED | OUTPATIENT
Start: 2022-09-28

## 2022-09-28 NOTE — TELEPHONE ENCOUNTER
Received request via: Pharmacy    Was the patient seen in the last year in this department? Yes  LOV 08/03/2022  Does the patient have an active prescription (recently filled or refills available) for medication(s) requested? No

## 2022-10-12 ENCOUNTER — PHARMACY VISIT (OUTPATIENT)
Dept: PHARMACY | Facility: MEDICAL CENTER | Age: 47
End: 2022-10-12
Payer: COMMERCIAL

## 2022-10-27 ENCOUNTER — PHARMACY VISIT (OUTPATIENT)
Dept: PHARMACY | Facility: MEDICAL CENTER | Age: 47
End: 2022-10-27
Payer: COMMERCIAL

## 2022-10-27 PROCEDURE — RXMED WILLOW AMBULATORY MEDICATION CHARGE: Performed by: INTERNAL MEDICINE

## 2022-10-27 RX ORDER — HEPATITIS B VACCINE (RECOMBINANT) 10 UG/ML
INJECTION, SUSPENSION INTRAMUSCULAR; SUBCUTANEOUS
Qty: 1 ML | Refills: 0 | OUTPATIENT
Start: 2022-10-27

## 2022-10-27 RX ORDER — INFLUENZA A VIRUS A/BRISBANE/02/2018 IVR-190 (H1N1) ANTIGEN (FORMALDEHYDE INACTIVATED), INFLUENZA A VIRUS A/KANSAS/14/2017 X-327 (H3N2) ANTIGEN (FORMALDEHYDE INACTIVATED), INFLUENZA B VIRUS B/PHUKET/3073/2013 ANTIGEN (FORMALDEHYDE INACTIVATED), AND INFLUENZA B VIRUS B/MARYLAND/15/2016 BX-69A ANTIGEN (FORMALDEHYDE INACTIVATED) 15; 15; 15; 15 UG/.5ML; UG/.5ML; UG/.5ML; UG/.5ML
INJECTION, SUSPENSION INTRAMUSCULAR
Qty: 0.5 ML | Refills: 0 | OUTPATIENT
Start: 2022-10-27

## 2022-11-12 NOTE — ASSESSMENT & PLAN NOTE
0650 Patient's sitting up in bed. Bedside report received from CARMEL Shen RN at the beginning of the shift.    0718 New order received and acknowledged from Dr Zaman - Bladder scan and see MAR.     0740 Patient's sitting up in bed. Educated on the importance/use of IS at least 10x every hour while awake, able to reach 1250.  Rates right hip pain 2/10, declines medication at this time, ice pack given. Fall protocol in effect. Call light within reach. Reminded patient to call for assist. Assessment completed. No distress noted. Plan of care reviewed with the patient. Verbalized understanding.    0810 Patient's having abdominal US as ordered.    0823 New order received from Dr Zaman (see MAR) and labs.    0931 Medicated with Ativan (see MAR) per [facility] protocol.    1030 JOEL Alicia visited. POC discussed with the patient. New order received and acknowledged  (dressing changes).    1045 TEODORO Chauhan worked with the patient. Ambulated in the hallway with fWW then up to a chair.    1150 Dressings to right hip changes as per order.     1340 Ambulated patient in the hallway with FWW accompanied by Primary Nurse.    1440 New order received and acknowledged  from Dr Zaman (see MAR).    1523 New order received and acknowledged for the Magnesium IV to be dc'd.    1529 Medicated with Ativan (see MAR) per [facility] Protocol.    1730 Patient's sitting up in bed, having Dinner. Daughter visiting.     1930   Patient's sitting up in bed. No changes in status. Bedside report given to Jelena BURT RN (Machelle).   Patient has been going to physical therapy and he reports that it is helped with the pain but he now has what the physical therapist is calling a frozen shoulder.  He is unable to lift his arm above 45 degrees both anteriorly and laterally.  There is a family history of dermatomyositis

## 2023-01-27 ENCOUNTER — OFFICE VISIT (OUTPATIENT)
Dept: DERMATOLOGY | Facility: IMAGING CENTER | Age: 48
End: 2023-01-27
Payer: COMMERCIAL

## 2023-01-27 DIAGNOSIS — L82.1 SEBORRHEIC KERATOSIS: ICD-10-CM

## 2023-01-27 DIAGNOSIS — L30.9 DERMATITIS: ICD-10-CM

## 2023-01-27 DIAGNOSIS — L91.8 SKIN TAG: ICD-10-CM

## 2023-01-27 DIAGNOSIS — L81.1 MELASMA: ICD-10-CM

## 2023-01-27 PROCEDURE — 99213 OFFICE O/P EST LOW 20 MIN: CPT | Performed by: DERMATOLOGY

## 2023-01-27 RX ORDER — FLUOCINOLONE ACETONIDE, HYDROQUINONE, AND TRETINOIN .1; 40; .5 MG/G; MG/G; MG/G
CREAM TOPICAL
Qty: 30 G | Refills: 0 | Status: SHIPPED | OUTPATIENT
Start: 2023-01-27

## 2023-01-27 NOTE — PROGRESS NOTES
"CC: followup hyperpigmentation/PSO    NOTE: seen as a courtesy.  Checkin at 11:54 for 11:45 am appt.    Subjective:Previously seen patient here for f/u face hyperpigmentation/PSO    Has dark spots on face, desires treatment. Sister and aunt had used an OTC topical from [place]. Obrien? Unsure of ingredients but wanted to try it. Sister bothered by brown spots on face - \"appearing more aged\"    psoriasis has improved but not clear. Continues to come/go and itches.  Applies the cream intermittently, sometimes only daytime. Worried about transfer at night. Currently has patch on right wrist - improves with use, persists when cream not used.     Prev Reported improvement of vitiligo on face with \"cream use\".     Skin tags on neck, getting more    From prior notes:  \"Initial Hx   HPI/location: white patch on rt cheek   Time present: several months   Painful lesion: No  Itching lesion: No  Enlarging lesion: Yes  Anything make it better or worse?     Would like skin tags removed    psoriasis on arms been dx for 10 years, doesn't have tx because it really doesn't flare up; present today\"       History of skin cancer: No  History of biopsies:No  History of blistering/severe sunburns:No  Family history of skin cancer:No  Family history of atypical moles:No    ROS: no fevers/chills. ++ itch.  No cough  Relevant PMH: NC  Social: never smoker    PE: Gen:WDWN male in NAD. Skin: no notable depigmented patches on skin of face today. Hyperpigmented macules/patches on malar cheeks. Arms - few psoriasiform papules on arms - one on right wrist with mild scaling. Back/abd - many waxy brown papules    A/P:   vitiligo, face: appearing resolved but may be less notable in winter season due to less face tanning    Dermatitis: PSO vs eczematous dermatitis, mild, few small plaques on r arm/wrist :persistent  -reviewed strategy to reduce transfer of medication - occlusion at night, earlier application  -betamethasone cream BID PRN, cautioned " prev re: effects from overuse.     Skin tags:  -benign  -advised healthy diet/exercise  -cosmetics removal PRN    Hyperpigmentation/Melasma, face:  -reviewed sunprotection - SPF 50  -Tri-beth QHS X 12 weeks, se reviewed + SPF 50  -to send product/contents of sister's [place] cream for review by MD  -cosmetics laser/peels reviewed, though not available at this site    Sks, torso:  -benign/reassurance    F/u 3 months, PRN

## 2023-04-05 NOTE — OP THERAPY DAILY TREATMENT
Outpatient Physical Therapy  DAILY TREATMENT     [facility] Outpatient Physical Therapy  [address]  [place] [address]  Phone:  [phone]  Fax:  [phone]    Date: 04/02/2021    Patient: Philippe Hillman  YOB: 1975  MRN: [id number]     Time Calculation    Start time: 1045  Stop time: 1130 Time Calculation (min): 45 minutes         Chief Complaint: shoulder pain  Visit #: 3    SUBJECTIVE:  Pt feels like he's moving a little better.     OBJECTIVE:        Therapeutic Exercises (CPT 02328):     1. UBE 2fwd and 2 bkwd    2. Pulleys    3. Ball flexion rolls and abduction    4. Pull downs , mid rows 12# band    Therapeutic Treatments and Modalities:     1. Manual Therapy (CPT 68444), PROM to left shoulder, gh inferior and anterior glides    2. E Stim Unattended (CPT 14826), IFC and mhp to left shoulder    Time-based treatments/modalities:    Physical Therapy Timed Treatment Charges  Manual therapy minutes (CPT 62009): 15 minutes  Therapeutic exercise minutes (CPT 89351): 15 minutes        ASSESSMENT:   Post treatment flexion 113*. Pt will continue with his HEP while he is out of town.     PLAN/RECOMMENDATIONS:   Plan for treatment: therapy treatment to continue next visit.  Planned interventions for next visit: pt will be out of town for a month. He will follow up after he returns.        
intact

## 2023-04-18 ENCOUNTER — PATIENT MESSAGE (OUTPATIENT)
Dept: HEALTH INFORMATION MANAGEMENT | Facility: OTHER | Age: 48
End: 2023-04-18

## 2023-05-02 ENCOUNTER — OFFICE VISIT (OUTPATIENT)
Dept: MEDICAL GROUP | Facility: LAB | Age: 48
End: 2023-05-02
Payer: COMMERCIAL

## 2023-05-02 VITALS
DIASTOLIC BLOOD PRESSURE: 66 MMHG | WEIGHT: 154.2 LBS | OXYGEN SATURATION: 93 % | RESPIRATION RATE: 14 BRPM | HEIGHT: 67 IN | TEMPERATURE: 97.1 F | HEART RATE: 92 BPM | BODY MASS INDEX: 24.2 KG/M2 | SYSTOLIC BLOOD PRESSURE: 112 MMHG

## 2023-05-02 DIAGNOSIS — Z12.12 SCREENING FOR COLORECTAL CANCER: ICD-10-CM

## 2023-05-02 DIAGNOSIS — Z12.11 SCREENING FOR COLORECTAL CANCER: ICD-10-CM

## 2023-05-02 DIAGNOSIS — R79.89 HIGH SERUM LOW-DENSITY LIPOPROTEIN (LDL): ICD-10-CM

## 2023-05-02 DIAGNOSIS — R73.03 PREDIABETES: ICD-10-CM

## 2023-05-02 DIAGNOSIS — Z86.718 HISTORY OF DVT OF LOWER EXTREMITY: ICD-10-CM

## 2023-05-02 DIAGNOSIS — M1A.09X0 IDIOPATHIC CHRONIC GOUT OF MULTIPLE SITES WITHOUT TOPHUS: ICD-10-CM

## 2023-05-02 DIAGNOSIS — M79.661 PAIN IN RIGHT LOWER LEG: ICD-10-CM

## 2023-05-02 DIAGNOSIS — G89.29 CHRONIC LEFT SHOULDER PAIN: ICD-10-CM

## 2023-05-02 DIAGNOSIS — M25.512 CHRONIC LEFT SHOULDER PAIN: ICD-10-CM

## 2023-05-02 DIAGNOSIS — L40.9 PSORIASIS: ICD-10-CM

## 2023-05-02 PROCEDURE — 99214 OFFICE O/P EST MOD 30 MIN: CPT | Performed by: NURSE PRACTITIONER

## 2023-05-02 ASSESSMENT — PATIENT HEALTH QUESTIONNAIRE - PHQ9: CLINICAL INTERPRETATION OF PHQ2 SCORE: 0

## 2023-05-03 ENCOUNTER — HOSPITAL ENCOUNTER (OUTPATIENT)
Dept: RADIOLOGY | Facility: MEDICAL CENTER | Age: 48
End: 2023-05-03
Attending: NURSE PRACTITIONER
Payer: COMMERCIAL

## 2023-05-03 DIAGNOSIS — Z86.718 HISTORY OF DVT OF LOWER EXTREMITY: ICD-10-CM

## 2023-05-03 DIAGNOSIS — M79.661 PAIN IN RIGHT LOWER LEG: ICD-10-CM

## 2023-05-03 PROCEDURE — 93971 EXTREMITY STUDY: CPT | Mod: RT

## 2023-05-03 NOTE — ASSESSMENT & PLAN NOTE
Followed by dermatology. Patient has been managing with topical steroids when he has a flare. No changes to nails, arthritic symptoms.

## 2023-05-03 NOTE — ASSESSMENT & PLAN NOTE
Stable. In the past he was taking allopurinol, but has recently been managing with lifestyle modifications. Denies recent flare-ups.

## 2023-05-03 NOTE — ASSESSMENT & PLAN NOTE
Last a1c was 5.7 in 2021. Currently working on lifestyle modifications including diet and exercise. Denies any unexplained weight loss, polyuria, polydipsia.

## 2023-05-03 NOTE — PROGRESS NOTES
"Subjective:     CC:   Chief Complaint   Patient presents with    Establish Care    Leg Pain     R side thigh pain      HPI:   Philippe presents today with the following:    Left leg pain  Patient travelled on Saturday. Since then he has had some pain/pressure in his right lower leg, mostly behind the knee. Denies redness, swelling. He does have a history of DVT in the same area about 2 years ago. He did increase his dosage of ASA before and after flying. Denies chest pain, shortness of breath.     Chronic idiopathic gout of multiple sites  Stable. In the past he was taking allopurinol, but has recently been managing with lifestyle modifications. Denies recent flare-ups.     Psoriasis  Followed by dermatology. Patient has been managing with topical steroids when he has a flare. No changes to nails, arthritic symptoms.     Prediabetes  Last a1c was 5.7 in 2021. Currently working on lifestyle modifications including diet and exercise. Denies any unexplained weight loss, polyuria, polydipsia.     High serum low-density lipoprotein (LDL)  Chronic condition. Reviewed labs. Patient currently managing with lifestyle modifications. The 10-year ASCVD risk score (Ethan DK, et al., 2019) is: 2.3%.  Denies chest pain, shortness of breath, heart palpitations.    Lab Results   Component Value Date/Time    CHOLSTRLTOT 181 07/28/2021 08:34 AM     (H) 07/28/2021 08:34 AM    HDL 46 07/28/2021 08:34 AM    TRIGLYCERIDE 120 07/28/2021 08:34 AM        Chronic left shoulder pain  Ongoing for several years. Has tried PT in the past with relief and would like another referral at this time.    ROS:   As documented in history of present illness above    Objective:     Exam: /66 (BP Location: Right arm, Patient Position: Sitting, BP Cuff Size: Adult)   Pulse 92   Temp 36.2 °C (97.1 °F)   Resp 14   Ht 1.702 m (5' 7\")   Wt 69.9 kg (154 lb 3.2 oz)   SpO2 93%  Body mass index is 24.15 kg/m².    Constitutional: Alert, no distress, " well-groomed.  Skin: Warm, dry, good turgor, no rashes in visible areas.  Eye: Equal, round and reactive, conjunctiva clear, lids normal.  ENMT: Lips without lesions, good dentition, moist mucous membranes.  Neck: Trachea midline, no masses, no thyromegaly.  Respiratory: Unlabored respiratory effort, no cough. Clear to ausculation. No rales, ronchi, or wheezing.  Cardiovascular: Regular rate and rhythm without murmur. Carotid and radial pulses are intact and equal bilaterally.  MSK: Normal gait, moves all extremities.  Neuro: Grossly non-focal.   Psych: Alert and oriented x3, normal affect and mood.    Assessment & Plan:     47 y.o. male with the following -     1. Pain in right lower leg  2. History of DVT of lower extremity  US ordered. Use over-the-counter pain reliever, such as acetaminophen (Tylenol), ibuprofen (Advil, Motrin) or naproxen (Aleve) as needed; follow package directions for dosing. Supportive care, differential diagnoses, and indications for immediate follow-up discussed with patient. Instructed to return to clinic or nearest emergency department for any change in condition, further concerns, or worsening of symptoms.  - US-EXTREMITY VENOUS LOWER UNILAT RIGHT; Future    3. Idiopathic chronic gout of multiple sites without tophus  Chronic, stable condition. Patient to take medication as prescribed. Risk factors including alcohol use, hypertension, obesity, and diuretics. Lifestyle Interventions include reducing alcoholic drinks (particularly beer), meat (especially red meat, wild game, and organ meat), some seafood (shellfish, large saltwater fish), fruit juice, beverages sweetened with high-fructose corn syrup.  - URIC ACID; Future    4. Chronic left shoulder pain  Referral placed to physical therapy.   - Referral to Physical Therapy    5. Psoriasis  Continue medication as prescribed. Continue to follow up with dermatology.     6. Prediabetes  Chronic condition. Labs as indicated. Advised to reduce  sugar/carbohydrate/alcohol, eat more vegetables and lean meats such as fish/chicken/turkey. Recommend 30 minutes of cardiovascular exercise most days of the week. Continue to monitor.  - CBC WITH DIFFERENTIAL; Future  - Comp Metabolic Panel; Future  - Lipid Profile; Future  - HEMOGLOBIN A1C; Future  - TSH WITH REFLEX TO FT4; Future    7. High serum low-density lipoprotein (LDL)  Chronic condition. Labs as indicated.  Continue lifestyle modifications.  - CBC WITH DIFFERENTIAL; Future  - Comp Metabolic Panel; Future  - Lipid Profile; Future    9. Screening for colorectal cancer  Colonoscopy ordered.   - Referral to GI for Colonoscopy

## 2023-05-08 NOTE — ASSESSMENT & PLAN NOTE
Ongoing for several years. Has tried PT in the past with relief and would like another referral at this time.

## 2023-05-08 NOTE — ASSESSMENT & PLAN NOTE
Chronic condition. Reviewed labs. Patient currently managing with lifestyle modifications. The 10-year ASCVD risk score (Ethan LEE, et al., 2019) is: 2.3%.  Denies chest pain, shortness of breath, heart palpitations.    Lab Results   Component Value Date/Time    CHOLSTRLTOT 181 07/28/2021 08:34 AM     (H) 07/28/2021 08:34 AM    HDL 46 07/28/2021 08:34 AM    TRIGLYCERIDE 120 07/28/2021 08:34 AM

## 2023-06-19 ENCOUNTER — APPOINTMENT (OUTPATIENT)
Dept: PHYSICAL THERAPY | Facility: MEDICAL CENTER | Age: 48
End: 2023-06-19
Attending: NURSE PRACTITIONER
Payer: COMMERCIAL

## 2023-06-26 ENCOUNTER — APPOINTMENT (OUTPATIENT)
Dept: PHYSICAL THERAPY | Facility: MEDICAL CENTER | Age: 48
End: 2023-06-26
Attending: NURSE PRACTITIONER
Payer: COMMERCIAL

## 2023-07-03 ENCOUNTER — APPOINTMENT (OUTPATIENT)
Dept: PHYSICAL THERAPY | Facility: MEDICAL CENTER | Age: 48
End: 2023-07-03
Attending: NURSE PRACTITIONER
Payer: COMMERCIAL

## 2023-07-10 ENCOUNTER — APPOINTMENT (OUTPATIENT)
Dept: PHYSICAL THERAPY | Facility: MEDICAL CENTER | Age: 48
End: 2023-07-10
Attending: NURSE PRACTITIONER
Payer: COMMERCIAL

## 2023-07-14 ENCOUNTER — DOCUMENTATION (OUTPATIENT)
Dept: HEALTH INFORMATION MANAGEMENT | Facility: OTHER | Age: 48
End: 2023-07-14
Payer: COMMERCIAL

## 2023-07-17 ENCOUNTER — APPOINTMENT (OUTPATIENT)
Dept: PHYSICAL THERAPY | Facility: MEDICAL CENTER | Age: 48
End: 2023-07-17
Attending: NURSE PRACTITIONER
Payer: COMMERCIAL

## 2023-07-24 ENCOUNTER — APPOINTMENT (OUTPATIENT)
Dept: PHYSICAL THERAPY | Facility: MEDICAL CENTER | Age: 48
End: 2023-07-24
Attending: NURSE PRACTITIONER
Payer: COMMERCIAL

## 2023-07-31 ENCOUNTER — APPOINTMENT (OUTPATIENT)
Dept: PHYSICAL THERAPY | Facility: MEDICAL CENTER | Age: 48
End: 2023-07-31
Attending: NURSE PRACTITIONER
Payer: COMMERCIAL

## 2023-08-07 ENCOUNTER — APPOINTMENT (OUTPATIENT)
Dept: PHYSICAL THERAPY | Facility: MEDICAL CENTER | Age: 48
End: 2023-08-07
Attending: NURSE PRACTITIONER
Payer: COMMERCIAL

## 2023-08-30 ENCOUNTER — HOSPITAL ENCOUNTER (OUTPATIENT)
Dept: LAB | Facility: MEDICAL CENTER | Age: 48
End: 2023-08-30
Attending: NURSE PRACTITIONER
Payer: COMMERCIAL

## 2023-08-30 DIAGNOSIS — M1A.09X0 IDIOPATHIC CHRONIC GOUT OF MULTIPLE SITES WITHOUT TOPHUS: ICD-10-CM

## 2023-08-30 DIAGNOSIS — R79.89 HIGH SERUM LOW-DENSITY LIPOPROTEIN (LDL): ICD-10-CM

## 2023-08-30 DIAGNOSIS — R73.03 PREDIABETES: ICD-10-CM

## 2023-08-30 LAB
ALBUMIN SERPL BCP-MCNC: 4.6 G/DL (ref 3.2–4.9)
ALBUMIN/GLOB SERPL: 1.6 G/DL
ALP SERPL-CCNC: 97 U/L (ref 30–99)
ALT SERPL-CCNC: 33 U/L (ref 2–50)
ANION GAP SERPL CALC-SCNC: 12 MMOL/L (ref 7–16)
AST SERPL-CCNC: 22 U/L (ref 12–45)
BASOPHILS # BLD AUTO: 0.9 % (ref 0–1.8)
BASOPHILS # BLD: 0.06 K/UL (ref 0–0.12)
BILIRUB SERPL-MCNC: 0.4 MG/DL (ref 0.1–1.5)
BUN SERPL-MCNC: 15 MG/DL (ref 8–22)
CALCIUM ALBUM COR SERPL-MCNC: 8.7 MG/DL (ref 8.5–10.5)
CALCIUM SERPL-MCNC: 9.2 MG/DL (ref 8.4–10.2)
CHLORIDE SERPL-SCNC: 101 MMOL/L (ref 96–112)
CHOLEST SERPL-MCNC: 179 MG/DL (ref 100–199)
CO2 SERPL-SCNC: 26 MMOL/L (ref 20–33)
CREAT SERPL-MCNC: 0.96 MG/DL (ref 0.5–1.4)
EOSINOPHIL # BLD AUTO: 0.06 K/UL (ref 0–0.51)
EOSINOPHIL NFR BLD: 0.9 % (ref 0–6.9)
ERYTHROCYTE [DISTWIDTH] IN BLOOD BY AUTOMATED COUNT: 37.4 FL (ref 35.9–50)
EST. AVERAGE GLUCOSE BLD GHB EST-MCNC: 114 MG/DL
FASTING STATUS PATIENT QL REPORTED: NORMAL
GFR SERPLBLD CREATININE-BSD FMLA CKD-EPI: 96 ML/MIN/1.73 M 2
GLOBULIN SER CALC-MCNC: 2.9 G/DL (ref 1.9–3.5)
GLUCOSE SERPL-MCNC: 90 MG/DL (ref 65–99)
HBA1C MFR BLD: 5.6 % (ref 4–5.6)
HCT VFR BLD AUTO: 43.4 % (ref 42–52)
HDLC SERPL-MCNC: 43 MG/DL
HGB BLD-MCNC: 14.9 G/DL (ref 14–18)
IMM GRANULOCYTES # BLD AUTO: 0.01 K/UL (ref 0–0.11)
IMM GRANULOCYTES NFR BLD AUTO: 0.1 % (ref 0–0.9)
LDLC SERPL CALC-MCNC: 119 MG/DL
LYMPHOCYTES # BLD AUTO: 2.5 K/UL (ref 1–4.8)
LYMPHOCYTES NFR BLD: 37.2 % (ref 22–41)
MCH RBC QN AUTO: 30.6 PG (ref 27–33)
MCHC RBC AUTO-ENTMCNC: 34.3 G/DL (ref 32.3–36.5)
MCV RBC AUTO: 89.1 FL (ref 81.4–97.8)
MONOCYTES # BLD AUTO: 0.44 K/UL (ref 0–0.85)
MONOCYTES NFR BLD AUTO: 6.5 % (ref 0–13.4)
NEUTROPHILS # BLD AUTO: 3.65 K/UL (ref 1.82–7.42)
NEUTROPHILS NFR BLD: 54.4 % (ref 44–72)
NRBC # BLD AUTO: 0 K/UL
NRBC BLD-RTO: 0 /100 WBC (ref 0–0.2)
PLATELET # BLD AUTO: 307 K/UL (ref 164–446)
PMV BLD AUTO: 8.8 FL (ref 9–12.9)
POTASSIUM SERPL-SCNC: 3.8 MMOL/L (ref 3.6–5.5)
PROT SERPL-MCNC: 7.5 G/DL (ref 6–8.2)
RBC # BLD AUTO: 4.87 M/UL (ref 4.7–6.1)
SODIUM SERPL-SCNC: 139 MMOL/L (ref 135–145)
TRIGL SERPL-MCNC: 87 MG/DL (ref 0–149)
TSH SERPL DL<=0.005 MIU/L-ACNC: 1.13 UIU/ML (ref 0.38–5.33)
URATE SERPL-MCNC: 8.7 MG/DL (ref 2.5–8.3)
WBC # BLD AUTO: 6.7 K/UL (ref 4.8–10.8)

## 2023-08-30 PROCEDURE — 83036 HEMOGLOBIN GLYCOSYLATED A1C: CPT

## 2023-08-30 PROCEDURE — 84443 ASSAY THYROID STIM HORMONE: CPT

## 2023-08-30 PROCEDURE — 36415 COLL VENOUS BLD VENIPUNCTURE: CPT

## 2023-08-30 PROCEDURE — 85025 COMPLETE CBC W/AUTO DIFF WBC: CPT

## 2023-08-30 PROCEDURE — 80053 COMPREHEN METABOLIC PANEL: CPT

## 2023-08-30 PROCEDURE — 80061 LIPID PANEL: CPT

## 2023-08-30 PROCEDURE — 84550 ASSAY OF BLOOD/URIC ACID: CPT

## 2023-09-01 ENCOUNTER — PATIENT MESSAGE (OUTPATIENT)
Dept: MEDICAL GROUP | Facility: LAB | Age: 48
End: 2023-09-01
Payer: COMMERCIAL

## 2023-09-01 DIAGNOSIS — M1A.09X0 IDIOPATHIC CHRONIC GOUT OF MULTIPLE SITES WITHOUT TOPHUS: ICD-10-CM

## 2023-09-05 RX ORDER — ALLOPURINOL 100 MG/1
TABLET ORAL
Qty: 63 TABLET | Refills: 0 | Status: SHIPPED | OUTPATIENT
Start: 2023-09-05 | End: 2023-10-05

## 2023-09-05 RX ORDER — INDOMETHACIN 50 MG/1
CAPSULE ORAL
Qty: 90 CAPSULE | Refills: 0 | Status: SHIPPED | OUTPATIENT
Start: 2023-09-05

## 2023-09-05 NOTE — TELEPHONE ENCOUNTER
Received request via: Pharmacy    Was the patient seen in the last year in this department? Yes  LOV : 5/2/2023   Does the patient have an active prescription (recently filled or refills available) for medication(s) requested? No    Does the patient have care home Plus and need 100 day supply (blood pressure, diabetes and cholesterol meds only)? Patient does not have SCP

## 2023-09-06 ENCOUNTER — PATIENT MESSAGE (OUTPATIENT)
Dept: MEDICAL GROUP | Facility: LAB | Age: 48
End: 2023-09-06

## 2023-09-06 DIAGNOSIS — M1A.09X0 IDIOPATHIC CHRONIC GOUT OF MULTIPLE SITES WITHOUT TOPHUS: ICD-10-CM

## 2023-09-07 ENCOUNTER — PHARMACY VISIT (OUTPATIENT)
Dept: PHARMACY | Facility: MEDICAL CENTER | Age: 48
End: 2023-09-07
Payer: COMMERCIAL

## 2023-09-07 PROCEDURE — RXMED WILLOW AMBULATORY MEDICATION CHARGE: Performed by: NURSE PRACTITIONER

## 2023-09-07 PROCEDURE — RXMED WILLOW AMBULATORY MEDICATION CHARGE: Performed by: DERMATOLOGY

## 2023-09-11 ENCOUNTER — PHARMACY VISIT (OUTPATIENT)
Dept: PHARMACY | Facility: MEDICAL CENTER | Age: 48
End: 2023-09-11

## 2023-10-09 RX ORDER — ALLOPURINOL 300 MG/1
300 TABLET ORAL DAILY
Qty: 90 TABLET | Refills: 3 | Status: SHIPPED | OUTPATIENT
Start: 2023-10-09

## 2023-10-10 ENCOUNTER — PHARMACY VISIT (OUTPATIENT)
Dept: PHARMACY | Facility: MEDICAL CENTER | Age: 48
End: 2023-10-10
Payer: COMMERCIAL

## 2023-10-10 PROCEDURE — RXMED WILLOW AMBULATORY MEDICATION CHARGE: Performed by: INTERNAL MEDICINE

## 2023-10-10 PROCEDURE — RXMED WILLOW AMBULATORY MEDICATION CHARGE: Performed by: NURSE PRACTITIONER

## 2023-10-10 RX ORDER — INFLUENZA A VIRUS A/BRISBANE/02/2018 IVR-190 (H1N1) ANTIGEN (FORMALDEHYDE INACTIVATED), INFLUENZA A VIRUS A/KANSAS/14/2017 X-327 (H3N2) ANTIGEN (FORMALDEHYDE INACTIVATED), INFLUENZA B VIRUS B/PHUKET/3073/2013 ANTIGEN (FORMALDEHYDE INACTIVATED), AND INFLUENZA B VIRUS B/MARYLAND/15/2016 BX-69A ANTIGEN (FORMALDEHYDE INACTIVATED) 15; 15; 15; 15 UG/.5ML; UG/.5ML; UG/.5ML; UG/.5ML
0.5 INJECTION, SUSPENSION INTRAMUSCULAR
Qty: 0.5 ML | Refills: 0 | OUTPATIENT
Start: 2023-10-10

## 2023-10-10 RX ORDER — COVID-19 VACCINE, MRNA 0.23 MG/2.25ML
0.3 INJECTION, SUSPENSION INTRAMUSCULAR
Qty: 0.3 ML | Refills: 0 | OUTPATIENT
Start: 2023-10-10

## 2023-12-01 ENCOUNTER — OFFICE VISIT (OUTPATIENT)
Dept: MEDICAL GROUP | Age: 48
End: 2023-12-01
Payer: COMMERCIAL

## 2023-12-01 VITALS
RESPIRATION RATE: 20 BRPM | WEIGHT: 154 LBS | OXYGEN SATURATION: 94 % | SYSTOLIC BLOOD PRESSURE: 118 MMHG | DIASTOLIC BLOOD PRESSURE: 76 MMHG | HEART RATE: 122 BPM | TEMPERATURE: 100.1 F | HEIGHT: 67 IN | BODY MASS INDEX: 24.17 KG/M2

## 2023-12-01 DIAGNOSIS — U07.1 COVID-19: ICD-10-CM

## 2023-12-01 DIAGNOSIS — R68.89 FLU-LIKE SYMPTOMS: ICD-10-CM

## 2023-12-01 LAB
FLUAV RNA SPEC QL NAA+PROBE: NEGATIVE
FLUBV RNA SPEC QL NAA+PROBE: NEGATIVE
RSV RNA SPEC QL NAA+PROBE: NEGATIVE
SARS-COV-2 RNA RESP QL NAA+PROBE: POSITIVE

## 2023-12-01 PROCEDURE — 3078F DIAST BP <80 MM HG: CPT | Performed by: STUDENT IN AN ORGANIZED HEALTH CARE EDUCATION/TRAINING PROGRAM

## 2023-12-01 PROCEDURE — 0241U POCT CEPHEID COV-2, FLU A/B, RSV - PCR: CPT | Performed by: STUDENT IN AN ORGANIZED HEALTH CARE EDUCATION/TRAINING PROGRAM

## 2023-12-01 PROCEDURE — 99213 OFFICE O/P EST LOW 20 MIN: CPT | Performed by: STUDENT IN AN ORGANIZED HEALTH CARE EDUCATION/TRAINING PROGRAM

## 2023-12-01 PROCEDURE — 3074F SYST BP LT 130 MM HG: CPT | Performed by: STUDENT IN AN ORGANIZED HEALTH CARE EDUCATION/TRAINING PROGRAM

## 2023-12-01 ASSESSMENT — ENCOUNTER SYMPTOMS
PALPITATIONS: 0
COUGH: 1
BLOOD IN STOOL: 0
SHORTNESS OF BREATH: 0
DIZZINESS: 0
SPUTUM PRODUCTION: 0
BLURRED VISION: 0
NECK PAIN: 0
HEADACHES: 0
DOUBLE VISION: 0
VOMITING: 0
MYALGIAS: 1
HEARTBURN: 0
WEAKNESS: 0
WHEEZING: 0
PHOTOPHOBIA: 0
FEVER: 0
BACK PAIN: 0
SORE THROAT: 1
CHILLS: 1
BRUISES/BLEEDS EASILY: 0
DIAPHORESIS: 0
NAUSEA: 0
HEMOPTYSIS: 0
DEPRESSION: 0

## 2023-12-01 ASSESSMENT — FIBROSIS 4 INDEX: FIB4 SCORE: 0.62

## 2023-12-01 NOTE — PATIENT INSTRUCTIONS
- Symptomatic treatment for now  - Ill call you once results available from swab  - If HR is constant > 130 go to the ED or if SOB or low O2 sat <90%

## 2023-12-01 NOTE — PROGRESS NOTES
"Subjective:     CC: Flu like symptoms    HPI:   Philippe presents today for chills, fatigue, mild muscle aches, mild runny nose and also sporadic dry cough, raspy throat and change in his voice.  He was able to work Wednesday and Thursday.   Denies loss of taste or smelling, headaches or fevers.   On thanksgiving day he visited family. His sister was not feeling well, he took precautions with distancing and was found out later next week that she had covid.      Health Maintenance: Completed    ROS:  Review of Systems   Constitutional:  Positive for chills and malaise/fatigue. Negative for diaphoresis and fever.   HENT:  Positive for congestion and sore throat. Negative for ear pain, hearing loss and tinnitus.    Eyes:  Negative for blurred vision, double vision and photophobia.   Respiratory:  Positive for cough. Negative for hemoptysis, sputum production, shortness of breath and wheezing.    Cardiovascular:  Negative for chest pain and palpitations.   Gastrointestinal:  Negative for blood in stool, heartburn, melena, nausea and vomiting.   Genitourinary:  Negative for dysuria and urgency.   Musculoskeletal:  Positive for myalgias. Negative for back pain, joint pain and neck pain.   Skin:  Negative for itching and rash.   Neurological:  Negative for dizziness, weakness and headaches.   Endo/Heme/Allergies:  Does not bruise/bleed easily.   Psychiatric/Behavioral:  Negative for depression and suicidal ideas.        Objective:     Exam:  /76   Pulse (!) 122   Temp 37.8 °C (100.1 °F) (Temporal)   Resp 20   Ht 1.702 m (5' 7\")   Wt 69.9 kg (154 lb)   SpO2 94%   BMI 24.12 kg/m²  Body mass index is 24.12 kg/m².    Physical Exam  Vitals reviewed.   Constitutional:       General: He is not in acute distress.  HENT:      Head: Normocephalic and atraumatic.      Right Ear: Tympanic membrane and ear canal normal.      Left Ear: Tympanic membrane and ear canal normal.      Nose: Congestion present. No rhinorrhea.      " Mouth/Throat:      Mouth: Mucous membranes are moist.      Pharynx: Oropharynx is clear. No oropharyngeal exudate or posterior oropharyngeal erythema.   Eyes:      General: No scleral icterus.     Extraocular Movements: Extraocular movements intact.      Pupils: Pupils are equal, round, and reactive to light.   Cardiovascular:      Rate and Rhythm: Regular rhythm. Tachycardia present.      Pulses: Normal pulses.      Heart sounds: Normal heart sounds. No murmur heard.  Pulmonary:      Effort: Pulmonary effort is normal. No respiratory distress.      Breath sounds: Normal breath sounds. No wheezing.   Abdominal:      General: Bowel sounds are normal. There is no distension.      Palpations: Abdomen is soft.      Tenderness: There is no abdominal tenderness. There is no guarding or rebound.   Musculoskeletal:      Cervical back: Normal range of motion and neck supple. No rigidity or tenderness.      Right lower leg: No edema.      Left lower leg: No edema.   Lymphadenopathy:      Cervical: No cervical adenopathy.   Skin:     General: Skin is warm.      Capillary Refill: Capillary refill takes less than 2 seconds.      Findings: No bruising or erythema.   Neurological:      General: No focal deficit present.      Mental Status: He is alert.      Cranial Nerves: No cranial nerve deficit.      Sensory: No sensory deficit.   Psychiatric:         Mood and Affect: Mood normal.         A chaperone was offered to the patient during today's exam.: Patient declined.    Labs: Nose swab     Assessment & Plan:     48 y.o. male with the following -     Covid-19 infection  2. Flu-like symptoms  -  See HPI for symptoms  - Onset Wednesday but was able to work till Thursday   - today worse   - Sick contact thanksgiving day (sister) but kept distance   - Sister diagnosed with covid days later and recovered  - Patient is vaccinated for covid and is not high risk  - Awaiting swab results  - On exam Tachycardic -122  - End of visit   and temp 100.1 F, PO2: 95% RA  - He seems comfortable no distress  - Continue symptomatic treatment   - If HR does not go down under 110 within the next 2-3 hrs or SOB or low O2 <90 % go to the ED.  - Test came back Positive for COVID-19 I called patient at 2:05 pm to let him about results  - He will buy a Pulse Oximeter reader to monitor Oxygen and HD and I will call him at 5 pm this evening to see if HR has come down.  - I explained him that if HR still high might be need to go to the ED and likely need IVF and short term monitoring  - He will continue Symptomatic treatment  - Plaxlovid not recommended since he is not > 65 and he has mild symptoms and does not have any significant medical problems and his has had all the boosters for Covid-19      - POCT Cepheid CoV-2, Flu A/B, RSV - PCR         Referral for genetic research was offered. Patient declined.        Return if symptoms worsen or fail to improve.    Please note that this dictation was created using voice recognition software. I have made every reasonable attempt to correct obvious errors, but I expect that there are errors of grammar and possibly content that I did not discover before finalizing the note.

## 2023-12-02 ENCOUNTER — TELEPHONE (OUTPATIENT)
Dept: MEDICAL GROUP | Age: 48
End: 2023-12-02
Payer: COMMERCIAL

## 2023-12-02 NOTE — TELEPHONE ENCOUNTER
I talked to Mr. Hillman yesterday 12/1/23 at 5 pm to check on his HR and O2 sat e stated that HR were in the 110's and 95-96% O2 sat.  This morning 12/2/23 I called him again at around 10:30 am to check on him again. He stated that his HR last night at 10 pm before going to bed was around 110.  This morning his HR was 102-104 and his O2 sat 96%.  He is not feeling worse at all he just had a mild headache this morning but otherwise improving.  I reminded him again that it would require to go to the ED if he gets hypoxic low O2 sat < 90% or sustain HR >130's.

## 2023-12-04 ENCOUNTER — TELEPHONE (OUTPATIENT)
Dept: MEDICAL GROUP | Age: 48
End: 2023-12-04
Payer: COMMERCIAL

## 2023-12-04 NOTE — TELEPHONE ENCOUNTER
I spoke to Mr. Hillman over the phone. He is feeling much better, also HR has improved last night before bed in the 90's and O2 sat 96%.  Today while talking with him over the phone HR is 88-90 and O2 sat 96%.  He is recovering well feeling a lot better and should expected to recover completely within the next several days.

## 2024-01-19 ENCOUNTER — PHARMACY VISIT (OUTPATIENT)
Dept: PHARMACY | Facility: MEDICAL CENTER | Age: 49
End: 2024-01-19
Payer: COMMERCIAL

## 2024-01-19 PROCEDURE — RXMED WILLOW AMBULATORY MEDICATION CHARGE: Performed by: NURSE PRACTITIONER

## 2024-04-30 DIAGNOSIS — M1A.09X0 IDIOPATHIC CHRONIC GOUT OF MULTIPLE SITES WITHOUT TOPHUS: ICD-10-CM

## 2024-04-30 RX ORDER — ALLOPURINOL 300 MG/1
300 TABLET ORAL DAILY
Qty: 90 TABLET | Refills: 3 | Status: SHIPPED | OUTPATIENT
Start: 2024-04-30

## 2024-04-30 RX ORDER — ALLOPURINOL 100 MG/1
TABLET ORAL
Qty: 63 TABLET | Refills: 0 | Status: CANCELLED | OUTPATIENT
Start: 2024-04-30 | End: 2024-05-27

## 2024-04-30 NOTE — TELEPHONE ENCOUNTER
Received request via: Pharmacy    Was the patient seen in the last year in this department? Yes  LOV : 5/2/2023   Does the patient have an active prescription (recently filled or refills available) for medication(s) requested? No    Pharmacy Name: [facility]    Does the patient have skilled nursing Plus and need 100 day supply (blood pressure, diabetes and cholesterol meds only)? Patient does not have SCP

## 2024-08-23 ENCOUNTER — PHARMACY VISIT (OUTPATIENT)
Dept: PHARMACY | Facility: MEDICAL CENTER | Age: 49
End: 2024-08-23
Payer: COMMERCIAL

## 2024-08-23 DIAGNOSIS — M1A.09X0 IDIOPATHIC CHRONIC GOUT OF MULTIPLE SITES WITHOUT TOPHUS: ICD-10-CM

## 2024-08-23 PROCEDURE — RXMED WILLOW AMBULATORY MEDICATION CHARGE: Performed by: NURSE PRACTITIONER

## 2024-08-23 NOTE — TELEPHONE ENCOUNTER
Received request via: Pharmacy    Was the patient seen in the last year in this department? Yes    Does the patient have an active prescription (recently filled or refills available) for medication(s) requested? No    [facility] Name[facility]    Does the patient have senior living Plus and need 100-day supply? (This applies to ALL medications)

## 2024-08-26 PROCEDURE — RXMED WILLOW AMBULATORY MEDICATION CHARGE: Performed by: NURSE PRACTITIONER

## 2024-08-26 RX ORDER — INDOMETHACIN 50 MG/1
CAPSULE ORAL
Qty: 90 CAPSULE | Refills: 0 | Status: SHIPPED | OUTPATIENT
Start: 2024-08-26

## 2024-09-11 ENCOUNTER — PHARMACY VISIT (OUTPATIENT)
Dept: PHARMACY | Facility: MEDICAL CENTER | Age: 49
End: 2024-09-11
Payer: COMMERCIAL

## 2024-09-11 PROCEDURE — RXMED WILLOW AMBULATORY MEDICATION CHARGE: Performed by: INTERNAL MEDICINE

## 2024-09-11 RX ORDER — COVID-19 VACCINE, MRNA 0.04 MG/.418ML
0.3 INJECTION, SUSPENSION INTRAMUSCULAR
Qty: 0.3 ML | Refills: 0 | OUTPATIENT
Start: 2024-09-11

## 2024-09-11 RX ORDER — INFLUENZA A VIRUS A/VICTORIA/4897/2022 IVR-238 (H1N1) ANTIGEN (FORMALDEHYDE INACTIVATED), INFLUENZA A VIRUS A/CALIFORNIA/122/2022 SAN-022 (H3N2) ANTIGEN (FORMALDEHYDE INACTIVATED), AND INFLUENZA B VIRUS B/MICHIGAN/01/2021 ANTIGEN (FORMALDEHYDE INACTIVATED) 15; 15; 15 MG/.5ML; MG/.5ML; MG/.5ML
0.5 INJECTION, SUSPENSION INTRAMUSCULAR
Qty: 0.5 ML | Refills: 0 | OUTPATIENT
Start: 2024-09-11

## 2024-11-14 DIAGNOSIS — M1A.09X0 IDIOPATHIC CHRONIC GOUT OF MULTIPLE SITES WITHOUT TOPHUS: ICD-10-CM

## 2024-11-14 PROCEDURE — RXMED WILLOW AMBULATORY MEDICATION CHARGE: Performed by: NURSE PRACTITIONER

## 2024-11-14 RX ORDER — ALLOPURINOL 300 MG/1
300 TABLET ORAL DAILY
Qty: 90 TABLET | Refills: 3 | Status: SHIPPED | OUTPATIENT
Start: 2024-11-14

## 2024-11-14 NOTE — TELEPHONE ENCOUNTER
Received request via: Pharmacy    Was the patient seen in the last year in this department? No  LOV : 5/2/2023   Does the patient have an active prescription (recently filled or refills available) for medication(s) requested? No    Pharmacy Name: [facility]     Does the patient have custodial Plus and need 100-day supply? (This applies to ALL medications) Patient does not have SCP

## 2024-11-15 ENCOUNTER — OFFICE VISIT (OUTPATIENT)
Dept: MEDICAL GROUP | Facility: MEDICAL CENTER | Age: 49
End: 2024-11-15
Payer: COMMERCIAL

## 2024-11-15 ENCOUNTER — PHARMACY VISIT (OUTPATIENT)
Dept: PHARMACY | Facility: MEDICAL CENTER | Age: 49
End: 2024-11-15
Payer: COMMERCIAL

## 2024-11-15 ENCOUNTER — HOSPITAL ENCOUNTER (OUTPATIENT)
Dept: LAB | Facility: MEDICAL CENTER | Age: 49
End: 2024-11-15
Attending: PHYSICIAN ASSISTANT
Payer: COMMERCIAL

## 2024-11-15 VITALS
TEMPERATURE: 97.6 F | WEIGHT: 157.8 LBS | HEIGHT: 67 IN | HEART RATE: 98 BPM | OXYGEN SATURATION: 96 % | SYSTOLIC BLOOD PRESSURE: 102 MMHG | BODY MASS INDEX: 24.77 KG/M2 | DIASTOLIC BLOOD PRESSURE: 62 MMHG

## 2024-11-15 DIAGNOSIS — Z11.3 SCREEN FOR STD (SEXUALLY TRANSMITTED DISEASE): ICD-10-CM

## 2024-11-15 DIAGNOSIS — M1A.09X0 IDIOPATHIC CHRONIC GOUT OF MULTIPLE SITES WITHOUT TOPHUS: ICD-10-CM

## 2024-11-15 DIAGNOSIS — Z12.5 ENCOUNTER FOR SCREENING PROSTATE SPECIFIC ANTIGEN (PSA) MEASUREMENT: ICD-10-CM

## 2024-11-15 DIAGNOSIS — Z00.00 PREVENTATIVE HEALTH CARE: ICD-10-CM

## 2024-11-15 DIAGNOSIS — Z12.11 SCREENING FOR COLORECTAL CANCER: ICD-10-CM

## 2024-11-15 DIAGNOSIS — Z12.12 SCREENING FOR COLORECTAL CANCER: ICD-10-CM

## 2024-11-15 LAB
ALBUMIN SERPL BCP-MCNC: 4.5 G/DL (ref 3.2–4.9)
ALBUMIN/GLOB SERPL: 1.3 G/DL
ALP SERPL-CCNC: 91 U/L (ref 30–99)
ALT SERPL-CCNC: 28 U/L (ref 2–50)
ANION GAP SERPL CALC-SCNC: 11 MMOL/L (ref 7–16)
AST SERPL-CCNC: 18 U/L (ref 12–45)
BILIRUB SERPL-MCNC: 0.4 MG/DL (ref 0.1–1.5)
BUN SERPL-MCNC: 16 MG/DL (ref 8–22)
CALCIUM ALBUM COR SERPL-MCNC: 8.9 MG/DL (ref 8.5–10.5)
CALCIUM SERPL-MCNC: 9.3 MG/DL (ref 8.4–10.2)
CHLORIDE SERPL-SCNC: 103 MMOL/L (ref 96–112)
CHOLEST SERPL-MCNC: 182 MG/DL (ref 100–199)
CO2 SERPL-SCNC: 24 MMOL/L (ref 20–33)
CREAT SERPL-MCNC: 0.99 MG/DL (ref 0.5–1.4)
ERYTHROCYTE [DISTWIDTH] IN BLOOD BY AUTOMATED COUNT: 37.3 FL (ref 35.9–50)
EST. AVERAGE GLUCOSE BLD GHB EST-MCNC: 117 MG/DL
GFR SERPLBLD CREATININE-BSD FMLA CKD-EPI: 92 ML/MIN/1.73 M 2
GLOBULIN SER CALC-MCNC: 3.4 G/DL (ref 1.9–3.5)
GLUCOSE SERPL-MCNC: 102 MG/DL (ref 65–99)
HBA1C MFR BLD: 5.7 % (ref 4–5.6)
HCT VFR BLD AUTO: 43.8 % (ref 42–52)
HDLC SERPL-MCNC: 49 MG/DL
HGB BLD-MCNC: 14.9 G/DL (ref 14–18)
LDLC SERPL CALC-MCNC: 114 MG/DL
MCH RBC QN AUTO: 30 PG (ref 27–33)
MCHC RBC AUTO-ENTMCNC: 34 G/DL (ref 32.3–36.5)
MCV RBC AUTO: 88.1 FL (ref 81.4–97.8)
PLATELET # BLD AUTO: 240 K/UL (ref 164–446)
PMV BLD AUTO: 9.8 FL (ref 9–12.9)
POTASSIUM SERPL-SCNC: 5.2 MMOL/L (ref 3.6–5.5)
PROT SERPL-MCNC: 7.9 G/DL (ref 6–8.2)
PSA SERPL-MCNC: 1.12 NG/ML (ref 0–4)
RBC # BLD AUTO: 4.97 M/UL (ref 4.7–6.1)
SODIUM SERPL-SCNC: 138 MMOL/L (ref 135–145)
TRIGL SERPL-MCNC: 94 MG/DL (ref 0–149)
TSH SERPL DL<=0.005 MIU/L-ACNC: 1.26 UIU/ML (ref 0.38–5.33)
URATE SERPL-MCNC: 7.9 MG/DL (ref 2.5–8.3)
WBC # BLD AUTO: 8.2 K/UL (ref 4.8–10.8)

## 2024-11-15 PROCEDURE — 80074 ACUTE HEPATITIS PANEL: CPT

## 2024-11-15 PROCEDURE — 3074F SYST BP LT 130 MM HG: CPT | Performed by: PHYSICIAN ASSISTANT

## 2024-11-15 PROCEDURE — 87491 CHLMYD TRACH DNA AMP PROBE: CPT

## 2024-11-15 PROCEDURE — 87591 N.GONORRHOEAE DNA AMP PROB: CPT

## 2024-11-15 PROCEDURE — 84443 ASSAY THYROID STIM HORMONE: CPT

## 2024-11-15 PROCEDURE — 87389 HIV-1 AG W/HIV-1&-2 AB AG IA: CPT

## 2024-11-15 PROCEDURE — 3078F DIAST BP <80 MM HG: CPT | Performed by: PHYSICIAN ASSISTANT

## 2024-11-15 PROCEDURE — 80061 LIPID PANEL: CPT

## 2024-11-15 PROCEDURE — 86780 TREPONEMA PALLIDUM: CPT

## 2024-11-15 PROCEDURE — 36415 COLL VENOUS BLD VENIPUNCTURE: CPT

## 2024-11-15 PROCEDURE — 80053 COMPREHEN METABOLIC PANEL: CPT

## 2024-11-15 PROCEDURE — 84550 ASSAY OF BLOOD/URIC ACID: CPT

## 2024-11-15 PROCEDURE — 84153 ASSAY OF PSA TOTAL: CPT

## 2024-11-15 PROCEDURE — 83036 HEMOGLOBIN GLYCOSYLATED A1C: CPT

## 2024-11-15 PROCEDURE — RXMED WILLOW AMBULATORY MEDICATION CHARGE: Performed by: PHYSICIAN ASSISTANT

## 2024-11-15 PROCEDURE — 85027 COMPLETE CBC AUTOMATED: CPT

## 2024-11-15 PROCEDURE — 86696 HERPES SIMPLEX TYPE 2 TEST: CPT

## 2024-11-15 PROCEDURE — 99214 OFFICE O/P EST MOD 30 MIN: CPT | Performed by: PHYSICIAN ASSISTANT

## 2024-11-15 RX ORDER — INDOMETHACIN 50 MG/1
50 CAPSULE ORAL 3 TIMES DAILY
Qty: 90 CAPSULE | Refills: 1 | Status: SHIPPED | OUTPATIENT
Start: 2024-11-15 | End: 2024-12-15

## 2024-11-15 ASSESSMENT — FIBROSIS 4 INDEX: FIB4 SCORE: 0.62

## 2024-11-15 ASSESSMENT — PATIENT HEALTH QUESTIONNAIRE - PHQ9: CLINICAL INTERPRETATION OF PHQ2 SCORE: 0

## 2024-11-15 NOTE — PROGRESS NOTES
"Subjective:     History of Present Illness  The patient is a 48-year-old male who presents today to establish care.    He is seeking a renewal of his indomethacin prescription, which he uses to manage gout flare-ups.  He had a recent flare-up three days ago and started taking indomethacin which has shown improvement with symptoms.. However, the pain has been severe enough to hinder his mobility over the past two days. He reports feeling better today after taking three pills over the last two days. He also takes allopurinol 300 mg daily for gout management.  He tends not to take allopurinol daily.  He has been making dietary changes to help control his gout, such as avoiding beef and spicy foods, which he believes may trigger flare-ups.    He has not yet undergone colon cancer screening, despite having a referral for a colonoscopy in 2023.    He has a history of chickenpox but has never had shingles.    FAMILY HISTORY  His father passed away at the age of 72 from dermatomyositis.      Current medicines (including changes today)  Current Outpatient Medications   Medication Sig Dispense Refill    indomethacin (INDOCIN) 50 MG Cap Take 1 Capsule by mouth 3 times a day for 30 days. 90 Capsule 1    allopurinol (ZYLOPRIM) 300 MG Tab Take 1 Tablet by mouth every day. 90 Tablet 3    Fluocin-Hydroquinone-Tretinoin (TRI-MARICEL) 0.01-4-0.05 % Cream AAA face QHS X 12 weeks, then stop.  Use SPF 50 sunprotection during daytime 30 g 0     No current facility-administered medications for this visit.     He  has a past medical history of Allergic rhinitis due to pollen (9/12/2016) and Measles (1983).    ROS   No chest pain, no shortness of breath, no abdominal pain  Positive ROS as per HPI.  All other systems reviewed and are negative.     Objective:     /62 (BP Location: Right arm, Patient Position: Sitting, BP Cuff Size: Adult)   Pulse 98   Temp 36.4 °C (97.6 °F) (Temporal)   Ht 1.702 m (5' 7\")   Wt 71.6 kg (157 lb 12.8 oz)  "  SpO2 96%  Body mass index is 24.71 kg/m².   Physical Exam    Constitutional: Alert, no distress.  Skin: Warm, dry, good turgor, no rashes in visible areas.  Eye: Equal, round and reactive, conjunctiva clear, lids normal.  ENMT: Lips without lesions, good dentition, oropharynx clear.  Neck: Trachea midline, no masses, no thyromegaly.   Psych: Alert and oriented x3, normal affect and mood.      Results  Laboratory Studies  Uric acid level was 8.7 last year.        Assessment and Plan:   The following treatment plan was discussed    Assessment & Plan  1. Gout.  He experiences gout flares in his foot, with the most recent flare occurring 3 days ago. He is currently taking indomethacin during flares. A prescription for indomethacin 50 mg, to be taken three times a day for 5 days during a gout flare, will be provided. A 30-day supply with a refill will be sent to the pharmacy. He is also on allopurinol 300 mg daily, but has been neglecting to take it regularly. If his uric acid level remains uncontrolled, the dosage of allopurinol will be increased.    2. Health Maintenance.  A comprehensive set of labs will be ordered, including complete blood count, liver and kidney function tests, cholesterol, A1c, prostate blood check, thyroid, and uric acid level. A referral for a colonoscopy will be made. He is advised to receive the Shingrix vaccine and to have ibuprofen on hand for potential side effects.  Ordered STD profile.    Follow-up  Patient will return in 6 months for follow up.      ORDERS:  1. Idiopathic chronic gout of multiple sites without tophus    - URIC ACID; Future  - indomethacin (INDOCIN) 50 MG Cap; Take 1 Capsule by mouth 3 times a day for 30 days.  Dispense: 90 Capsule; Refill: 1    2. Preventative health care    - CBC WITHOUT DIFFERENTIAL; Future  - Comp Metabolic Panel; Future  - Lipid Profile; Future  - HEMOGLOBIN A1C; Future  - TSH WITH REFLEX TO FT4; Future    3. Encounter for screening prostate  specific antigen (PSA) measurement    - PROSTATE SPECIFIC AG SCREENING; Future    4. Screening for colorectal cancer    - Referral to GI for Colonoscopy    5. Screen for STD (sexually transmitted disease)    - HIV AG/AB COMBO ASSAY SCREENING; Future  - HEPATITIS PANEL ACUTE(4 COMPONENTS); Future  - Chlamydia/GC, PCR (Urine); Future  - RPR (SYPHILIS); Future  - HSV II SPECIFIC IGG AB; Future        Please note that this dictation was created using voice recognition software. I have made every reasonable attempt to correct obvious errors, but I expect that there are errors of grammar and possibly content that I did not discover before finalizing the note.      Attestation      Verbal consent was acquired by the patient to use [employer] ambient listening note generation during this visit Yes

## 2024-11-16 LAB
C TRACH DNA SPEC QL NAA+PROBE: NEGATIVE
HSV2 GG IGG SER-ACNC: 0.09 IV
N GONORRHOEA DNA SPEC QL NAA+PROBE: NEGATIVE
SPECIMEN SOURCE: NORMAL

## 2024-11-18 LAB
HAV IGM SERPL QL IA: NONREACTIVE
HBV CORE IGM SER QL: NONREACTIVE
HBV SURFACE AG SER QL: NONREACTIVE
HCV AB SER QL: NONREACTIVE
HIV 1+2 AB+HIV1 P24 AG SERPL QL IA: NORMAL
T PALLIDUM AB SER QL IA: NONREACTIVE

## 2024-11-22 DIAGNOSIS — G89.29 CHRONIC LEFT SHOULDER PAIN: ICD-10-CM

## 2024-11-22 DIAGNOSIS — M54.50 LOW BACK PAIN WITHOUT SCIATICA, UNSPECIFIED BACK PAIN LATERALITY, UNSPECIFIED CHRONICITY: ICD-10-CM

## 2024-11-22 DIAGNOSIS — M25.512 CHRONIC LEFT SHOULDER PAIN: ICD-10-CM

## 2025-03-13 ENCOUNTER — PHARMACY VISIT (OUTPATIENT)
Dept: PHARMACY | Facility: MEDICAL CENTER | Age: 50
End: 2025-03-13
Payer: COMMERCIAL

## 2025-03-13 ENCOUNTER — OFFICE VISIT (OUTPATIENT)
Dept: DERMATOLOGY | Facility: IMAGING CENTER | Age: 50
End: 2025-03-13
Payer: COMMERCIAL

## 2025-03-13 DIAGNOSIS — L30.9 HAND DERMATITIS: ICD-10-CM

## 2025-03-13 DIAGNOSIS — L85.3 XEROSIS OF SKIN: ICD-10-CM

## 2025-03-13 DIAGNOSIS — L82.1 SEBORRHEIC KERATOSIS: ICD-10-CM

## 2025-03-13 PROCEDURE — 99213 OFFICE O/P EST LOW 20 MIN: CPT | Performed by: DERMATOLOGY

## 2025-03-13 PROCEDURE — RXMED WILLOW AMBULATORY MEDICATION CHARGE: Performed by: DERMATOLOGY

## 2025-03-13 RX ORDER — TACROLIMUS 1 MG/G
OINTMENT TOPICAL
Qty: 30 G | Refills: 0 | Status: SHIPPED | OUTPATIENT
Start: 2025-03-13

## 2025-03-13 RX ORDER — BETAMETHASONE DIPROPIONATE 0.5 MG/G
CREAM TOPICAL
Qty: 45 G | Refills: 0 | Status: SHIPPED | OUTPATIENT
Start: 2025-03-13

## 2025-03-13 NOTE — PROGRESS NOTES
CC: hand rash    Subjective: Previously seen patient here for hand rash - itchy. Responds some to moisturizer.     Has had back itching.     History of skin cancer: No  History of biopsies:No  History of blistering/severe sunburns:No  Family history of skin cancer:No  Family history of atypical moles:No    ROS: no fevers/chills. ++ itch.  No cough  Relevant PMH: NC  Social: never smoker    PE: Gen:WDWN male in NAD. Skin: dorsal l>r hand - eczematous/asteatotic plaques. Back - few waxy papules, appearing benign    A/P:   Sks, back:  -b/r    Xerosis, diffuse:  -counseled re: dx/tx  -OTC moisturizers recommended    Hand dermatitis/Eczema;mild-moderate  -Inflammation - betamethasone cream BID-->PRN body, protopic oint BID-->PRN face  -moisturizer use - eucerin, vaseline, aquaphor    F/u PRN

## 2025-04-03 PROCEDURE — RXMED WILLOW AMBULATORY MEDICATION CHARGE: Performed by: DERMATOLOGY

## 2025-04-03 RX ORDER — FLUOCINONIDE 0.5 MG/G
CREAM TOPICAL
Qty: 60 G | Refills: 1 | Status: SHIPPED | OUTPATIENT
Start: 2025-04-03

## 2025-04-09 ENCOUNTER — PHARMACY VISIT (OUTPATIENT)
Dept: PHARMACY | Facility: MEDICAL CENTER | Age: 50
End: 2025-04-09
Payer: COMMERCIAL

## 2025-04-23 PROCEDURE — RXMED WILLOW AMBULATORY MEDICATION CHARGE: Performed by: INTERNAL MEDICINE

## 2025-04-29 ENCOUNTER — PHARMACY VISIT (OUTPATIENT)
Dept: PHARMACY | Facility: MEDICAL CENTER | Age: 50
End: 2025-04-29
Payer: COMMERCIAL

## 2025-04-29 PROCEDURE — RXOTC WILLOW AMBULATORY OTC CHARGE: Performed by: PHARMACIST
